# Patient Record
Sex: MALE | Race: ASIAN | Employment: OTHER | URBAN - METROPOLITAN AREA
[De-identification: names, ages, dates, MRNs, and addresses within clinical notes are randomized per-mention and may not be internally consistent; named-entity substitution may affect disease eponyms.]

---

## 2017-10-20 ENCOUNTER — IMMUNIZATION (OUTPATIENT)
Dept: FAMILY MEDICINE CLINIC | Facility: CLINIC | Age: 72
End: 2017-10-20

## 2017-10-20 ENCOUNTER — MED REC SCAN ONLY (OUTPATIENT)
Dept: FAMILY MEDICINE CLINIC | Facility: CLINIC | Age: 72
End: 2017-10-20

## 2017-10-20 PROCEDURE — 90653 IIV ADJUVANT VACCINE IM: CPT | Performed by: NURSE PRACTITIONER

## 2017-10-20 PROCEDURE — G0008 ADMIN INFLUENZA VIRUS VAC: HCPCS | Performed by: NURSE PRACTITIONER

## 2018-09-18 ENCOUNTER — HOSPITAL ENCOUNTER (OUTPATIENT)
Dept: PHYSICAL THERAPY | Facility: HOSPITAL | Age: 73
Setting detail: THERAPIES SERIES
Discharge: HOME | End: 2018-09-18
Attending: Other
Payer: MEDICARE

## 2018-09-18 DIAGNOSIS — R26.9 ABNORMALITY OF GAIT: ICD-10-CM

## 2018-09-18 PROCEDURE — 97110 THERAPEUTIC EXERCISES: CPT

## 2018-09-18 PROCEDURE — 97161 PT EVAL LOW COMPLEX 20 MIN: CPT

## 2018-09-18 NOTE — PROGRESS NOTES
FALL SCREEN EVALUATION   Referring Physician: Dr. Olive Stoll  Diagnosis: abnormality of gait     Date of Service: 9/18/2018     PATIENT Cade Valentine is a 75year old male who presents to therapy today with complaints of balance difficulties and leg into the fall risk category, although he does have gait abnormalities including poor toe clearance which could put him at increased risk of falling.  He does have quick fatigue of the LE's with closed chain activities and exercises contributing to his rocky Index Score: 11/12 Fall Risk: no  [Scores of 10 or less indicate increased risk for falls]  1. Gait level surface: 2  Grading: Arnulfo the lowest category that applies.   (3)  Normal: Walks 20’, no assistive devices, good speed, no evidence of imbalance, cris wall.    4. Gait with vertical head turns: 3  Grading: Arnulfo the lowest category that applies. (3) Normal: Preforms head turns smoothly with no change in gait.   (2) Mild Impairment: Preforms head turns smoothly with slight change in gait velocity, i.e., mi 20-39% impaired, limited, or restricted  Projected G Code: Changing and Maintaining Body Position CI: 1%-19% impaired, limited, or restricted      Patient/Family/Caregiver was advised of these findings, precautions, and treatment options and has agreed to

## 2018-09-20 ENCOUNTER — HOSPITAL ENCOUNTER (OUTPATIENT)
Dept: PHYSICAL THERAPY | Facility: HOSPITAL | Age: 73
Setting detail: THERAPIES SERIES
Discharge: HOME | End: 2018-09-20
Attending: Other
Payer: MEDICARE

## 2018-09-20 PROCEDURE — 97110 THERAPEUTIC EXERCISES: CPT

## 2018-09-20 NOTE — PROGRESS NOTES
Dx: abnormality of gait            Authorized # of Visits:  10 medicare         Next MD visit: none scheduled  Fall Risk: standard         Precautions: n/a             Subjective: Patient has not noticed any new issues with balance this week.  He has been a

## 2018-09-25 ENCOUNTER — HOSPITAL ENCOUNTER (OUTPATIENT)
Dept: PHYSICAL THERAPY | Facility: HOSPITAL | Age: 73
Setting detail: THERAPIES SERIES
Discharge: HOME | End: 2018-09-25
Attending: Other
Payer: MEDICARE

## 2018-09-25 PROCEDURE — 97110 THERAPEUTIC EXERCISES: CPT

## 2018-09-25 NOTE — PROGRESS NOTES
Dx: abnormality of gait            Authorized # of Visits:  10 medicare         Next MD visit: none scheduled  Fall Risk: standard         Precautions: n/a             Subjective: Patient reports that his legs feel stronger when he is going sit to stand.  L 10        Red TB lateral walk, 3 laps Clams, red TB, 15          Charges: Gladys 3( 45 min)   Total Timed Treatment: 45 min     Total Treatment Time: 45 min

## 2018-09-28 ENCOUNTER — HOSPITAL ENCOUNTER (OUTPATIENT)
Dept: PHYSICAL THERAPY | Facility: HOSPITAL | Age: 73
Setting detail: THERAPIES SERIES
Discharge: HOME | End: 2018-09-28
Attending: INTERNAL MEDICINE
Payer: MEDICARE

## 2018-09-28 PROCEDURE — 97110 THERAPEUTIC EXERCISES: CPT

## 2018-09-28 NOTE — PROGRESS NOTES
Dx: abnormality of gait            Authorized # of Visits:  10 medicare         Next MD visit: none scheduled  Fall Risk: standard         Precautions: n/a             Subjective: Patient did not have soreness after previous session.  His legs are feeling s each       LTR, 15 Forward step up, 6\" step, 12 airex stance ball reach, 3'       Active HS stretch, 15 SB roll up, 10  SB 2 leg march, 10 Red TB lateral walk, 3 laps       Red TB lateral walk, 3 laps Clams, red TB, 15          Charges: Gladys 3( 45 min)   T

## 2018-10-02 ENCOUNTER — HOSPITAL ENCOUNTER (OUTPATIENT)
Dept: PHYSICAL THERAPY | Facility: HOSPITAL | Age: 73
Setting detail: THERAPIES SERIES
Discharge: HOME | End: 2018-10-02
Attending: INTERNAL MEDICINE
Payer: MEDICARE

## 2018-10-02 PROCEDURE — 97110 THERAPEUTIC EXERCISES: CPT

## 2018-10-02 NOTE — PROGRESS NOTES
Dx: abnormality of gait            Authorized # of Visits:  10 medicare         Next MD visit: none scheduled  Fall Risk: standard         Precautions: n/a             Subjective: Patient did not have soreness after previous session.  His legs are feeling s 75#, 3x15 Shuttle DLP, 75#, 3x15 Shuttle DLP, 75#, 3x20 Shuttle DLP, 75#, 3x20      Sit<>stand, 2x15 HS stretch on step, 2x30 sec   Shoulder extension, 10#, 15  Extension on SB, 15 SLS, 5 min      SB hip flexion, 20 Calf stretch, 2x30 sec Tiltboard AP, ML,

## 2018-10-09 ENCOUNTER — HOSPITAL ENCOUNTER (OUTPATIENT)
Dept: OCCUPATIONAL MEDICINE | Facility: HOSPITAL | Age: 73
Setting detail: THERAPIES SERIES
Discharge: HOME | End: 2018-10-09
Attending: Other
Payer: MEDICARE

## 2018-10-09 ENCOUNTER — HOSPITAL ENCOUNTER (OUTPATIENT)
Dept: PHYSICAL THERAPY | Facility: HOSPITAL | Age: 73
Setting detail: THERAPIES SERIES
Discharge: HOME | End: 2018-10-09
Attending: INTERNAL MEDICINE
Payer: MEDICARE

## 2018-10-09 DIAGNOSIS — R25.1 TREMOR OF BOTH HANDS: ICD-10-CM

## 2018-10-09 PROCEDURE — 97165 OT EVAL LOW COMPLEX 30 MIN: CPT

## 2018-10-09 PROCEDURE — 97110 THERAPEUTIC EXERCISES: CPT

## 2018-10-09 NOTE — PROGRESS NOTES
OCCUPATIONAL THERAPY UPPER EXTREMITY EVALUATION   Referring Physician: Dr. Precious Razo  Diagnosis: bilateral hand tremors  (R25.1)   Date of Service: 10/9/2018     PATIENT SUMMARY   Erlin Sosa is a 75year old y/o male who presents to therapy today with com Along bilateral Uln distribution FA    EDEMA:  None bilateral hands    ROM: WNL ZEKE UE     AROM/PROM:(Degrees)  BUE FISHER =WNL    MANUAL MUSCLE TESTING: 3-/5  Of 5/5  Bilateral wrists    Strength (lbs) Right Average Left Average   : 65# 66#   2 pt Pinch: ISA Arceo    [de-identified] certification required: Yes  I certify the need for these services furnished under this plan of treatment and while under my care.     X___________________________________________________ Date____________________    Kathyrn Boxer

## 2018-10-09 NOTE — PROGRESS NOTES
Dx: abnormality of gait            Authorized # of Visits:  10 medicare         Next MD visit: none scheduled  Fall Risk: standard         Precautions: n/a             Subjective: Patient reports that he feels sluggish today and doesn't think that he slept stretch, 3x30 sec     Shuttle DLP, 75#, 3x15 Shuttle DLP, 75#, 3x15 Shuttle DLP, 75#, 3x20 Shuttle DLP, 75#, 3x20 SB roll up, 20     Sit<>stand, 2x15 HS stretch on step, 2x30 sec   Shoulder extension, 10#, 15  Extension on SB, 15 SLS, 5 min Active HS stret

## 2018-10-12 ENCOUNTER — APPOINTMENT (OUTPATIENT)
Dept: PHYSICAL THERAPY | Facility: HOSPITAL | Age: 73
End: 2018-10-12
Attending: INTERNAL MEDICINE
Payer: MEDICARE

## 2018-10-15 ENCOUNTER — HOSPITAL ENCOUNTER (OUTPATIENT)
Dept: OCCUPATIONAL MEDICINE | Facility: HOSPITAL | Age: 73
Setting detail: THERAPIES SERIES
Discharge: HOME | End: 2018-10-15
Attending: INTERNAL MEDICINE
Payer: MEDICARE

## 2018-10-15 PROCEDURE — 97112 NEUROMUSCULAR REEDUCATION: CPT

## 2018-10-15 PROCEDURE — 97140 MANUAL THERAPY 1/> REGIONS: CPT

## 2018-10-15 NOTE — PROGRESS NOTES
Dx:  Tremor of both hands (R25.1)        Authorized # of Visits:  2/12         Next MD visit: none scheduled  Fall Risk: standard         Precautions: n/a             Subjective:   Pt reports:  Most difficulties are with directed , specific tip pinch tasks

## 2018-10-22 ENCOUNTER — HOSPITAL ENCOUNTER (OUTPATIENT)
Dept: PHYSICAL THERAPY | Facility: HOSPITAL | Age: 73
Setting detail: THERAPIES SERIES
Discharge: HOME | End: 2018-10-22
Attending: INTERNAL MEDICINE
Payer: MEDICARE

## 2018-10-22 PROCEDURE — 97110 THERAPEUTIC EXERCISES: CPT

## 2018-10-23 ENCOUNTER — HOSPITAL ENCOUNTER (OUTPATIENT)
Dept: OCCUPATIONAL MEDICINE | Facility: HOSPITAL | Age: 73
Setting detail: THERAPIES SERIES
Discharge: HOME | End: 2018-10-23
Attending: Other
Payer: MEDICARE

## 2018-10-23 PROCEDURE — 97110 THERAPEUTIC EXERCISES: CPT

## 2018-10-23 NOTE — PROGRESS NOTES
Dx:  Tremor of both hands (R25.1)        Authorized # of Visits:  2/12         Next MD visit: none scheduled  Fall Risk: standard         Precautions: n/a             Subjective:   Pt reports:  * has a hard time positioning finger for DB blood test strip. Skilled Services:manual therapy    Charges: 2xTE, 1x MT       Total Timed Treatment: 45 min  Total Treatment Time: 50 min

## 2018-10-23 NOTE — PROGRESS NOTES
FALL SCREEN PROGRESS:   Referring Physician: Dr. Rhea Boucher  Diagnosis: abnormality of gait     Date of Service: 10/22/2018     PATIENT Kenneth Curry is a 75year old male who has attended 7 session of PT for chief complaint of balance difficulties a Wan 2006. ..   61-75 y/o mean 8.1s (7.1-9.0s)   66-78 y/o mean 9.2s (8.2-10.2s)   80-81 y/o mean 11.3s (10.0-12.7s)]    4 Item Dynamic Gait Index Score: 11/12 Fall Risk: no  [Scores of 10 or less indicate increased risk for falls]  1.  Gait level surfa Severe Impairment: Preform task with severe disruption of gait, i.e., staggers outside 15” path, loses balance, stops, reaches for wall. 4. Gait with vertical head turns: 3  Grading: Arnulfo the lowest category that applies.   (3) Normal: Preforms head tur Re-education; Gait Training; Therapeutic Exercises; Manual Therapy;  Patient education; Home exercise program instruction    Education or treatment limitation: None  Rehab Potential:good    FOTO: 61/100    Current G Code: Changing and Maintaining Body Posit

## 2018-10-30 ENCOUNTER — HOSPITAL ENCOUNTER (OUTPATIENT)
Dept: OCCUPATIONAL MEDICINE | Facility: HOSPITAL | Age: 73
Setting detail: THERAPIES SERIES
Discharge: HOME | End: 2018-10-30
Attending: INTERNAL MEDICINE
Payer: MEDICARE

## 2018-10-30 PROCEDURE — 97110 THERAPEUTIC EXERCISES: CPT

## 2018-10-30 PROCEDURE — 97112 NEUROMUSCULAR REEDUCATION: CPT

## 2018-10-30 NOTE — PROGRESS NOTES
Dx:  Tremor of both hands (R25.1)        Authorized # of Visits:  4/12         Next MD visit: none scheduled  Fall Risk: standard         Precautions: n/a             Subjective:   Pt reports:  * \"pins/needles\" seem to be increasing weekly in bilateral S and carpals yel putty tip pinch,pull and  bilat       yel putty  and pinch bilat hands Nut/bolt board on/off tip  Pinch bilat yel putty grab,squeeze twist       9-hole peg test in and tweezer out  bilat Thin silver peg in/out tip all bilateral Red

## 2018-11-06 ENCOUNTER — APPOINTMENT (OUTPATIENT)
Dept: OCCUPATIONAL MEDICINE | Facility: HOSPITAL | Age: 73
End: 2018-11-06
Payer: MEDICARE

## 2018-11-07 ENCOUNTER — APPOINTMENT (OUTPATIENT)
Dept: OCCUPATIONAL MEDICINE | Facility: HOSPITAL | Age: 73
End: 2018-11-07
Attending: INTERNAL MEDICINE
Payer: MEDICARE

## 2018-11-09 ENCOUNTER — APPOINTMENT (OUTPATIENT)
Dept: OCCUPATIONAL MEDICINE | Facility: HOSPITAL | Age: 73
End: 2018-11-09
Attending: INTERNAL MEDICINE
Payer: MEDICARE

## 2018-11-12 ENCOUNTER — HOSPITAL ENCOUNTER (OUTPATIENT)
Dept: PHYSICAL THERAPY | Facility: HOSPITAL | Age: 73
Setting detail: THERAPIES SERIES
Discharge: HOME | End: 2018-11-12
Attending: INTERNAL MEDICINE
Payer: MEDICARE

## 2018-11-12 PROCEDURE — 97110 THERAPEUTIC EXERCISES: CPT

## 2018-11-12 NOTE — PROGRESS NOTES
Dx: abnormality of gait            Authorized # of Visits:  10 medicare         Next MD visit: none scheduled  Fall Risk: standard         Precautions: n/a             Subjective: Patient reports that his back has been bothering him but that he has been ab 3x30 sec Supine quad stretch, 3x30 sec    airex step up and over, 10 airex step up and over, 10 Cone taps, 2x5 Scott/airex walk over, 4 laps Supine HS stretch, 3x30 sec Supine HS stretch, 3x30 sec    Standing march, 2x1' Standing march, 2x1' March w/ opp

## 2018-11-13 ENCOUNTER — HOSPITAL ENCOUNTER (OUTPATIENT)
Dept: OCCUPATIONAL MEDICINE | Facility: HOSPITAL | Age: 73
Setting detail: THERAPIES SERIES
Discharge: HOME | End: 2018-11-13
Attending: Other
Payer: MEDICARE

## 2018-11-13 PROCEDURE — 97110 THERAPEUTIC EXERCISES: CPT

## 2018-11-13 NOTE — PROGRESS NOTES
Dx:  Tremor of both hands (R25.1)        Authorized # of Visits:  5/12         Next MD visit:    Fall Risk: standard         Precautions: n/a             Subjective:   Pt reports:  * \"pins/needles\" continues to be increasing weekly in bilateral SF , ulna and  bilat yel puttycize bilat 2 directions      yel putty  and pinch bilat hands Nut/bolt board on/off tip  Pinch bilat yel putty grab,squeeze twist Red twist stik 3 directions        9-hole peg test in and tweezer out  bilat Thin silver peg in/ou

## 2018-11-20 ENCOUNTER — APPOINTMENT (OUTPATIENT)
Dept: OCCUPATIONAL MEDICINE | Facility: HOSPITAL | Age: 73
End: 2018-11-20
Payer: MEDICARE

## 2018-11-27 ENCOUNTER — ORDER TRANSCRIPTION (OUTPATIENT)
Dept: PHYSICAL THERAPY | Facility: HOSPITAL | Age: 73
End: 2018-11-27

## 2018-11-27 ENCOUNTER — HOSPITAL ENCOUNTER (OUTPATIENT)
Dept: OCCUPATIONAL MEDICINE | Facility: HOSPITAL | Age: 73
Setting detail: THERAPIES SERIES
Discharge: HOME | End: 2018-11-27
Attending: INTERNAL MEDICINE
Payer: MEDICARE

## 2018-11-27 DIAGNOSIS — M54.12 CERVICAL RADICULITIS: Primary | ICD-10-CM

## 2018-11-27 PROCEDURE — 97112 NEUROMUSCULAR REEDUCATION: CPT

## 2018-11-27 PROCEDURE — 97110 THERAPEUTIC EXERCISES: CPT

## 2018-11-27 NOTE — PROGRESS NOTES
Dx:  Tremor of both hands (R25.1)        Authorized # of Visits:  6/12         Next MD visit:    Fall Risk: standard         Precautions: n/a           Discharge Summary    Pt has attended 6, cancelled 1, and no shown 0 visits in Occupational Therapy. pt Pinch: 15# 14#  (-1)   Lateral Pinch: 12#  (-2) 12#  (-1)      NECK SCREEN:  WNL Tho resting neck position: head listing to R. Pt is aware of this position.  Can correct to normal midline if asked.     9- Hole Peg Test: R=26.62 sec  (+2 sec) and rad dev. jnt mobs bilat fingers and carpals yel putty tip pinch,pull and  bilat yel puttycize bilat 2 directions yel puttycize bilat 2 directions     yel putty  and pinch bilat hands Nut/bolt board on/off tip  Pinch bilat yel putty grab,squeeze

## 2018-12-04 ENCOUNTER — HOSPITAL ENCOUNTER (OUTPATIENT)
Dept: PHYSICAL THERAPY | Facility: HOSPITAL | Age: 73
Setting detail: THERAPIES SERIES
Discharge: HOME | End: 2018-12-04
Attending: INTERNAL MEDICINE
Payer: MEDICARE

## 2018-12-04 DIAGNOSIS — M54.12 CERVICAL RADICULITIS: ICD-10-CM

## 2018-12-04 PROCEDURE — 97110 THERAPEUTIC EXERCISES: CPT | Performed by: PHYSICAL THERAPIST

## 2018-12-04 PROCEDURE — 97162 PT EVAL MOD COMPLEX 30 MIN: CPT | Performed by: PHYSICAL THERAPIST

## 2018-12-04 NOTE — PROGRESS NOTES
SPINE EVALUATION:   Referring Physician: Dr. Star De La Torre  Diagnosis: Cervical radiculitis (M54.12)     Date of Service: 12/4/2018     PATIENT SUMMARY   Gregg Delgado is a 75year old y/o male who presents to therapy today with complaints of history of back, n mechanical and structural components, He also has significantly tightness in his hamstring.s  Physical Exam findings that reproduce the patient's chief complaint include forward bending, transition form sit-stand.    Physical Exam findings that improve the dural flossing.     Patient was instructed in and issued a HEP for general stretch, flexibility   Charges: PT Eval Moderate Complexity, TE1      Total Timed Treatment: 45 min     Total Treatment Time: 45 min     PLAN OF CARE:    Goals:    · Pt will improve this course of care. Thank you for your referral. Please co-sign or sign and return this letter via fax as soon as possible to [phone].  If you have any questions, please contact me at Dept: [phone]    Sincerely,  Electronically signed by thera

## 2018-12-07 ENCOUNTER — HOSPITAL ENCOUNTER (OUTPATIENT)
Dept: PHYSICAL THERAPY | Facility: HOSPITAL | Age: 73
Setting detail: THERAPIES SERIES
Discharge: HOME | End: 2018-12-07
Attending: INTERNAL MEDICINE
Payer: MEDICARE

## 2018-12-07 NOTE — PROGRESS NOTES
Dx: Cervical radiculitis (M54.12)   Authorized # of Visits:  8         Next MD visit: none scheduled  Fall Risk: standard         Precautions: n/a             Subjective: No change in my pain. Started my exercise. Objective: Seen for first treatment.

## 2018-12-10 ENCOUNTER — HOSPITAL ENCOUNTER (OUTPATIENT)
Dept: PHYSICAL THERAPY | Facility: HOSPITAL | Age: 73
Setting detail: THERAPIES SERIES
Discharge: HOME | End: 2018-12-10
Attending: INTERNAL MEDICINE
Payer: MEDICARE

## 2018-12-10 PROCEDURE — 97110 THERAPEUTIC EXERCISES: CPT | Performed by: PHYSICAL THERAPIST

## 2018-12-10 PROCEDURE — 97140 MANUAL THERAPY 1/> REGIONS: CPT | Performed by: PHYSICAL THERAPIST

## 2018-12-13 ENCOUNTER — HOSPITAL ENCOUNTER (OUTPATIENT)
Dept: PHYSICAL THERAPY | Facility: HOSPITAL | Age: 73
Setting detail: THERAPIES SERIES
Discharge: HOME | End: 2018-12-13
Attending: INTERNAL MEDICINE
Payer: MEDICARE

## 2018-12-13 PROCEDURE — 97110 THERAPEUTIC EXERCISES: CPT | Performed by: PHYSICAL THERAPIST

## 2018-12-13 PROCEDURE — 97140 MANUAL THERAPY 1/> REGIONS: CPT | Performed by: PHYSICAL THERAPIST

## 2018-12-14 NOTE — PROGRESS NOTES
Dx: Cervical radiculitis (M54.12)   Authorized # of Visits:  8         Next MD visit: none scheduled  Fall Risk: standard         Precautions: n/a             Subjective: Pain maybe a little bit better. Still have a hard time with getting dressed .   Legs a

## 2018-12-17 ENCOUNTER — APPOINTMENT (OUTPATIENT)
Dept: PHYSICAL THERAPY | Facility: HOSPITAL | Age: 73
End: 2018-12-17
Attending: INTERNAL MEDICINE
Payer: MEDICARE

## 2018-12-18 ENCOUNTER — HOSPITAL ENCOUNTER (OUTPATIENT)
Dept: PHYSICAL THERAPY | Facility: HOSPITAL | Age: 73
Setting detail: THERAPIES SERIES
Discharge: HOME | End: 2018-12-18
Attending: INTERNAL MEDICINE
Payer: MEDICARE

## 2018-12-18 PROCEDURE — 97110 THERAPEUTIC EXERCISES: CPT | Performed by: PHYSICAL THERAPIST

## 2018-12-18 PROCEDURE — 97140 MANUAL THERAPY 1/> REGIONS: CPT | Performed by: PHYSICAL THERAPIST

## 2018-12-18 NOTE — PROGRESS NOTES
Dx: Cervical radiculitis (M54.12)   Authorized # of Visits:  8         Next MD visit: none scheduled  Fall Risk: standard         Precautions: n/a             Subjective:Still tight, tender when I have to stand for extended periods.   Tried to go shopping t

## 2018-12-20 ENCOUNTER — HOSPITAL ENCOUNTER (OUTPATIENT)
Dept: PHYSICAL THERAPY | Facility: HOSPITAL | Age: 73
Setting detail: THERAPIES SERIES
Discharge: HOME | End: 2018-12-20
Attending: INTERNAL MEDICINE
Payer: MEDICARE

## 2018-12-20 PROCEDURE — 97110 THERAPEUTIC EXERCISES: CPT | Performed by: PHYSICAL THERAPIST

## 2018-12-20 PROCEDURE — 97140 MANUAL THERAPY 1/> REGIONS: CPT | Performed by: PHYSICAL THERAPIST

## 2018-12-20 NOTE — PROGRESS NOTES
Dx: Cervical radiculitis (M54.12)   Authorized # of Visits:  8         Next MD visit: none scheduled  Fall Risk: standard         Precautions: n/a             Subjective: Pain is better when I am standing but it still hurts when I stand longer than 10' (us spine in sidelying .   Green knobby ball stm to lumbar spine in sidelying  Balance board x2' each direction       DKTC x20   LBR x20  Standing LBR with red TB x10 with soft knees                Skilled Services: TNE    Charges: 2 te, 1 manual        Total T

## 2018-12-24 ENCOUNTER — HOSPITAL ENCOUNTER (OUTPATIENT)
Dept: PHYSICAL THERAPY | Facility: HOSPITAL | Age: 73
Setting detail: THERAPIES SERIES
Discharge: HOME | End: 2018-12-24
Attending: INTERNAL MEDICINE
Payer: MEDICARE

## 2018-12-24 PROCEDURE — 97140 MANUAL THERAPY 1/> REGIONS: CPT | Performed by: PHYSICAL THERAPIST

## 2018-12-24 PROCEDURE — 97110 THERAPEUTIC EXERCISES: CPT | Performed by: PHYSICAL THERAPIST

## 2018-12-24 NOTE — PROGRESS NOTES
Dx: Cervical radiculitis (M54.12)   Authorized # of Visits:  8         Next MD visit: none scheduled  Fall Risk: standard         Precautions: n/a             Subjective: Still have pain when I am standing in extended periods .       Objective: Seen for alberto rotation with red tb x10 each seated  Green knobby ball stm to lumbar spine in sidelying .   Green knobby ball stm to lumbar spine in sidelying  Balance board x2' each direction  Standing LBR with red TB x10 with soft knees     DKTC x20   LBR x20  Standing

## 2018-12-31 ENCOUNTER — HOSPITAL ENCOUNTER (OUTPATIENT)
Dept: PHYSICAL THERAPY | Facility: HOSPITAL | Age: 73
Setting detail: THERAPIES SERIES
Discharge: HOME | End: 2018-12-31
Attending: INTERNAL MEDICINE
Payer: MEDICARE

## 2018-12-31 PROCEDURE — 97140 MANUAL THERAPY 1/> REGIONS: CPT | Performed by: PHYSICAL THERAPIST

## 2018-12-31 PROCEDURE — 97110 THERAPEUTIC EXERCISES: CPT | Performed by: PHYSICAL THERAPIST

## 2018-12-31 NOTE — PROGRESS NOTES
Dx: Cervical radiculitis (M54.12)   Authorized # of Visits:  8         Next MD visit: none scheduled  Fall Risk: standard         Precautions: n/a             Subjective: About 60% better overall.  Still notes pain and fatigue when I am standing and shaving SB   DKTC  LBR  Opp arm/leg  piriformis stretch supine x20 SB   DKTC  LBR  Opp arm/leg  piriformis stretch supine x20 SB   DKTC  LBR  Opp arm/leg  piriformis stretch supine x20   Rhythmic stab in hooklying  Seated pelvic tilts Pelvic tilts/rocks in sittin

## 2019-01-04 ENCOUNTER — HOSPITAL ENCOUNTER (OUTPATIENT)
Dept: PHYSICAL THERAPY | Facility: HOSPITAL | Age: 74
Setting detail: THERAPIES SERIES
Discharge: HOME | End: 2019-01-04
Attending: INTERNAL MEDICINE
Payer: MEDICARE

## 2019-01-04 PROCEDURE — 97140 MANUAL THERAPY 1/> REGIONS: CPT | Performed by: PHYSICAL THERAPIST

## 2019-01-04 PROCEDURE — 97110 THERAPEUTIC EXERCISES: CPT | Performed by: PHYSICAL THERAPIST

## 2019-01-04 NOTE — PROGRESS NOTES
Dx: Cervical radiculitis (M54.12)   Authorized # of Visits:  8         Next MD visit: none scheduled  Fall Risk: standard         Precautions: n/a             Subjective: Have a cold, so everything aches. Back is sore and tired today.  Haven't been doing t x20 SB   DKTC  LBR  Opp arm/leg  piriformis stretch supine x20 SB   DKTC  LBR  Opp arm/leg  piriformis stretch supine x20   Rhythmic stab in hooklying  Seated pelvic tilts Pelvic tilts/rocks in sitting x10  Pelvic tilts/rocks in sitting x10 Standing LBR wi

## 2019-01-07 ENCOUNTER — HOSPITAL ENCOUNTER (OUTPATIENT)
Dept: PHYSICAL THERAPY | Facility: HOSPITAL | Age: 74
Setting detail: THERAPIES SERIES
Discharge: HOME | End: 2019-01-07
Attending: INTERNAL MEDICINE
Payer: MEDICARE

## 2019-01-07 PROCEDURE — 97110 THERAPEUTIC EXERCISES: CPT | Performed by: PHYSICAL THERAPIST

## 2019-01-07 PROCEDURE — 97140 MANUAL THERAPY 1/> REGIONS: CPT | Performed by: PHYSICAL THERAPIST

## 2019-01-07 NOTE — PROGRESS NOTES
Dx: Cervical radiculitis (M54.12)   Authorized # of Visits:  8         Next MD visit: none scheduled  Fall Risk: standard         Precautions: n/a             Subjective: Standing still bothers me babak when I have to shave first thing in the morning .   Have with band x20 each  Red TB Bent knee fall outs   Bridge with band x20 each  gnr  TB   Hs stretch /lad for hips/knee    Hs stretch /lad for hips/knee  SB   DKTC  LBR  Opp arm/leg  piriformis stretch supine x20  SB   DKTC  LBR  Opp arm/leg  piriformis stretc

## 2019-01-10 ENCOUNTER — APPOINTMENT (OUTPATIENT)
Dept: PHYSICAL THERAPY | Facility: HOSPITAL | Age: 74
End: 2019-01-10
Payer: MEDICARE

## 2019-01-11 ENCOUNTER — OFFICE VISIT (OUTPATIENT)
Dept: FAMILY MEDICINE CLINIC | Facility: CLINIC | Age: 74
End: 2019-01-11
Payer: COMMERCIAL

## 2019-01-11 VITALS
TEMPERATURE: 99 F | HEIGHT: 66 IN | RESPIRATION RATE: 14 BRPM | DIASTOLIC BLOOD PRESSURE: 60 MMHG | SYSTOLIC BLOOD PRESSURE: 140 MMHG | BODY MASS INDEX: 33.59 KG/M2 | OXYGEN SATURATION: 99 % | HEART RATE: 58 BPM | WEIGHT: 209 LBS

## 2019-01-11 DIAGNOSIS — R05.8 PRODUCTIVE COUGH: Primary | ICD-10-CM

## 2019-01-11 PROCEDURE — 99203 OFFICE O/P NEW LOW 30 MIN: CPT | Performed by: FAMILY MEDICINE

## 2019-01-11 RX ORDER — OLMESARTAN MEDOXOMIL 40 MG/1
40 TABLET ORAL DAILY
COMMUNITY

## 2019-01-11 RX ORDER — PREGABALIN 75 MG/1
75 CAPSULE ORAL
COMMUNITY
End: 2020-01-07

## 2019-01-11 RX ORDER — LEVOTHYROXINE SODIUM 75 UG/1
CAPSULE ORAL
COMMUNITY
End: 2019-10-11

## 2019-01-11 RX ORDER — POTASSIUM CHLORIDE 750 MG/1
10 TABLET, EXTENDED RELEASE ORAL
COMMUNITY
End: 2020-03-30

## 2019-01-11 RX ORDER — TAMSULOSIN HYDROCHLORIDE 0.4 MG/1
0.8 CAPSULE ORAL
COMMUNITY
End: 2019-10-03

## 2019-01-11 RX ORDER — AMOXICILLIN AND CLAVULANATE POTASSIUM 500; 125 MG/1; MG/1
1 TABLET, FILM COATED ORAL 2 TIMES DAILY
Qty: 14 TABLET | Refills: 0 | Status: SHIPPED | OUTPATIENT
Start: 2019-01-11 | End: 2019-07-22 | Stop reason: ALTCHOICE

## 2019-01-12 NOTE — PROGRESS NOTES
HPI:   Jovan Ferrara is a 75year old male that presents for Patient presents with:  Establish Care: new to doctor  Cough: approx. 3-4 days; pt denies fever    Patient denies any fever or chills. Slightly productive cough.   Patient was previously seeing  MetFORMIN HCl (GLUCOPHAGE) 500 MG Oral Tab, , Disp: , Rfl:   •  Niacin ER, Antihyperlipidemic, (NIASPAN) 500 MG Oral Tab CR, , Disp: , Rfl:   •  simvastatin (ZOCOR) 10 MG Oral Tab, , Disp: , Rfl:   •  ACCU-CHEK COMPACT PLUS In Vitro Strip, , Disp: , Rfl:

## 2019-01-14 ENCOUNTER — HOSPITAL ENCOUNTER (OUTPATIENT)
Dept: PHYSICAL THERAPY | Facility: HOSPITAL | Age: 74
Setting detail: THERAPIES SERIES
Discharge: HOME | End: 2019-01-14
Attending: FAMILY MEDICINE
Payer: MEDICARE

## 2019-01-14 PROCEDURE — 97140 MANUAL THERAPY 1/> REGIONS: CPT | Performed by: PHYSICAL THERAPIST

## 2019-01-14 PROCEDURE — 97110 THERAPEUTIC EXERCISES: CPT | Performed by: PHYSICAL THERAPIST

## 2019-01-14 NOTE — PROGRESS NOTES
Dx: Cervical radiculitis (M54.12)   Authorized # of Visits:  8         Next MD visit: none scheduled  Fall Risk: standard         Precautions: n/a             Subjective: better today. Overall I can tell that most days it doesn't hurt like it used to .   I Bridge with band x20 each  gnr  TB Bent knee fall outs   Bridge with band x20 each  gnr  TB   Hs stretch /lad for hips/knee    Hs stretch /lad for hips/knee  SB   DKTC  LBR  Opp arm/leg  piriformis stretch supine x20  SB   DKTC  LBR  Opp arm/leg  pirifor Treatment Time: 45 min

## 2019-01-17 ENCOUNTER — HOSPITAL ENCOUNTER (OUTPATIENT)
Dept: PHYSICAL THERAPY | Facility: HOSPITAL | Age: 74
Setting detail: THERAPIES SERIES
Discharge: HOME | End: 2019-01-17
Attending: INTERNAL MEDICINE
Payer: MEDICARE

## 2019-01-17 PROCEDURE — 97140 MANUAL THERAPY 1/> REGIONS: CPT | Performed by: PHYSICAL THERAPIST

## 2019-01-17 PROCEDURE — 97110 THERAPEUTIC EXERCISES: CPT | Performed by: PHYSICAL THERAPIST

## 2019-01-18 NOTE — PROGRESS NOTES
Dx: Cervical radiculitis (M54.12)   Authorized # of Visits:  8         Next MD visit: none scheduled  Fall Risk: standard         Precautions: n/a             Subjective: Stiff today, so my upper back and middle back feel very tight.   Standing was hard tod Bent knee fall outs   Bridge with band x20 each  Red TB  Bent knee fall outs   Bridge with band x20 each  Red TB  Bent knee fall outs   Bridge with band x20 each  Red TB Bent knee fall outs   Bridge with band x20 each  gnr  TB Bent knee fall outs   Bridge red TB x10 with soft knees Standing LBR with red TB x10 Standing LBR with red TB x10 sidelying with lumbar flexion green knobby roller x5' to lumbar paraspinals   sidelying with lumbar flexion green knobby roller x5' to lumbar paraspinals     DKTC x20   LB

## 2019-03-04 ENCOUNTER — PRIOR ORIGINAL RECORDS (OUTPATIENT)
Dept: OTHER | Age: 74
End: 2019-03-04

## 2019-03-07 VITALS
WEIGHT: 209 LBS | SYSTOLIC BLOOD PRESSURE: 132 MMHG | HEART RATE: 64 BPM | DIASTOLIC BLOOD PRESSURE: 68 MMHG | HEIGHT: 66 IN | BODY MASS INDEX: 33.59 KG/M2

## 2019-03-08 DIAGNOSIS — Z95.1 HX OF CABG: Primary | ICD-10-CM

## 2019-03-08 DIAGNOSIS — Z95.1 HX OF CABG: ICD-10-CM

## 2019-03-08 DIAGNOSIS — I25.10 CARDIOVASCULAR ARTERIOSCLEROSIS: Primary | ICD-10-CM

## 2019-03-08 DIAGNOSIS — I25.10 CORONARY ARTERIOSCLEROSIS: ICD-10-CM

## 2019-03-19 ENCOUNTER — HOSPITAL ENCOUNTER (OUTPATIENT)
Dept: CV DIAGNOSTICS | Facility: HOSPITAL | Age: 74
Discharge: HOME | End: 2019-03-19
Attending: INTERNAL MEDICINE

## 2019-03-19 DIAGNOSIS — Z95.1 HX OF CABG: ICD-10-CM

## 2019-03-19 DIAGNOSIS — I25.10 CORONARY ARTERIOSCLEROSIS: ICD-10-CM

## 2019-03-19 PROCEDURE — 93306 TTE W/DOPPLER COMPLETE: CPT | Performed by: INTERNAL MEDICINE

## 2019-03-22 ENCOUNTER — TELEPHONE (OUTPATIENT)
Dept: CARDIOLOGY | Age: 74
End: 2019-03-22

## 2019-04-01 ENCOUNTER — MED REC SCAN ONLY (OUTPATIENT)
Dept: FAMILY MEDICINE CLINIC | Facility: CLINIC | Age: 74
End: 2019-04-01

## 2019-04-05 ENCOUNTER — OFFICE VISIT (OUTPATIENT)
Dept: FAMILY MEDICINE CLINIC | Facility: CLINIC | Age: 74
End: 2019-04-05
Payer: COMMERCIAL

## 2019-04-05 VITALS
DIASTOLIC BLOOD PRESSURE: 60 MMHG | WEIGHT: 208 LBS | SYSTOLIC BLOOD PRESSURE: 124 MMHG | RESPIRATION RATE: 12 BRPM | HEART RATE: 53 BPM | TEMPERATURE: 98 F | BODY MASS INDEX: 33.43 KG/M2 | OXYGEN SATURATION: 100 % | HEIGHT: 66 IN

## 2019-04-05 DIAGNOSIS — B02.9 HERPES ZOSTER WITHOUT COMPLICATION: Primary | ICD-10-CM

## 2019-04-05 DIAGNOSIS — M25.511 ACUTE PAIN OF RIGHT SHOULDER: ICD-10-CM

## 2019-04-05 DIAGNOSIS — R05.9 COUGH: ICD-10-CM

## 2019-04-05 PROCEDURE — 99214 OFFICE O/P EST MOD 30 MIN: CPT | Performed by: FAMILY MEDICINE

## 2019-04-05 RX ORDER — CEPHALEXIN 500 MG/1
500 CAPSULE ORAL 3 TIMES DAILY
COMMUNITY
End: 2019-07-22 | Stop reason: ALTCHOICE

## 2019-04-05 RX ORDER — VALACYCLOVIR HYDROCHLORIDE 1 G/1
TABLET, FILM COATED ORAL 3 TIMES DAILY
COMMUNITY
End: 2019-07-22 | Stop reason: ALTCHOICE

## 2019-04-05 NOTE — PROGRESS NOTES
HPI:   Kristen Burleson is a 75year old male that presents for shingles    Patient is here for a follow up on shingles. He was in [facility] and got prescribed valacyclovir and keflex.  He is currently taking meds    Shoulder pain- right side; limited ROM for the last ACCU-CHEK COMPACT PLUS In Vitro Strip, , Disp: , Rfl:   •  LANTUS SOLOSTAR 100 UNIT/ML Subcutaneous Solution Pen-injector, , Disp: , Rfl:   •  APIDRA SOLOSTAR 100 UNIT/ML Subcutaneous Solution Pen-injector, , Disp: , Rfl:   •  Isosorbide Mononitrate ER (IM shoulder    Cough likely due to upper respiratory infection. Symptomatic treatment with decongestant and OTC cold medicine if needed. If worsens seek medical attention.     Regarding shingles rash on the posterior right arm continue and finish course of V

## 2019-04-07 LAB
ALT SERPL-CCNC: 22 U/L (ref 9–46)
AST SERPL-CCNC: 19 U/L (ref 10–35)
CHOLEST SERPL-MCNC: 144 MG/DL
CHOLEST/HDLC SERPL: 4.1 (CALC)
HDLC SERPL-MCNC: 35 MG/DL
LDLC SERPL CALC-MCNC: 82 MG/DL (CALC)
NONHDLC SERPL-MCNC: 109 MG/DL (CALC)
TRIGL SERPL-MCNC: 173 MG/DL

## 2019-04-08 ENCOUNTER — TELEPHONE (OUTPATIENT)
Dept: FAMILY MEDICINE CLINIC | Facility: CLINIC | Age: 74
End: 2019-04-08

## 2019-04-08 NOTE — TELEPHONE ENCOUNTER
Please advise on alternatives    LOV: 4/5/19  Est Care 01/11/19   Medication Quantity Refills Start End   Olmesartan Medoxomil 40 MG Oral Tab       Sig:   Take 40 mg by mouth.

## 2019-04-08 NOTE — TELEPHONE ENCOUNTER
Pt calling to let Dr. Jose Alberto Fletcher know that he is unable to get a refill on the Olmesartan. Pharmacies are out of the medication-it is on backorder. Patient will need alternate medication sent to local [address] [place] [address].   Dr. Jose Alberto Fletcher has not prescribed this [facility]

## 2019-04-09 ENCOUNTER — TELEPHONE (OUTPATIENT)
Dept: CARDIOLOGY | Age: 74
End: 2019-04-09

## 2019-04-09 RX ORDER — IRBESARTAN 150 MG/1
150 TABLET ORAL DAILY
Qty: 90 TABLET | Refills: 0 | Status: SHIPPED | OUTPATIENT
Start: 2019-04-09 | End: 2019-07-21

## 2019-04-09 NOTE — TELEPHONE ENCOUNTER
Can take irbesartan 150mg daily, sent to pharmacy. Patient should have blood pressure check in 1 week. If still high we can always increase to 300 mg if we need to. Usual dosages either 150 or 300 mg.     Please note cannot equate milligrams of one medic

## 2019-04-09 NOTE — TELEPHONE ENCOUNTER
Patient informed of MD recommendations, patient verbalized understanding with intent to comply. Offered opportunity to ask questions, all questions were answered.     Future Appointments   Date Time Provider Agatha Hogan   4/16/2019  2:40 PM Nancy Briggs

## 2019-04-16 ENCOUNTER — OFFICE VISIT (OUTPATIENT)
Dept: FAMILY MEDICINE CLINIC | Facility: CLINIC | Age: 74
End: 2019-04-16
Payer: COMMERCIAL

## 2019-04-16 VITALS
OXYGEN SATURATION: 99 % | BODY MASS INDEX: 33.91 KG/M2 | WEIGHT: 211 LBS | TEMPERATURE: 98 F | SYSTOLIC BLOOD PRESSURE: 118 MMHG | HEIGHT: 66 IN | RESPIRATION RATE: 16 BRPM | HEART RATE: 50 BPM | DIASTOLIC BLOOD PRESSURE: 60 MMHG

## 2019-04-16 DIAGNOSIS — B02.9 HERPES ZOSTER WITHOUT COMPLICATION: ICD-10-CM

## 2019-04-16 DIAGNOSIS — I10 ESSENTIAL HYPERTENSION: ICD-10-CM

## 2019-04-16 DIAGNOSIS — M54.5 BILATERAL LOW BACK PAIN, UNSPECIFIED CHRONICITY, WITH SCIATICA PRESENCE UNSPECIFIED: Primary | ICD-10-CM

## 2019-04-16 PROCEDURE — 99214 OFFICE O/P EST MOD 30 MIN: CPT | Performed by: FAMILY MEDICINE

## 2019-04-16 NOTE — PROGRESS NOTES
HPI:   Mirza Young is a 75year old male that presents for Patient presents with:  Medication Follow-Up: Irbesartan- no complaints or concerns, he states medication seems to be working fine- he hasn't checked at home.     Patient states that having some ch Tab, , Disp: , Rfl:   •  Vitamin D, Ergocalciferol, (DRISDOL) 30165 UNITS Oral Cap, , Disp: , Rfl:   •  Ferrous Sulfate 325 (65 FE) MG Oral Tab, , Disp: , Rfl:   •  ACCU-CHEK COMPACT PLUS In Vitro Strip, , Disp: , Rfl:   •  LANTUS SOLOSTAR 100 UNIT/ML Subc cyanosis, 2+ radial pulses b/l. NEURO:  Grossly normal     ASSESSMENT AND PLAN:      1. Bilateral low back pain, unspecified chronicity, with sciatica presence unspecified  - PHYSICAL THERAPY EXTERNAL    2. Essential hypertension    3.  Herpes zoster with

## 2019-04-17 PROBLEM — I10 ESSENTIAL HYPERTENSION: Status: ACTIVE | Noted: 2019-04-17

## 2019-04-24 ENCOUNTER — TELEPHONE (OUTPATIENT)
Dept: FAMILY MEDICINE CLINIC | Facility: CLINIC | Age: 74
End: 2019-04-24

## 2019-04-24 RX ORDER — TAMSULOSIN HYDROCHLORIDE 0.4 MG/1
0.8 CAPSULE ORAL DAILY
Qty: 180 CAPSULE | Refills: 1 | Status: SHIPPED | OUTPATIENT
Start: 2019-04-24 | End: 2019-09-14

## 2019-04-24 NOTE — TELEPHONE ENCOUNTER
Patient will need new prescription written for Tamsulosin 0.4 mg , take 2 capsules daily. Please send to [facility]. This was originally written by previous provider.

## 2019-04-25 RX ORDER — AMLODIPINE BESYLATE 5 MG/1
TABLET ORAL
COMMUNITY
End: 2019-12-11 | Stop reason: ALTCHOICE

## 2019-04-25 RX ORDER — POTASSIUM CHLORIDE 750 MG/1
TABLET, EXTENDED RELEASE ORAL
COMMUNITY
End: 2019-12-04 | Stop reason: ALTCHOICE

## 2019-04-25 RX ORDER — OLMESARTAN MEDOXOMIL 20 MG/1
TABLET ORAL
COMMUNITY
End: 2019-12-04 | Stop reason: ALTCHOICE

## 2019-04-25 RX ORDER — TAMSULOSIN HYDROCHLORIDE 0.4 MG/1
CAPSULE ORAL
COMMUNITY
End: 2019-12-04 | Stop reason: ALTCHOICE

## 2019-04-25 RX ORDER — ATENOLOL 25 MG/1
TABLET ORAL
COMMUNITY

## 2019-04-25 RX ORDER — ATORVASTATIN CALCIUM 10 MG/1
TABLET, FILM COATED ORAL
COMMUNITY
End: 2019-09-30 | Stop reason: SDUPTHER

## 2019-04-25 RX ORDER — ISOSORBIDE DINITRATE 30 MG/1
TABLET ORAL
COMMUNITY
End: 2019-12-04 | Stop reason: ALTCHOICE

## 2019-04-25 RX ORDER — METFORMIN HYDROCHLORIDE 500 MG/1
TABLET, EXTENDED RELEASE ORAL
COMMUNITY
End: 2019-12-04 | Stop reason: ALTCHOICE

## 2019-04-29 ENCOUNTER — TELEPHONE (OUTPATIENT)
Dept: FAMILY MEDICINE CLINIC | Facility: CLINIC | Age: 74
End: 2019-04-29

## 2019-04-29 NOTE — TELEPHONE ENCOUNTER
Patient informed of MD recommendations, patient verbalized understanding with intent to comply. Offered opportunity to ask questions, all questions were answered.     Future Appointments   Date Time Provider Agatha Hogan   7/22/2019  9:40 AM Nancy Briggs

## 2019-04-29 NOTE — TELEPHONE ENCOUNTER
Patient had shingles about one month ago and is now cured. He would like to know if he can have the shingles vaccine now-he is aware that we do not have that in the office.

## 2019-05-15 ENCOUNTER — TELEPHONE (OUTPATIENT)
Dept: FAMILY MEDICINE CLINIC | Facility: CLINIC | Age: 74
End: 2019-05-15

## 2019-05-15 NOTE — TELEPHONE ENCOUNTER
See Scanned in under date 5-4-19 consult letter from [address] dated 3-4-19 with Dr. Contreras Rubio that states pt is currently on Simvastatin and Dr. Liz Gonzales gave instructions for pt to finish up the Simvastatin and start Atorvastatin at 10mg.    Informed pharmacy

## 2019-05-15 NOTE — TELEPHONE ENCOUNTER
Pharmacy is calling to state that pt is diabetic and would benefit from a statin.  They are wondering if provider would like to send in a statin for pt

## 2019-07-15 ENCOUNTER — MA CHART PREP (OUTPATIENT)
Dept: FAMILY MEDICINE CLINIC | Facility: CLINIC | Age: 74
End: 2019-07-15

## 2019-07-15 PROBLEM — E66.9 OBESITY WITH SERIOUS COMORBIDITY: Status: ACTIVE | Noted: 2019-07-15

## 2019-07-22 ENCOUNTER — OFFICE VISIT (OUTPATIENT)
Dept: FAMILY MEDICINE CLINIC | Facility: CLINIC | Age: 74
End: 2019-07-22
Payer: COMMERCIAL

## 2019-07-22 VITALS
HEIGHT: 66 IN | BODY MASS INDEX: 34.07 KG/M2 | TEMPERATURE: 98 F | HEART RATE: 60 BPM | OXYGEN SATURATION: 98 % | WEIGHT: 212 LBS | RESPIRATION RATE: 16 BRPM | DIASTOLIC BLOOD PRESSURE: 70 MMHG | SYSTOLIC BLOOD PRESSURE: 118 MMHG

## 2019-07-22 DIAGNOSIS — I10 ESSENTIAL HYPERTENSION, BENIGN: ICD-10-CM

## 2019-07-22 DIAGNOSIS — E78.2 MIXED HYPERLIPIDEMIA: ICD-10-CM

## 2019-07-22 DIAGNOSIS — Z00.00 ROUTINE ADULT HEALTH MAINTENANCE: Primary | ICD-10-CM

## 2019-07-22 PROCEDURE — G0009 ADMIN PNEUMOCOCCAL VACCINE: HCPCS | Performed by: FAMILY MEDICINE

## 2019-07-22 PROCEDURE — 96160 PT-FOCUSED HLTH RISK ASSMT: CPT | Performed by: FAMILY MEDICINE

## 2019-07-22 PROCEDURE — G0439 PPPS, SUBSEQ VISIT: HCPCS | Performed by: FAMILY MEDICINE

## 2019-07-22 PROCEDURE — 99397 PER PM REEVAL EST PAT 65+ YR: CPT | Performed by: FAMILY MEDICINE

## 2019-07-22 PROCEDURE — 90670 PCV13 VACCINE IM: CPT | Performed by: FAMILY MEDICINE

## 2019-07-22 RX ORDER — IRBESARTAN 150 MG/1
150 TABLET ORAL DAILY
Qty: 90 TABLET | Refills: 1 | Status: SHIPPED | OUTPATIENT
Start: 2019-07-22 | End: 2020-04-13

## 2019-07-22 NOTE — PROGRESS NOTES
HPI:   Regine Madden is a 75year old male who presents for a MA (Medicare Advantage) Supervisit (Once per calendar year). Sees jaimie wahl  Eye doctor, [facility], every October.    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     Fasting Blood Sugar (FSB)Annually No results found for: GLUCOSE, GLU    Cardiovascular Disease Screening     LDL Annually No results found for: LDL, LDLC     EKG - w/ Initial Preventative Physical Exam only, or if medically necessa MONITORING Internal Lab or Procedure External Lab or Procedure      Annual Monitoring of Persistent     Medications (ACE/ARB, digoxin diuretics, anticonvulsants.)    Potassium  Annually No results found for: K No flowsheet data found.     Creatinine  Annual

## 2019-07-22 NOTE — TELEPHONE ENCOUNTER
Hypertension Medications Protocol Failed7/21 7:43 PM   CMP or BMP in past 12 months    Last serum creatinine< 2.0    Appointment in past 6 or next 3 months     Requesting IRBESARTAN 150 MG   LOV: 4/16/19  RTC: 3 months  Last Relevant Labs:   Filled: 4/9/19

## 2019-07-24 ENCOUNTER — TELEPHONE (OUTPATIENT)
Dept: FAMILY MEDICINE CLINIC | Facility: CLINIC | Age: 74
End: 2019-07-24

## 2019-07-29 ENCOUNTER — TELEPHONE (OUTPATIENT)
Dept: FAMILY MEDICINE CLINIC | Facility: CLINIC | Age: 74
End: 2019-07-29

## 2019-07-29 NOTE — TELEPHONE ENCOUNTER
Received diabetic eye exam report from [facility]. Flow sheet updated , given to  to sing off.

## 2019-08-30 LAB
ALBUMIN SERPL-MCNC: 4.2 G/DL
ALBUMIN/GLOB SERPL: NORMAL {RATIO}
ALP SERPL-CCNC: 74 U/L
ALT SERPL-CCNC: 19 U/L
ANION GAP SERPL CALC-SCNC: NORMAL MMOL/L
AST SERPL-CCNC: 17 U/L
BILIRUB SERPL-MCNC: 0.5 MG/DL
BUN SERPL-MCNC: 18 MG/DL
BUN/CREAT SERPL: NORMAL
CALCIUM SERPL-MCNC: 8.8 MG/DL
CHLORIDE SERPL-SCNC: 103 MMOL/L
CO2 SERPL-SCNC: 29 MMOL/L
CREAT SERPL-MCNC: 0.94 MG/DL
GLOBULIN SER-MCNC: 2.6 G/DL
GLUCOSE SERPL-MCNC: 98 MG/DL
LENGTH OF FAST TIME PATIENT: NORMAL H
POTASSIUM SERPL-SCNC: 4.3 MMOL/L
PROT SERPL-MCNC: 6.8 G/DL
SODIUM SERPL-SCNC: 138 MMOL/L

## 2019-08-31 LAB
ALBUMIN/GLOBULIN RATIO: 1.6 (CALC) (ref 1–2.5)
ALBUMIN: 4.2 G/DL (ref 3.6–5.1)
ALKALINE PHOSPHATASE: 74 U/L (ref 40–115)
ALT: 19 U/L (ref 9–46)
AST: 17 U/L (ref 10–35)
BILIRUBIN, TOTAL: 0.5 MG/DL (ref 0.2–1.2)
BUN: 18 MG/DL (ref 7–25)
CALCIUM: 8.8 MG/DL (ref 8.6–10.3)
CARBON DIOXIDE: 29 MMOL/L (ref 20–32)
CHLORIDE: 103 MMOL/L (ref 98–110)
CREATININE: 0.94 MG/DL (ref 0.7–1.18)
EGFR IF AFRICN AM: 92 ML/MIN/1.73M2
EGFR IF NONAFRICN AM: 79 ML/MIN/1.73M2
GLOBULIN: 2.6 G/DL (CALC) (ref 1.9–3.7)
GLUCOSE: 98 MG/DL (ref 65–99)
POTASSIUM: 4.3 MMOL/L (ref 3.5–5.3)
PROTEIN, TOTAL: 6.8 G/DL (ref 6.1–8.1)
SODIUM: 138 MMOL/L (ref 135–146)

## 2019-09-16 ENCOUNTER — TELEPHONE (OUTPATIENT)
Dept: FAMILY MEDICINE CLINIC | Facility: CLINIC | Age: 74
End: 2019-09-16

## 2019-09-16 RX ORDER — PREGABALIN 75 MG/1
75 CAPSULE ORAL DAILY
Qty: 90 CAPSULE | Refills: 3 | Status: SHIPPED | OUTPATIENT
Start: 2019-09-16 | End: 2019-09-19

## 2019-09-16 RX ORDER — ATORVASTATIN CALCIUM 10 MG/1
TABLET, FILM COATED ORAL
Qty: 90 TABLET | OUTPATIENT
Start: 2019-09-16

## 2019-09-16 NOTE — TELEPHONE ENCOUNTER
Received prescription request on Lyrica cap from [facility].   LOV: 7/22/19   Last labs ( CMP): 8/30/19  Future Appointments   Date Time Provider Agatha Hogan   1/7/2020 10:00 AM Shelton Heimlich, MD [facility] [address] [facility] [address]

## 2019-09-17 RX ORDER — TAMSULOSIN HYDROCHLORIDE 0.4 MG/1
CAPSULE ORAL
Qty: 180 CAPSULE | Refills: 1 | Status: SHIPPED | OUTPATIENT
Start: 2019-09-17 | End: 2020-04-27

## 2019-09-17 NOTE — TELEPHONE ENCOUNTER
Requested Prescriptions     Pending Prescriptions Disp Refills   • TAMSULOSIN HCL 0.4 MG Oral Cap [Pharmacy Med Name: TAMSULOSIN  0.4MG  CAP] 180 capsule 1     Sig: TAKE 2 CAPSULES BY MOUTH  DAILY     NON PROTOCOL MEDICATION    LOV: 7/22/2019 for physical

## 2019-09-19 NOTE — TELEPHONE ENCOUNTER
Patient states [facility] claim they did not receive the refill for Lyrica. He has been out of his medication for a couple days. He would like a short supply to go to his local pharmacy, [address] [place] [address]. Please advise.

## 2019-09-19 NOTE — TELEPHONE ENCOUNTER
See attached phone note. Re-faxed RX dated 9/16/19 for Lyrica #90 (3) to [facility] with confirmation received.   Last OV 7/22/19 Dr. Heidi Mendoza Physical/HTN/Hyperlipidemia    [facility] [place]

## 2019-09-20 RX ORDER — PREGABALIN 75 MG/1
75 CAPSULE ORAL DAILY
Qty: 30 CAPSULE | Refills: 0 | Status: SHIPPED | OUTPATIENT
Start: 2019-09-20 | End: 2019-10-07

## 2019-09-24 ENCOUNTER — TELEPHONE (OUTPATIENT)
Dept: FAMILY MEDICINE CLINIC | Facility: CLINIC | Age: 74
End: 2019-09-24

## 2019-09-24 RX ORDER — PREGABALIN 75 MG/1
75 CAPSULE ORAL 2 TIMES DAILY
Qty: 60 CAPSULE | Refills: 0 | Status: CANCELLED | OUTPATIENT
Start: 2019-09-24

## 2019-09-24 NOTE — TELEPHONE ENCOUNTER
Pt would like to speak to a nurse in regards to needing clarification on his med   Pregabalin (Cap) TAD     Please call pt

## 2019-09-24 NOTE — TELEPHONE ENCOUNTER
Patient returned call, patient states he was taking 2 tablets a day previously but his script was written for 1 a day. He will try it 1 a day, and see if this works for him.  If he needs it to be increased to 2 a day he will call our office to request a new

## 2019-09-24 NOTE — TELEPHONE ENCOUNTER
LM to contact the office     Medication Quantity Refills Start End   pregabalin (LYRICA) 75 MG Oral Cap 30 capsule 0 9/20/2019    Sig:   Take 1 capsule (75 mg total) by mouth daily.      Route:   Oral     Class:   Print Script

## 2019-09-30 RX ORDER — ATORVASTATIN CALCIUM 10 MG/1
10 TABLET, FILM COATED ORAL DAILY
Qty: 90 TABLET | Refills: 1 | Status: SHIPPED | OUTPATIENT
Start: 2019-09-30 | End: 2020-02-09 | Stop reason: SDUPTHER

## 2019-10-03 ENCOUNTER — OFFICE VISIT (OUTPATIENT)
Dept: FAMILY MEDICINE CLINIC | Facility: CLINIC | Age: 74
End: 2019-10-03
Payer: COMMERCIAL

## 2019-10-03 ENCOUNTER — CLINICAL ABSTRACT (OUTPATIENT)
Dept: CARDIOLOGY | Age: 74
End: 2019-10-03

## 2019-10-03 VITALS
WEIGHT: 211 LBS | BODY MASS INDEX: 33.91 KG/M2 | HEART RATE: 61 BPM | DIASTOLIC BLOOD PRESSURE: 78 MMHG | SYSTOLIC BLOOD PRESSURE: 120 MMHG | HEIGHT: 66 IN | OXYGEN SATURATION: 98 % | TEMPERATURE: 97 F | RESPIRATION RATE: 16 BRPM

## 2019-10-03 DIAGNOSIS — L30.9 DERMATITIS: Primary | ICD-10-CM

## 2019-10-03 PROCEDURE — 99213 OFFICE O/P EST LOW 20 MIN: CPT | Performed by: PHYSICIAN ASSISTANT

## 2019-10-03 PROCEDURE — 90662 IIV NO PRSV INCREASED AG IM: CPT | Performed by: PHYSICIAN ASSISTANT

## 2019-10-03 PROCEDURE — G0008 ADMIN INFLUENZA VIRUS VAC: HCPCS | Performed by: PHYSICIAN ASSISTANT

## 2019-10-03 RX ORDER — ATORVASTATIN CALCIUM 10 MG/1
10 TABLET, FILM COATED ORAL DAILY
COMMUNITY
Start: 2019-09-30 | End: 2021-10-25

## 2019-10-03 RX ORDER — BLOOD-GLUCOSE SENSOR
3 EACH MISCELLANEOUS
COMMUNITY
Start: 2019-09-23 | End: 2021-07-09

## 2019-10-03 RX ORDER — CLOTRIMAZOLE AND BETAMETHASONE DIPROPIONATE 10; .64 MG/G; MG/G
1 CREAM TOPICAL 2 TIMES DAILY
Qty: 45 G | Refills: 0 | Status: SHIPPED | OUTPATIENT
Start: 2019-10-03 | End: 2021-05-06

## 2019-10-03 NOTE — PATIENT INSTRUCTIONS
Thank you for choosing Chuy Charles PA-C at [place] [address]  To Do: Ida Burgess  1. Begin medications as prescribed, use for 1 week  2.   Follow up with PCP in 1 to 2 weeks, sooner if problems    • Please signup for MY CHART, which is electronic company approved your testing, please call Central Scheduling at [phone]  Please allow our office 5 business days to contact you regarding any testing results.     Refill policies:   Allow 3 business days for refills; controlled substances may take amparo

## 2019-10-07 RX ORDER — PREGABALIN 75 MG/1
75 CAPSULE ORAL 2 TIMES DAILY
Qty: 180 CAPSULE | Refills: 0 | Status: SHIPPED | OUTPATIENT
Start: 2019-10-07 | End: 2020-05-18

## 2019-10-07 NOTE — TELEPHONE ENCOUNTER
Patient states there is a misunderstanding regarding his RX for Lyrica. The dose was changed to twice a day, going on vacation on the 20th, please refill so he has enough for his vacation. Please advise.

## 2019-10-07 NOTE — TELEPHONE ENCOUNTER
Spoke with pt, he reports some symptoms came back and he started BACK ON BID dosing for the Lyrica. SET UP RX FOR BID DOSING to [facility].     10/3/19 Last OV Debra Dermatitis  7/22/19 Last Physical OV Dr. Londono Cos with request for 6 month follow up  9/20/

## 2019-10-11 ENCOUNTER — OFFICE VISIT (OUTPATIENT)
Dept: FAMILY MEDICINE CLINIC | Facility: CLINIC | Age: 74
End: 2019-10-11
Payer: COMMERCIAL

## 2019-10-11 VITALS
SYSTOLIC BLOOD PRESSURE: 122 MMHG | DIASTOLIC BLOOD PRESSURE: 80 MMHG | TEMPERATURE: 98 F | BODY MASS INDEX: 33.85 KG/M2 | HEART RATE: 62 BPM | HEIGHT: 66 IN | WEIGHT: 210.63 LBS

## 2019-10-11 DIAGNOSIS — R21 RASH: ICD-10-CM

## 2019-10-11 DIAGNOSIS — M25.511 ACUTE PAIN OF RIGHT SHOULDER: Primary | ICD-10-CM

## 2019-10-11 PROCEDURE — 99213 OFFICE O/P EST LOW 20 MIN: CPT | Performed by: FAMILY MEDICINE

## 2019-10-11 RX ORDER — LEVOTHYROXINE SODIUM 0.07 MG/1
75 TABLET ORAL
COMMUNITY
Start: 2019-10-07

## 2019-10-15 NOTE — PROGRESS NOTES
HPI:   Kristina Hargrove is a 75year old male that presents for Patient presents with: Follow - Up: 1 week rash follow up. Has been using clotrimazole-betamethasone as directed. he is due for A1c, colonoscopy, PSA and LDL Control    Jaw Pain      Rash on right AmLODIPine Besylate (NORVASC) 5 MG Oral Tab, , Disp: , Rfl:   •  atenolol (TENORMIN) 25 MG Oral Tab, , Disp: , Rfl:   •  JARDIANCE 25 MG Oral Tab, , Disp: , Rfl:   •  Vitamin D, Ergocalciferol, (DRISDOL) 87319 UNITS Oral Cap, , Disp: , Rfl:   •  Ferrous Key good turgor. HEART:  Regular rate and rhythm, no murmurs, rubs or gallops. LUNGS: Clear to auscultation bilterally, no rales/rhonchi/wheezing.   ABDOMEN:  Soft, nondistended, nontender, bowel sounds normal in all 4 quadrants, no masses, no hepatosplenomeg

## 2019-10-16 ENCOUNTER — APPOINTMENT (OUTPATIENT)
Dept: CARDIOLOGY | Age: 74
End: 2019-10-16

## 2019-11-08 ENCOUNTER — APPOINTMENT (OUTPATIENT)
Dept: CARDIOLOGY | Age: 74
End: 2019-11-08

## 2019-12-04 ENCOUNTER — OFFICE VISIT (OUTPATIENT)
Dept: CARDIOLOGY | Age: 74
End: 2019-12-04

## 2019-12-04 VITALS
BODY MASS INDEX: 32.78 KG/M2 | HEART RATE: 58 BPM | DIASTOLIC BLOOD PRESSURE: 58 MMHG | WEIGHT: 204 LBS | SYSTOLIC BLOOD PRESSURE: 108 MMHG | HEIGHT: 66 IN

## 2019-12-04 DIAGNOSIS — I10 ESSENTIAL HYPERTENSION: Primary | ICD-10-CM

## 2019-12-04 DIAGNOSIS — E78.00 HYPERCHOLESTEROLEMIA: ICD-10-CM

## 2019-12-04 DIAGNOSIS — I25.10 CORONARY ARTERY DISEASE INVOLVING NATIVE HEART WITHOUT ANGINA PECTORIS, UNSPECIFIED VESSEL OR LESION TYPE: ICD-10-CM

## 2019-12-04 PROCEDURE — 99214 OFFICE O/P EST MOD 30 MIN: CPT | Performed by: INTERNAL MEDICINE

## 2019-12-04 PROCEDURE — 3074F SYST BP LT 130 MM HG: CPT | Performed by: INTERNAL MEDICINE

## 2019-12-04 PROCEDURE — 3078F DIAST BP <80 MM HG: CPT | Performed by: INTERNAL MEDICINE

## 2019-12-04 RX ORDER — MAGNESIUM 200 MG
1000 TABLET ORAL
COMMUNITY
Start: 2015-06-23

## 2019-12-04 RX ORDER — POTASSIUM CHLORIDE 750 MG/1
TABLET, FILM COATED, EXTENDED RELEASE ORAL 2 TIMES DAILY
COMMUNITY
Start: 2019-10-05

## 2019-12-04 RX ORDER — GABAPENTIN 100 MG/1
100 CAPSULE ORAL
COMMUNITY
End: 2019-12-10 | Stop reason: CLARIF

## 2019-12-04 RX ORDER — OLMESARTAN MEDOXOMIL 40 MG/1
40 TABLET ORAL
COMMUNITY
End: 2019-12-11 | Stop reason: ALTCHOICE

## 2019-12-04 RX ORDER — ISOSORBIDE MONONITRATE 30 MG/1
TABLET, EXTENDED RELEASE ORAL DAILY
COMMUNITY
Start: 2015-08-03

## 2019-12-04 RX ORDER — NIACIN 500 MG
500 TABLET ORAL
COMMUNITY
End: 2019-12-11 | Stop reason: CLARIF

## 2019-12-04 RX ORDER — FUROSEMIDE 20 MG/1
20 TABLET ORAL
COMMUNITY
End: 2019-12-10 | Stop reason: DRUGHIGH

## 2019-12-04 RX ORDER — PREGABALIN 75 MG/1
75 CAPSULE ORAL
COMMUNITY
Start: 2019-10-07 | End: 2019-12-10 | Stop reason: CLARIF

## 2019-12-04 RX ORDER — ERGOCALCIFEROL 1.25 MG/1
CAPSULE ORAL
COMMUNITY
Start: 2015-06-23 | End: 2019-12-10 | Stop reason: CLARIF

## 2019-12-04 RX ORDER — TAMSULOSIN HYDROCHLORIDE 0.4 MG/1
CAPSULE ORAL
COMMUNITY
Start: 2019-09-17

## 2019-12-04 RX ORDER — SIMVASTATIN 10 MG
TABLET ORAL AT BEDTIME
COMMUNITY
Start: 2015-07-27 | End: 2019-12-10 | Stop reason: ALTCHOICE

## 2019-12-04 RX ORDER — LEVOTHYROXINE SODIUM 0.07 MG/1
TABLET ORAL DAILY
COMMUNITY
Start: 2019-10-07

## 2019-12-04 RX ORDER — IRBESARTAN 150 MG/1
150 TABLET ORAL
COMMUNITY
Start: 2019-07-22

## 2019-12-04 RX ORDER — LOSARTAN POTASSIUM 25 MG/1
25 TABLET ORAL
COMMUNITY
End: 2019-12-10 | Stop reason: ALTCHOICE

## 2019-12-04 SDOH — SOCIAL STABILITY: SOCIAL NETWORK: ARE YOU MARRIED, WIDOWED, DIVORCED, SEPARATED, NEVER MARRIED, OR LIVING WITH A PARTNER?: MARRIED

## 2019-12-04 SDOH — HEALTH STABILITY: PHYSICAL HEALTH: ON AVERAGE, HOW MANY DAYS PER WEEK DO YOU ENGAGE IN MODERATE TO STRENUOUS EXERCISE (LIKE A BRISK WALK)?: 0 DAYS

## 2019-12-04 SDOH — HEALTH STABILITY: MENTAL HEALTH: HOW OFTEN DO YOU HAVE A DRINK CONTAINING ALCOHOL?: NEVER

## 2019-12-04 SDOH — HEALTH STABILITY: PHYSICAL HEALTH: ON AVERAGE, HOW MANY MINUTES DO YOU ENGAGE IN EXERCISE AT THIS LEVEL?: 0 MIN

## 2019-12-04 ASSESSMENT — ENCOUNTER SYMPTOMS
ALLERGIC/IMMUNOLOGIC COMMENTS: NO NEW FOOD ALLERGIES
HEMATOCHEZIA: 0
CHILLS: 0
WEIGHT LOSS: 0
COUGH: 0
WEIGHT GAIN: 0
HEMOPTYSIS: 0
BRUISES/BLEEDS EASILY: 0
SUSPICIOUS LESIONS: 0
FEVER: 0

## 2019-12-09 ENCOUNTER — E-ADVICE (OUTPATIENT)
Dept: CARDIOLOGY | Age: 74
End: 2019-12-09

## 2019-12-10 RX ORDER — FUROSEMIDE 20 MG/1
20 TABLET ORAL
Refills: 0 | Status: CANCELLED | OUTPATIENT
Start: 2019-12-10

## 2019-12-10 RX ORDER — FUROSEMIDE 20 MG/1
20 TABLET ORAL
COMMUNITY
End: 2019-12-13

## 2019-12-10 NOTE — TELEPHONE ENCOUNTER
Received a new rx requesting from [address] [place] [address]. Patient is requesting a refill on Furosemide TAB.

## 2019-12-12 ENCOUNTER — TELEPHONE (OUTPATIENT)
Dept: CARDIOLOGY | Age: 74
End: 2019-12-12

## 2019-12-12 RX ORDER — ERGOCALCIFEROL 1.25 MG/1
50000 CAPSULE ORAL
COMMUNITY

## 2019-12-12 RX ORDER — NIACIN 500 MG
500 TABLET ORAL
COMMUNITY

## 2019-12-12 RX ORDER — PNV NO.95/FERROUS FUM/FOLIC AC 28MG-0.8MG
325 TABLET ORAL DAILY
COMMUNITY

## 2019-12-12 RX ORDER — FUROSEMIDE 40 MG/1
40 TABLET ORAL DAILY
COMMUNITY

## 2019-12-12 RX ORDER — PREGABALIN 75 MG/1
75 CAPSULE ORAL DAILY
COMMUNITY

## 2019-12-13 RX ORDER — FUROSEMIDE 20 MG/1
20 TABLET ORAL DAILY
Qty: 90 TABLET | Refills: 1 | Status: SHIPPED | OUTPATIENT
Start: 2019-12-13 | End: 2020-04-13

## 2019-12-31 LAB
ALBUMIN SERPL-MCNC: 4.2 G/DL (ref 3.6–5.1)
ALBUMIN/GLOB SERPL: 1.7 (CALC) (ref 1–2.5)
ALP SERPL-CCNC: 69 U/L (ref 40–115)
ALT SERPL-CCNC: 19 U/L (ref 9–46)
AST SERPL-CCNC: 18 U/L (ref 10–35)
BILIRUB SERPL-MCNC: 0.6 MG/DL (ref 0.2–1.2)
BUN SERPL-MCNC: 16 MG/DL (ref 7–25)
BUN/CREAT SERPL: NORMAL (CALC) (ref 6–22)
CALCIUM SERPL-MCNC: 8.7 MG/DL (ref 8.6–10.3)
CHLORIDE SERPL-SCNC: 103 MMOL/L (ref 98–110)
CHOLEST SERPL-MCNC: 142 MG/DL
CHOLEST/HDLC SERPL: 3 (CALC)
CO2 SERPL-SCNC: 32 MMOL/L (ref 20–32)
CREAT SERPL-MCNC: 0.84 MG/DL (ref 0.7–1.18)
GFRSERPLBLD MDRD-ARVRAT: 86 ML/MIN/1.73M2
GLOBULIN SER CALC-MCNC: 2.5 G/DL (CALC) (ref 1.9–3.7)
GLUCOSE SERPL-MCNC: 95 MG/DL (ref 65–99)
HDLC SERPL-MCNC: 47 MG/DL
LDLC SERPL CALC-MCNC: 75 MG/DL (CALC)
NONHDLC SERPL-MCNC: 95 MG/DL (CALC)
POTASSIUM SERPL-SCNC: 4.4 MMOL/L (ref 3.5–5.3)
PROT SERPL-MCNC: 6.7 G/DL (ref 6.1–8.1)
SODIUM SERPL-SCNC: 139 MMOL/L (ref 135–146)
TRIGL SERPL-MCNC: 122 MG/DL

## 2020-01-03 ENCOUNTER — TELEPHONE (OUTPATIENT)
Dept: CARDIOLOGY | Age: 75
End: 2020-01-03

## 2020-01-07 ENCOUNTER — OFFICE VISIT (OUTPATIENT)
Dept: FAMILY MEDICINE CLINIC | Facility: CLINIC | Age: 75
End: 2020-01-07
Payer: COMMERCIAL

## 2020-01-07 VITALS
RESPIRATION RATE: 16 BRPM | HEART RATE: 64 BPM | SYSTOLIC BLOOD PRESSURE: 120 MMHG | HEIGHT: 66 IN | WEIGHT: 207.63 LBS | TEMPERATURE: 98 F | DIASTOLIC BLOOD PRESSURE: 70 MMHG | BODY MASS INDEX: 33.37 KG/M2

## 2020-01-07 DIAGNOSIS — M54.50 BILATERAL LOW BACK PAIN, UNSPECIFIED CHRONICITY, UNSPECIFIED WHETHER SCIATICA PRESENT: Primary | ICD-10-CM

## 2020-01-07 DIAGNOSIS — Z00.00 ROUTINE GENERAL MEDICAL EXAMINATION AT A HEALTH CARE FACILITY: ICD-10-CM

## 2020-01-07 DIAGNOSIS — E78.2 MIXED HYPERLIPIDEMIA: ICD-10-CM

## 2020-01-07 DIAGNOSIS — Z12.11 COLON CANCER SCREENING: ICD-10-CM

## 2020-01-07 DIAGNOSIS — I10 ESSENTIAL HYPERTENSION, BENIGN: ICD-10-CM

## 2020-01-07 PROCEDURE — 99214 OFFICE O/P EST MOD 30 MIN: CPT | Performed by: FAMILY MEDICINE

## 2020-01-07 NOTE — PROGRESS NOTES
HPI:   Bebeto Chang is a 75year old male that presents for Patient presents with:  Physical: Supervisit- Is due for a colonoscopy. Patient had labs completed on 12/31/2019 will make copies of reports. Patient stated last eye exam was completed in 11/2019 i Tab, Take 40 mg by mouth., Disp: , Rfl:   •  AmLODIPine Besylate (NORVASC) 5 MG Oral Tab, , Disp: , Rfl:   •  atenolol (TENORMIN) 25 MG Oral Tab, , Disp: , Rfl:   •  JARDIANCE 25 MG Oral Tab, , Disp: , Rfl:   •  Vitamin D, Ergocalciferol, (DRISDOL) 67894 U thyromegaly. SKIN: No rashes, no skin lesion, no bruising, good turgor. HEART:  Regular rate and rhythm, no murmurs, rubs or gallops. LUNGS: Clear to auscultation bilterally, no rales/rhonchi/wheezing.   ABDOMEN:  Soft, nondistended, nontender, bowel mallorie

## 2020-01-17 LAB — PSA, TOTAL: 0.6 NG/ML

## 2020-01-19 ENCOUNTER — HOSPITAL ENCOUNTER (OUTPATIENT)
Age: 75
Discharge: HOME | End: 2020-01-19
Attending: EMERGENCY MEDICINE
Payer: MEDICARE

## 2020-01-19 ENCOUNTER — APPOINTMENT (OUTPATIENT)
Dept: GENERAL RADIOLOGY | Age: 75
End: 2020-01-19
Attending: EMERGENCY MEDICINE
Payer: MEDICARE

## 2020-01-19 VITALS
TEMPERATURE: 98 F | OXYGEN SATURATION: 98 % | DIASTOLIC BLOOD PRESSURE: 79 MMHG | SYSTOLIC BLOOD PRESSURE: 139 MMHG | HEART RATE: 68 BPM | HEIGHT: 66 IN | BODY MASS INDEX: 33.75 KG/M2 | RESPIRATION RATE: 20 BRPM | WEIGHT: 210 LBS

## 2020-01-19 DIAGNOSIS — Z92.29 UP TO DATE WITH DIPHTHERIA-TETANUS VACCINATION: ICD-10-CM

## 2020-01-19 DIAGNOSIS — S90.454A FOREIGN BODY OF TOE OF RIGHT FOOT, INITIAL ENCOUNTER: Primary | ICD-10-CM

## 2020-01-19 PROCEDURE — 28190 REMOVAL OF FOOT FOREIGN BODY: CPT

## 2020-01-19 PROCEDURE — 73660 X-RAY EXAM OF TOE(S): CPT | Performed by: EMERGENCY MEDICINE

## 2020-01-19 PROCEDURE — 99204 OFFICE O/P NEW MOD 45 MIN: CPT

## 2020-01-19 PROCEDURE — 90471 IMMUNIZATION ADMIN: CPT

## 2020-01-19 PROCEDURE — 99213 OFFICE O/P EST LOW 20 MIN: CPT

## 2020-01-19 RX ORDER — CEPHALEXIN 250 MG/1
250 CAPSULE ORAL 4 TIMES DAILY
Qty: 40 CAPSULE | Refills: 0 | Status: SHIPPED | OUTPATIENT
Start: 2020-01-19 | End: 2020-01-29

## 2020-01-19 RX ORDER — ACETAMINOPHEN 500 MG
500 TABLET ORAL ONCE
Status: COMPLETED | OUTPATIENT
Start: 2020-01-19 | End: 2020-01-19

## 2020-01-19 NOTE — ED PROVIDER NOTES
Patient Seen in: [facility]      History   Patient presents with:  FB in Skin    Stated Complaint: Wound on toe    HPI    43-year-old male past medical history of hypertension hyperlipidemia diabetes now presents ED with complai Pupils: Pupils are equal, round, and reactive to light. Neck:      Musculoskeletal: Normal range of motion and neck supple. Cardiovascular:      Rate and Rhythm: Normal rate and regular rhythm.    Pulmonary:      Effort: Pulmonary effort is normal. physician in 2 days for wound recheck. Discussed changing the bandage in 24 hours. Discussed follow-up with primary care physician discussed follow-up with podiatry. Patient shows understanding of plan and is agreeable with plan.   Discharged home improv

## 2020-01-19 NOTE — ED INITIAL ASSESSMENT (HPI)
Patient reports puncture wound to right big toe. Last night stepped to a piece of broken glass. Also with pain to bottom of the left foot, base of the 5th toe.

## 2020-01-21 ENCOUNTER — OFFICE VISIT (OUTPATIENT)
Dept: FAMILY MEDICINE CLINIC | Facility: CLINIC | Age: 75
End: 2020-01-21
Payer: COMMERCIAL

## 2020-01-21 VITALS
HEIGHT: 66 IN | SYSTOLIC BLOOD PRESSURE: 129 MMHG | DIASTOLIC BLOOD PRESSURE: 70 MMHG | BODY MASS INDEX: 33.62 KG/M2 | WEIGHT: 209.19 LBS | RESPIRATION RATE: 16 BRPM | HEART RATE: 75 BPM | TEMPERATURE: 98 F

## 2020-01-21 DIAGNOSIS — S91.111A LACERATION OF RIGHT GREAT TOE WITHOUT FOREIGN BODY PRESENT OR DAMAGE TO NAIL, INITIAL ENCOUNTER: Primary | ICD-10-CM

## 2020-01-21 DIAGNOSIS — H92.09 OTALGIA, UNSPECIFIED LATERALITY: ICD-10-CM

## 2020-01-21 DIAGNOSIS — M54.50 ACUTE LOW BACK PAIN WITHOUT SCIATICA, UNSPECIFIED BACK PAIN LATERALITY: ICD-10-CM

## 2020-01-21 PROCEDURE — 99214 OFFICE O/P EST MOD 30 MIN: CPT | Performed by: FAMILY MEDICINE

## 2020-01-21 NOTE — PROGRESS NOTES
HPI:   Leonela Zavaleta is a 75year old male that presents for Patient presents with:  Wound Recheck: [facility] follow up from 1/19/20. States that he stepped on glass rt great toe.  Currently taking  right great toe pain, has 7 days left of anti clotrimazole-betamethasone 1-0.05 % External Cream, Apply 1 Application topically 2 (two) times daily.  X 1 week, Disp: 45 g, Rfl: 0  •  TAMSULOSIN HCL 0.4 MG Oral Cap, TAKE 2 CAPSULES BY MOUTH  DAILY, Disp: 180 capsule, Rfl: 1  •  IRBESARTAN 150 MG Oral Ta awake and oriented, well developed, well nourished, no apparent distress.   HEENT:     Head:  Normocephalic, atraumatic    Eyes: EOMI, PERRLA, no scleral icterus, conjunctivae clear, no eye discharge    Ears: External normal. TMs normal without erythema or

## 2020-01-25 ENCOUNTER — TELEPHONE (OUTPATIENT)
Dept: FAMILY MEDICINE CLINIC | Facility: CLINIC | Age: 75
End: 2020-01-25

## 2020-01-25 DIAGNOSIS — M54.50 ACUTE LOW BACK PAIN WITHOUT SCIATICA, UNSPECIFIED BACK PAIN LATERALITY: Primary | ICD-10-CM

## 2020-01-25 RX ORDER — TRAMADOL HYDROCHLORIDE 50 MG/1
50 TABLET ORAL EVERY 6 HOURS PRN
Qty: 30 TABLET | Refills: 0 | Status: SHIPPED | OUTPATIENT
Start: 2020-01-25 | End: 2020-10-16

## 2020-01-25 NOTE — TELEPHONE ENCOUNTER
On-call: patient calling regarding worsening back pain for 5 days. Reports excruciating back pain that is non radiating 7/10., gait normal. Hurts  when he stands up. Reviewed chart, no past history of imaging noted. He did not go to the ER.  No fall or inj

## 2020-01-27 ENCOUNTER — OFFICE VISIT (OUTPATIENT)
Dept: FAMILY MEDICINE CLINIC | Facility: CLINIC | Age: 75
End: 2020-01-27
Payer: COMMERCIAL

## 2020-01-27 VITALS
RESPIRATION RATE: 18 BRPM | SYSTOLIC BLOOD PRESSURE: 130 MMHG | BODY MASS INDEX: 33.33 KG/M2 | TEMPERATURE: 98 F | HEIGHT: 66 IN | HEART RATE: 70 BPM | DIASTOLIC BLOOD PRESSURE: 72 MMHG | WEIGHT: 207.38 LBS

## 2020-01-27 DIAGNOSIS — S91.119D: ICD-10-CM

## 2020-01-27 DIAGNOSIS — E11.65 TYPE 2 DIABETES MELLITUS, UNCONTROLLED, WITH NEUROPATHY (HCC): ICD-10-CM

## 2020-01-27 DIAGNOSIS — E11.40 TYPE 2 DIABETES MELLITUS, UNCONTROLLED, WITH NEUROPATHY (HCC): ICD-10-CM

## 2020-01-27 DIAGNOSIS — M54.50 ACUTE LEFT-SIDED LOW BACK PAIN, UNSPECIFIED WHETHER SCIATICA PRESENT: Primary | ICD-10-CM

## 2020-01-27 PROCEDURE — 99214 OFFICE O/P EST MOD 30 MIN: CPT | Performed by: FAMILY MEDICINE

## 2020-01-27 RX ORDER — PREDNISONE 20 MG/1
20 TABLET ORAL 2 TIMES DAILY
Qty: 10 TABLET | Refills: 0 | Status: SHIPPED | OUTPATIENT
Start: 2020-01-27 | End: 2020-02-01

## 2020-01-27 NOTE — PROGRESS NOTES
HPI:   Regine Madden is a 75year old male that presents for a f/u for back pain. 3 days ago, pt states that his back pain was severe and could not walk due to the pain and spasms in the lower back.  Turning in the bed worsened the pain and he had difficulty atorvastatin 10 MG Oral Tab, Take 10 mg by mouth., Disp: , Rfl:   •  clotrimazole-betamethasone 1-0.05 % External Cream, Apply 1 Application topically 2 (two) times daily.  X 1 week, Disp: 45 g, Rfl: 0  •  TAMSULOSIN HCL 0.4 MG Oral Cap, TAKE 2 CAPSULES BY stated age, well groomed. Physical Exam:  GEN:  Patient is alert, awake and oriented, well developed, well nourished, no apparent distress.   HEENT:     Head:  Normocephalic, atraumatic    Eyes: EOMI, PERRLA, no scleral icterus, conjunctivae clear, no eye No follow-ups on file. Reshma Neff M.D. Family Medicine   1/27/2020  10:50 AM    Charting performed by evelyn Gomez for Dr. Jennifer Joseph.   The evelyn’s documentation has been prepared under my direction and personally reviewed by me in its enti

## 2020-01-29 ENCOUNTER — TELEPHONE (OUTPATIENT)
Dept: FAMILY MEDICINE CLINIC | Facility: CLINIC | Age: 75
End: 2020-01-29

## 2020-01-29 NOTE — TELEPHONE ENCOUNTER
Per Dr. Baltazar Band: continue course, inflammation improving. Informed pt of Dr. Cheryl Flores review and recommendations and pt expressed full understanding and agreement. Task completed.

## 2020-01-29 NOTE — TELEPHONE ENCOUNTER
Spoke to pt, pt reports \"No more pain, should I stop or taper off Prednisone? \". Pt states he saw improvement starting Tuesday, no pain \"at all today\". Pt took 2 tablets on Monday, 2 tabs on Tuesday and 1 tab this morning.      1/27/2020 OV Dr. Marisol back

## 2020-02-11 RX ORDER — ATORVASTATIN CALCIUM 10 MG/1
TABLET, FILM COATED ORAL
Qty: 90 TABLET | Refills: 3 | Status: SHIPPED | OUTPATIENT
Start: 2020-02-11

## 2020-03-01 ENCOUNTER — MED REC SCAN ONLY (OUTPATIENT)
Dept: FAMILY MEDICINE CLINIC | Facility: CLINIC | Age: 75
End: 2020-03-01

## 2020-03-30 ENCOUNTER — TELEPHONE (OUTPATIENT)
Dept: FAMILY MEDICINE CLINIC | Facility: CLINIC | Age: 75
End: 2020-03-30

## 2020-03-30 RX ORDER — POTASSIUM CHLORIDE 750 MG/1
10 TABLET, EXTENDED RELEASE ORAL DAILY
Qty: 90 TABLET | Refills: 0 | Status: SHIPPED | OUTPATIENT
Start: 2020-03-30 | End: 2020-03-31 | Stop reason: CLARIF

## 2020-03-30 NOTE — TELEPHONE ENCOUNTER
Due to the recent COVID-19 pandemic. One time 90 day refill for non protocol medication.   LOV 1/27/20

## 2020-03-30 NOTE — TELEPHONE ENCOUNTER
Pt needs refills on   Potassium Chloride ER 10 MEQ Oral Tab CR       Sig:   Take 10 mEq by mouth.        [facility], please advise

## 2020-03-31 ENCOUNTER — TELEPHONE (OUTPATIENT)
Dept: FAMILY MEDICINE CLINIC | Facility: CLINIC | Age: 75
End: 2020-03-31

## 2020-03-31 DIAGNOSIS — I10 ESSENTIAL HYPERTENSION, BENIGN: Primary | ICD-10-CM

## 2020-03-31 RX ORDER — POTASSIUM CHLORIDE 750 MG/1
10 TABLET, FILM COATED, EXTENDED RELEASE ORAL DAILY
Qty: 90 TABLET | Refills: 0 | Status: SHIPPED | OUTPATIENT
Start: 2020-03-31 | End: 2020-04-20

## 2020-03-31 NOTE — TELEPHONE ENCOUNTER
- Pt was told by pharmacy that they sent a fax to clarify the formula for   Potassium Chloride ER 10 MEQ Oral Tab CR 90 tablet 0 3/30/2020     Sig - Route:  Take 1 tablet (10 mEq total) by mouth daily. - Oral    Sent to pharmacy as: Potassium Chlor

## 2020-03-31 NOTE — TELEPHONE ENCOUNTER
Dr Marcelina Young I was switching his Potassium to ER not crystals, but his last K+ level was 4.4, should he still be taking potassium?

## 2020-03-31 NOTE — TELEPHONE ENCOUNTER
Yes, continue potassium. I have ordered a BMP to be done in 1 mth at [facility].     Please inform patient.

## 2020-04-01 NOTE — TELEPHONE ENCOUNTER
Pt informed of RX to pharmacy and BMP order to [facility] due in 1 month. Pt expressed full understanding and agreement. Task completed.

## 2020-04-13 RX ORDER — IRBESARTAN 150 MG/1
150 TABLET ORAL DAILY
Qty: 90 TABLET | Refills: 0 | Status: SHIPPED | OUTPATIENT
Start: 2020-04-13 | End: 2020-06-15

## 2020-04-13 RX ORDER — FUROSEMIDE 20 MG/1
20 TABLET ORAL DAILY
Qty: 90 TABLET | Refills: 0 | Status: SHIPPED | OUTPATIENT
Start: 2020-04-13 | End: 2020-06-15

## 2020-04-13 NOTE — TELEPHONE ENCOUNTER
furosemide 20 MG Oral Tab               Sig: Take 1 tablet (20 mg total) by mouth daily.     Disp:  90 tablet    Refills:  0    Start: 4/13/2020    Class: Normal    Non-formulary    Last ordered: 4 months ago by Mauricio Villareal MD     Hypertension Medicatio

## 2020-04-20 ENCOUNTER — TELEPHONE (OUTPATIENT)
Dept: FAMILY MEDICINE CLINIC | Facility: CLINIC | Age: 75
End: 2020-04-20

## 2020-04-20 DIAGNOSIS — Z79.899 HISTORY OF ONGOING TREATMENT WITH HIGH-RISK MEDICATION: Primary | ICD-10-CM

## 2020-04-20 RX ORDER — POTASSIUM CHLORIDE 750 MG/1
10 TABLET, FILM COATED, EXTENDED RELEASE ORAL 2 TIMES DAILY
Qty: 180 TABLET | Refills: 0 | Status: SHIPPED | OUTPATIENT
Start: 2020-04-20

## 2020-04-20 NOTE — TELEPHONE ENCOUNTER
Informed pt of Dr. Jefferson Rios recommendations and pt expressed understanding and agreement. Task completed.

## 2020-04-20 NOTE — TELEPHONE ENCOUNTER
- Pt is calling to clarify instructions to take 1 time daily. Pt was taking 2 per day. Potassium Chloride ER 10 MEQ Oral Tab CR 90 tablet 0 3/31/2020     Sig - Route: Take 1 tablet (10 mEq total) by mouth daily. - Oral      Please advise.  61

## 2020-04-20 NOTE — TELEPHONE ENCOUNTER
See attached phone message. See 4/1/20 Med Red Scan of [facility] clarifying dose with Dr. Vicenta Price signing daily Potassium 10mg ER.      See last lab 1/22/20 scan date of Potassium at 4.4 normal dated 12/31/19.     1/27/20 OV Dr. Creola Cheadle Acute left side low

## 2020-04-27 RX ORDER — TAMSULOSIN HYDROCHLORIDE 0.4 MG/1
CAPSULE ORAL
Qty: 90 CAPSULE | Refills: 0 | Status: SHIPPED | OUTPATIENT
Start: 2020-04-27 | End: 2020-05-26

## 2020-04-27 NOTE — TELEPHONE ENCOUNTER
Requested Prescriptions     Pending Prescriptions Disp Refills   • tamsulosin (FLOMAX) cap [Pharmacy Med Name: TAMSULOSIN  0.4MG  CAP] 90 capsule 0     Sig: TAKE 2 CAPSULES BY MOUTH  DAILY     LOV: 1/27/20 for acute back pain  RTC: 1 week if don't improve

## 2020-05-18 RX ORDER — PREGABALIN 75 MG/1
75 CAPSULE ORAL 2 TIMES DAILY
Qty: 180 CAPSULE | Refills: 1 | Status: SHIPPED | OUTPATIENT
Start: 2020-05-18 | End: 2020-05-27

## 2020-05-18 RX ORDER — ATENOLOL 25 MG/1
25 TABLET ORAL DAILY
Qty: 90 TABLET | Refills: 1 | Status: SHIPPED | OUTPATIENT
Start: 2020-05-18 | End: 2020-10-06

## 2020-05-18 NOTE — TELEPHONE ENCOUNTER
Hypertension Medications Protocol Passed5/18 4:22 PM   CMP or BMP in past 12 months    Last serum creatinine< 2.0    Appointment in past 6 or next 3 months     Requesting atenolol 25 MG, pregabalin (LYRICA) 75 MG    LOV: 1/27/20  RTC: 6 months  Last Releva

## 2020-05-26 ENCOUNTER — TELEPHONE (OUTPATIENT)
Dept: FAMILY MEDICINE CLINIC | Facility: CLINIC | Age: 75
End: 2020-05-26

## 2020-05-26 RX ORDER — TAMSULOSIN HYDROCHLORIDE 0.4 MG/1
0.8 CAPSULE ORAL DAILY
Qty: 180 CAPSULE | Refills: 0 | Status: SHIPPED | OUTPATIENT
Start: 2020-05-26 | End: 2020-08-09

## 2020-05-26 NOTE — TELEPHONE ENCOUNTER
Spoke with pt, states he only got #90 for his Flomax. States he takes 2 tablets daily. Confirmed in medication list pt takes 2 tablets. Rx sent to [facility] for #180.

## 2020-05-27 NOTE — TELEPHONE ENCOUNTER
Yakov Jacobsen Pt is calling to request refill for   [place], [place] - [address] [place] [address] [phone], [phone]   Outpatient Medication Detail      Disp Refills Start End    pregabalin (LYRICA) 75 MG Oral Cap 180 capsule 1 5/18/202

## 2020-05-27 NOTE — TELEPHONE ENCOUNTER
Refilled on 518/20 # 180 with 1 refill. Changed script, please send new one if appropriate.  Thank you

## 2020-05-28 RX ORDER — PREGABALIN 75 MG/1
75 CAPSULE ORAL DAILY
Qty: 90 CAPSULE | Refills: 1 | Status: SHIPPED | OUTPATIENT
Start: 2020-05-28 | End: 2020-09-03

## 2020-06-15 RX ORDER — IRBESARTAN 150 MG/1
TABLET ORAL
Qty: 90 TABLET | Refills: 1 | Status: SHIPPED | OUTPATIENT
Start: 2020-06-15 | End: 2021-03-08

## 2020-06-15 RX ORDER — FUROSEMIDE 20 MG/1
TABLET ORAL
Qty: 90 TABLET | Refills: 1 | Status: SHIPPED | OUTPATIENT
Start: 2020-06-15 | End: 2021-09-22

## 2020-06-15 NOTE — TELEPHONE ENCOUNTER
LOV: 1/27/20  Labs: 12/31/19  Both Filled: 4/13/20 # 90 with 0 refills each    IRBESARTAN  150MG  TAB          Will file in chart as: IRBESARTAN 150 MG Oral Tab         Sig: TAKE 1 TABLET BY MOUTH  DAILY    Disp:  90 tablet    Refills:  0 (Pharmacy request

## 2020-06-16 ENCOUNTER — MA CHART PREP (OUTPATIENT)
Dept: FAMILY MEDICINE CLINIC | Facility: CLINIC | Age: 75
End: 2020-06-16

## 2020-06-22 RX ORDER — POTASSIUM CHLORIDE 750 MG/1
TABLET, EXTENDED RELEASE ORAL
Qty: 180 TABLET | Refills: 0 | Status: SHIPPED | OUTPATIENT
Start: 2020-06-22 | End: 2020-10-16

## 2020-06-22 NOTE — TELEPHONE ENCOUNTER
Requesting: Potassium  LOV: 1/27/2020  RTC: -  Last Relevant Labs: CMP done 12/31/19 -> see scanned labs  Filled: 4/20/2020 #180 with 0 refills    Future Appointments   Date Time Provider Agatha Samira   7/27/2020 10:00 AM Tiarra Abdi MD [facility]

## 2020-07-01 LAB
BUN: 17 MG/DL (ref 7–25)
CALCIUM: 9 MG/DL (ref 8.6–10.3)
CARBON DIOXIDE: 26 MMOL/L (ref 20–32)
CHLORIDE: 105 MMOL/L (ref 98–110)
CREATININE: 0.85 MG/DL (ref 0.7–1.18)
EGFR IF AFRICN AM: 98 ML/MIN/1.73M2
EGFR IF NONAFRICN AM: 85 ML/MIN/1.73M2
GLUCOSE: 144 MG/DL (ref 65–99)
POTASSIUM: 4.5 MMOL/L (ref 3.5–5.3)
SODIUM: 140 MMOL/L (ref 135–146)

## 2020-07-27 ENCOUNTER — OFFICE VISIT (OUTPATIENT)
Dept: FAMILY MEDICINE CLINIC | Facility: CLINIC | Age: 75
End: 2020-07-27
Payer: COMMERCIAL

## 2020-07-27 VITALS
OXYGEN SATURATION: 95 % | SYSTOLIC BLOOD PRESSURE: 127 MMHG | TEMPERATURE: 98 F | HEART RATE: 61 BPM | BODY MASS INDEX: 32.14 KG/M2 | HEIGHT: 66 IN | WEIGHT: 200 LBS | RESPIRATION RATE: 16 BRPM | DIASTOLIC BLOOD PRESSURE: 70 MMHG

## 2020-07-27 DIAGNOSIS — E11.65 TYPE 2 DIABETES MELLITUS, UNCONTROLLED, WITH NEUROPATHY (HCC): ICD-10-CM

## 2020-07-27 DIAGNOSIS — E11.40 TYPE 2 DIABETES MELLITUS, UNCONTROLLED, WITH NEUROPATHY (HCC): ICD-10-CM

## 2020-07-27 DIAGNOSIS — M54.50 LOW BACK PAIN, UNSPECIFIED BACK PAIN LATERALITY, UNSPECIFIED CHRONICITY, UNSPECIFIED WHETHER SCIATICA PRESENT: ICD-10-CM

## 2020-07-27 DIAGNOSIS — Z00.00 ROUTINE GENERAL MEDICAL EXAMINATION AT A HEALTH CARE FACILITY: Primary | ICD-10-CM

## 2020-07-27 PROCEDURE — 3008F BODY MASS INDEX DOCD: CPT | Performed by: FAMILY MEDICINE

## 2020-07-27 PROCEDURE — G0439 PPPS, SUBSEQ VISIT: HCPCS | Performed by: FAMILY MEDICINE

## 2020-07-27 PROCEDURE — 3074F SYST BP LT 130 MM HG: CPT | Performed by: FAMILY MEDICINE

## 2020-07-27 PROCEDURE — 99397 PER PM REEVAL EST PAT 65+ YR: CPT | Performed by: FAMILY MEDICINE

## 2020-07-27 PROCEDURE — 96160 PT-FOCUSED HLTH RISK ASSMT: CPT | Performed by: FAMILY MEDICINE

## 2020-07-27 PROCEDURE — 3078F DIAST BP <80 MM HG: CPT | Performed by: FAMILY MEDICINE

## 2020-07-27 NOTE — PROGRESS NOTES
HPI:   Annalisa Meredith is a 67year old male who presents for a MA (Medicare Advantage) Supervisit (Once per calendar year). Patient feels well, no complaints except having some low back pain off and on for the past several weeks.   He gets this intermi medications?: Yes    Hearing Problems?: Yes(has hearing aids on )     Functional Status     Hearing Problems?: Yes(has hearing aids on )    Vision Problems? 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 Reinforced healthy diet, lifestyle, and exercise.      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     Glaucoma Screening      Ophthalmology Visit Annually: Diabetics, FHx Glaucoma, AA>50, > 65 Data entered on: 11/14/2018   Last Dilated Eye Exam 11/14/2018       Prostate Cancer Screening Annually No results found for: A1C    No flowsheet data found. Creat/alb ratio  Annually No results found for: MICROALBCREA, MALBCRECALC     LDL  Annually No results found for: LDL, LDLC No flowsheet data found.      Dilated Eye exam  Annually Data enter

## 2020-08-05 ENCOUNTER — APPOINTMENT (OUTPATIENT)
Dept: CARDIOLOGY | Age: 75
End: 2020-08-05

## 2020-08-07 NOTE — TELEPHONE ENCOUNTER
Name from pharmacy: TAMSULOSIN  0.4MG  CAP          Will file in chart as: tamsulosin (FLOMAX) cap         Sig: TAKE 2 CAPSULES BY MOUTH  DAILY    Disp:  180 capsule    Refills:  3    Start: 8/7/2020    Class: Normal    Non-formulary    To pharmacy: [place]

## 2020-08-09 RX ORDER — TAMSULOSIN HYDROCHLORIDE 0.4 MG/1
CAPSULE ORAL
Qty: 180 CAPSULE | Refills: 3 | Status: SHIPPED | OUTPATIENT
Start: 2020-08-09 | End: 2021-04-28

## 2020-08-13 ENCOUNTER — TELEPHONE (OUTPATIENT)
Dept: FAMILY MEDICINE CLINIC | Facility: CLINIC | Age: 75
End: 2020-08-13

## 2020-08-13 NOTE — TELEPHONE ENCOUNTER
Pt needs refill on his test strips sent to [place] pharmacy on file, please call pt when script has been sent        Pt called back and ask me to cancel his request because his sister in law gave him some test strips and he ordered some with his mail order

## 2020-08-21 ENCOUNTER — APPOINTMENT (OUTPATIENT)
Dept: CARDIOLOGY | Age: 75
End: 2020-08-21

## 2020-09-02 NOTE — TELEPHONE ENCOUNTER
Received a refill request fax from [address] [place] [address] for Pregabalin CAP Rx. Last OV:7/27/20  NO PROTOCOL   Last time medication was refilled:5/28/20#90 WITH 1 REFILL   Medication routed to PCP for refill if appropriate. No future appointments.

## 2020-09-03 RX ORDER — PREGABALIN 75 MG/1
75 CAPSULE ORAL DAILY
Qty: 90 CAPSULE | Refills: 1 | Status: SHIPPED | OUTPATIENT
Start: 2020-09-03 | End: 2021-04-05

## 2020-09-14 ENCOUNTER — VIRTUAL PHONE E/M (OUTPATIENT)
Dept: FAMILY MEDICINE CLINIC | Facility: CLINIC | Age: 75
End: 2020-09-14
Payer: COMMERCIAL

## 2020-09-14 DIAGNOSIS — R12 HEARTBURN: Primary | ICD-10-CM

## 2020-09-14 PROCEDURE — 99213 OFFICE O/P EST LOW 20 MIN: CPT | Performed by: FAMILY MEDICINE

## 2020-09-14 NOTE — PROGRESS NOTES
TELEMEDICINE VISIT by phone  This visit is conducted using Telemedicine with live, interactive audio.      Verification of patient identity was established by the  patient (s)  Misa Akers verbally consents to a telemedicine Take 1 tablet (25 mg total) by mouth daily. , Disp: 90 tablet, Rfl: 1  •  Potassium Chloride ER 10 MEQ Oral Tab CR, Take 1 tablet (10 mEq total) by mouth 2 (two) times daily. , Disp: 180 tablet, Rfl: 0  •  traMADol HCl 50 MG Oral Tab, Take 1 tablet (50 mg to calculated from the following:    Height as of 7/27/20: 66\". Weight as of 7/27/20: 200 lb (90.7 kg). No Vital Signs due to telemedicine visit  Physical Exam:  GEN:  Patient is alert, awake and oriented, no apparent distress.       ASSESSMENT AND PLAN:

## 2020-10-06 RX ORDER — ATENOLOL 25 MG/1
TABLET ORAL
Qty: 90 TABLET | Refills: 0 | Status: SHIPPED | OUTPATIENT
Start: 2020-10-06 | End: 2021-09-20

## 2020-10-06 NOTE — TELEPHONE ENCOUNTER
Requested Prescriptions     Name from pharmacy: ATENOLOL  25MG  TAB         Will file in chart as: ATENOLOL 25 MG Oral Tab    Sig: TAKE 1 TABLET BY MOUTH  DAILY    Disp:  90 tablet    Refills:  3    Start: 10/5/2020    Class: Normal    Non-formulary    To

## 2020-10-14 ENCOUNTER — OFFICE VISIT (OUTPATIENT)
Dept: CARDIOLOGY | Age: 75
End: 2020-10-14

## 2020-10-14 VITALS
SYSTOLIC BLOOD PRESSURE: 136 MMHG | WEIGHT: 206 LBS | DIASTOLIC BLOOD PRESSURE: 86 MMHG | HEIGHT: 66 IN | HEART RATE: 72 BPM | BODY MASS INDEX: 33.11 KG/M2

## 2020-10-14 DIAGNOSIS — I27.20 PULMONARY HTN (CMD): ICD-10-CM

## 2020-10-14 DIAGNOSIS — E78.00 HYPERCHOLESTEROLEMIA: Primary | ICD-10-CM

## 2020-10-14 DIAGNOSIS — I10 ESSENTIAL HYPERTENSION: ICD-10-CM

## 2020-10-14 DIAGNOSIS — I25.10 CORONARY ARTERY DISEASE INVOLVING NATIVE HEART WITHOUT ANGINA PECTORIS, UNSPECIFIED VESSEL OR LESION TYPE: ICD-10-CM

## 2020-10-14 PROCEDURE — 99214 OFFICE O/P EST MOD 30 MIN: CPT | Performed by: INTERNAL MEDICINE

## 2020-10-14 SDOH — HEALTH STABILITY: PHYSICAL HEALTH: ON AVERAGE, HOW MANY MINUTES DO YOU ENGAGE IN EXERCISE AT THIS LEVEL?: 0 MIN

## 2020-10-14 SDOH — SOCIAL STABILITY: SOCIAL NETWORK: ARE YOU MARRIED, WIDOWED, DIVORCED, SEPARATED, NEVER MARRIED, OR LIVING WITH A PARTNER?: MARRIED

## 2020-10-14 SDOH — HEALTH STABILITY: MENTAL HEALTH: HOW OFTEN DO YOU HAVE A DRINK CONTAINING ALCOHOL?: NEVER

## 2020-10-14 SDOH — HEALTH STABILITY: PHYSICAL HEALTH: ON AVERAGE, HOW MANY DAYS PER WEEK DO YOU ENGAGE IN MODERATE TO STRENUOUS EXERCISE (LIKE A BRISK WALK)?: 0 DAYS

## 2020-10-14 ASSESSMENT — ENCOUNTER SYMPTOMS
WEIGHT LOSS: 0
ALLERGIC/IMMUNOLOGIC COMMENTS: NO NEW FOOD ALLERGIES
HEMOPTYSIS: 0
CHILLS: 0
BRUISES/BLEEDS EASILY: 0
COUGH: 0
WEIGHT GAIN: 0
FEVER: 0
HEMATOCHEZIA: 0
SUSPICIOUS LESIONS: 0

## 2020-10-14 ASSESSMENT — PATIENT HEALTH QUESTIONNAIRE - PHQ9
1. LITTLE INTEREST OR PLEASURE IN DOING THINGS: NOT AT ALL
2. FEELING DOWN, DEPRESSED OR HOPELESS: NOT AT ALL
CLINICAL INTERPRETATION OF PHQ9 SCORE: NO FURTHER SCREENING NEEDED
SUM OF ALL RESPONSES TO PHQ9 QUESTIONS 1 AND 2: 0
SUM OF ALL RESPONSES TO PHQ9 QUESTIONS 1 AND 2: 0
CLINICAL INTERPRETATION OF PHQ2 SCORE: NO FURTHER SCREENING NEEDED

## 2020-10-16 ENCOUNTER — OFFICE VISIT (OUTPATIENT)
Dept: FAMILY MEDICINE CLINIC | Facility: CLINIC | Age: 75
End: 2020-10-16
Payer: COMMERCIAL

## 2020-10-16 VITALS
HEART RATE: 57 BPM | RESPIRATION RATE: 12 BRPM | BODY MASS INDEX: 33.27 KG/M2 | DIASTOLIC BLOOD PRESSURE: 64 MMHG | HEIGHT: 66 IN | WEIGHT: 207 LBS | OXYGEN SATURATION: 98 % | SYSTOLIC BLOOD PRESSURE: 144 MMHG | TEMPERATURE: 99 F

## 2020-10-16 DIAGNOSIS — Z20.822 CLOSE EXPOSURE TO COVID-19 VIRUS: Primary | ICD-10-CM

## 2020-10-16 PROCEDURE — 3077F SYST BP >= 140 MM HG: CPT | Performed by: NURSE PRACTITIONER

## 2020-10-16 PROCEDURE — 99213 OFFICE O/P EST LOW 20 MIN: CPT | Performed by: NURSE PRACTITIONER

## 2020-10-16 PROCEDURE — 3008F BODY MASS INDEX DOCD: CPT | Performed by: NURSE PRACTITIONER

## 2020-10-16 PROCEDURE — 3078F DIAST BP <80 MM HG: CPT | Performed by: NURSE PRACTITIONER

## 2020-10-16 RX ORDER — POTASSIUM CHLORIDE 750 MG/1
TABLET, EXTENDED RELEASE ORAL
Qty: 180 TABLET | Refills: 3 | Status: SHIPPED | OUTPATIENT
Start: 2020-10-16 | End: 2020-10-16

## 2020-10-16 NOTE — PATIENT INSTRUCTIONS
[employer] Video Sheets  [facility]  The COVID-19 virus emerged in [place] in 2019. Since then, it spread around the world. But how is this possible? Let's learn about how this virus spreads.   To watch the video:  Scan the QR code  Using your mob

## 2020-10-16 NOTE — PROGRESS NOTES
CHIEF COMPLAINT:   No chief complaint on file. HPI:   Kristina Hargrove is a 67year old male who presents for a covid test d/t cousin testing positive today for covid. Pt is asymptomatic.      Current Outpatient Medications   Medication Sig Dispense Refill • Other and unspecified hyperlipidemia    • Type II or unspecified type diabetes mellitus without mention of complication, not stated as uncontrolled    • Unspecified essential hypertension       History reviewed. No pertinent surgical history.       Social Risks, benefits, and side effects of medication explained and discussed. The patient indicates understanding of these issues and agrees to the plan. The patient is asked to f/u with PCP if sx's persist or worsen.   Patient Instructions       Carmelita Vásquez

## 2020-10-16 NOTE — TELEPHONE ENCOUNTER
Name from pharmacy: [facility] [address]          Will file in chart as: POTASSIUM CHLORIDE ER 10 MEQ Oral Tab CR    Sig: TAKE 1 TABLET BY MOUTH  TWICE DAILY    Disp:  180 tablet    Refills:  3    Start: 10/16/2020

## 2020-10-20 ENCOUNTER — TELEMEDICINE (OUTPATIENT)
Dept: FAMILY MEDICINE CLINIC | Facility: CLINIC | Age: 75
End: 2020-10-20
Payer: COMMERCIAL

## 2020-10-20 DIAGNOSIS — Z20.822 EXPOSURE TO COVID-19 VIRUS: Primary | ICD-10-CM

## 2020-10-20 PROCEDURE — 99213 OFFICE O/P EST LOW 20 MIN: CPT | Performed by: FAMILY MEDICINE

## 2020-10-20 NOTE — PATIENT INSTRUCTIONS
Coronavirus Disease 2019 (COVID-19): Overview  Coronavirus disease 2019 (COVID-19) is a respiratory illness. It's caused by a new (novel) coronavirus. There are many types of coronavirus. Coronaviruses are a very common cause of colds and bronchitis.  The You can check your symptoms with the CDC’s Coronavirus Self-. What are possible complications from OPNHP-27? In many cases, this virus can cause infection (pneumonia) in both lungs. In some cases, this can cause death.  Certain people are at highe Your healthcare provider will ask about your symptoms. He or she will ask where you live, and about your recent travel, and any contact with sick people.  If your healthcare provider thinks you may have COVID-19, he or she will consider whether to test you The most proven treatments right now are those to help your body while it fights the virus. This is known as supportive care. Supportive care may include:   · Getting rest.  This helps your body fight the illness. · Staying hydrated.   Drinking liquids is People who have had COVID-19 and are fully recovered may be asked by their healthcare team to consider donating plasma. This is called COVID-19 convalescent plasma donation.  Plasma from people fully recovered from COVID-19 may contain antibodies to help fi

## 2020-10-20 NOTE — PROGRESS NOTES
Subjective    This visit is conducted using Telemedicine with live, interactive video and audio. The patient confirmed knowledge of the limitations of the use of telemedicine were verbally confirmed by the provider.   Verification of patient identity w surgical history.    Family History   Problem Relation Age of Onset   • Heart Disease Neg    • Diabetes Neg       Social History    Tobacco Use      Smoking status: Never Smoker      Smokeless tobacco: Never Used    Alcohol use: No      Alcohol/week: 0.0 st outlined on CDC Patient Guidelines. Patient understands phone evaluation is not a substitute for face-to-face examination or emergency care. Patient advised to go to ER or call 911 for worsening symptoms or acute distress.            Virtual Check-In    Pat

## 2020-11-02 ENCOUNTER — TELEPHONE (OUTPATIENT)
Dept: CARDIOLOGY | Age: 75
End: 2020-11-02

## 2020-11-02 DIAGNOSIS — E78.00 HYPERCHOLESTEROLEMIA: Primary | ICD-10-CM

## 2020-11-03 ENCOUNTER — HOSPITAL ENCOUNTER (OUTPATIENT)
Dept: CV DIAGNOSTICS | Age: 75
Discharge: HOME | End: 2020-11-03
Attending: INTERNAL MEDICINE
Payer: MEDICARE

## 2020-11-03 DIAGNOSIS — I25.10 CORONARY ARTERY DISEASE INVOLVING NATIVE HEART WITHOUT ANGINA PECTORIS, UNSPECIFIED VESSEL OR LESION TYPE: ICD-10-CM

## 2020-11-03 DIAGNOSIS — I10 HYPERTENSION, ESSENTIAL: ICD-10-CM

## 2020-11-03 DIAGNOSIS — I27.20 PULMONARY HTN (HCC): ICD-10-CM

## 2020-11-03 PROCEDURE — 93306 TTE W/DOPPLER COMPLETE: CPT | Performed by: INTERNAL MEDICINE

## 2020-11-04 ENCOUNTER — E-ADVICE (OUTPATIENT)
Dept: CARDIOLOGY | Age: 75
End: 2020-11-04

## 2020-11-04 DIAGNOSIS — I25.10 CORONARY ARTERY DISEASE INVOLVING NATIVE HEART WITHOUT ANGINA PECTORIS, UNSPECIFIED VESSEL OR LESION TYPE: ICD-10-CM

## 2020-11-04 DIAGNOSIS — I10 ESSENTIAL HYPERTENSION: Primary | ICD-10-CM

## 2020-11-04 DIAGNOSIS — I27.20 PULMONARY HTN (CMD): ICD-10-CM

## 2021-02-01 DIAGNOSIS — Z23 NEED FOR VACCINATION: ICD-10-CM

## 2021-02-03 ENCOUNTER — IMMUNIZATION (OUTPATIENT)
Dept: LAB | Age: 76
End: 2021-02-03
Attending: HOSPITALIST
Payer: MEDICARE

## 2021-02-03 DIAGNOSIS — Z23 NEED FOR VACCINATION: Primary | ICD-10-CM

## 2021-02-03 PROCEDURE — 0001A SARSCOV2 VAC 30MCG/0.3ML IM: CPT

## 2021-02-19 ENCOUNTER — TELEPHONE (OUTPATIENT)
Dept: FAMILY MEDICINE CLINIC | Facility: CLINIC | Age: 76
End: 2021-02-19

## 2021-02-19 DIAGNOSIS — M54.50 LOW BACK PAIN, UNSPECIFIED BACK PAIN LATERALITY, UNSPECIFIED CHRONICITY, UNSPECIFIED WHETHER SCIATICA PRESENT: Primary | ICD-10-CM

## 2021-02-19 NOTE — TELEPHONE ENCOUNTER
Patient states he needs a referral for [facility] in [place] on [facility],   Phone [phone]  Fax [phone]  doesn't have an appt yet, doctor is aware, please advise.

## 2021-02-24 ENCOUNTER — IMMUNIZATION (OUTPATIENT)
Dept: LAB | Age: 76
End: 2021-02-24
Attending: HOSPITALIST
Payer: MEDICARE

## 2021-02-24 DIAGNOSIS — Z23 NEED FOR VACCINATION: Primary | ICD-10-CM

## 2021-02-24 PROCEDURE — 0002A SARSCOV2 VAC 30MCG/0.3ML IM: CPT

## 2021-03-08 RX ORDER — IRBESARTAN 150 MG/1
TABLET ORAL
Qty: 90 TABLET | Refills: 1 | Status: SHIPPED | OUTPATIENT
Start: 2021-03-08 | End: 2021-08-16

## 2021-03-08 NOTE — TELEPHONE ENCOUNTER
Hypertension Medications Protocol [id number]/07/2021 04:43 AM   Appointment in past 6 or next 3 months Protocol Details    CMP or BMP in past 12 months     Last serum creatinine< 2.0      Refill protocol failed because the patient did not meet the protocol c

## 2021-03-10 DIAGNOSIS — Z23 NEED FOR VACCINATION: ICD-10-CM

## 2021-03-18 ENCOUNTER — TELEPHONE (OUTPATIENT)
Dept: FAMILY MEDICINE CLINIC | Facility: CLINIC | Age: 76
End: 2021-03-18

## 2021-03-18 NOTE — TELEPHONE ENCOUNTER
Patient calling, complains of dry cough and will also like to discuss urinary issues. Patient will like in-person visit, received covid vaccine.  Please advise

## 2021-03-19 NOTE — TELEPHONE ENCOUNTER
Future Appointments   Date Time Provider Agatha Samira   3/22/2021  5:15 PM Pollo Henry MD [facility] [address]

## 2021-03-22 ENCOUNTER — TELEMEDICINE (OUTPATIENT)
Dept: FAMILY MEDICINE CLINIC | Facility: CLINIC | Age: 76
End: 2021-03-22

## 2021-03-22 ENCOUNTER — TELEPHONE (OUTPATIENT)
Dept: FAMILY MEDICINE CLINIC | Facility: CLINIC | Age: 76
End: 2021-03-22

## 2021-03-22 DIAGNOSIS — R05.9 COUGH: ICD-10-CM

## 2021-03-22 DIAGNOSIS — N30.00 ACUTE CYSTITIS WITHOUT HEMATURIA: Primary | ICD-10-CM

## 2021-03-22 PROCEDURE — 99213 OFFICE O/P EST LOW 20 MIN: CPT | Performed by: FAMILY MEDICINE

## 2021-03-22 RX ORDER — NITROFURANTOIN 25; 75 MG/1; MG/1
100 CAPSULE ORAL 2 TIMES DAILY
Qty: 14 CAPSULE | Refills: 0 | Status: SHIPPED | OUTPATIENT
Start: 2021-03-22 | End: 2021-03-29

## 2021-03-22 NOTE — TELEPHONE ENCOUNTER
Future Appointments   Date Time Provider Agatha Samira   3/22/2021  5:15 PM Laura Boyd MD [facility] [address]

## 2021-03-22 NOTE — PROGRESS NOTES
TELEMEDICINE VISIT by phone  This visit is conducted using Telemedicine with live, interactive video    Verification of patient identity was established by the  patient (s)  Leonela Zavaleta verbally consents to a telemedicine v Oral Cap, Take 1 capsule (75 mg total) by mouth daily. , Disp: 90 capsule, Rfl: 1  •  tamsulosin (FLOMAX) cap, TAKE 2 CAPSULES BY MOUTH  DAILY, Disp: 180 capsule, Rfl: 3  •  Aspirin Buf,CaCarb-MgCarb-MgO, 81 MG Oral Tab, daily, Disp: , Rfl:   •  FUROSEMIDE 10/16/20: 5' 6\" (1.676 m). Weight as of 10/16/20: 207 lb (93.9 kg). No Vital Signs due to telemedicine visit  Physical Exam:  GEN:  Patient is alert, awake and oriented, no apparent distress. ASSESSMENT AND PLAN:      1.  Acute cystitis without h

## 2021-03-22 NOTE — TELEPHONE ENCOUNTER
Future Appointments   Date Time Provider Agatha Barahonai   3/22/2021  5:15 PM Manda Montiel MD [facility] [address]     UTI is bothering him and would like to talk to Dr before appt today

## 2021-04-05 RX ORDER — PREGABALIN 75 MG/1
75 CAPSULE ORAL DAILY
Qty: 90 CAPSULE | Refills: 1 | Status: SHIPPED | OUTPATIENT
Start: 2021-04-05 | End: 2021-09-23

## 2021-04-05 NOTE — TELEPHONE ENCOUNTER
Disp Refills Start End    pregabalin (LYRICA) 75 MG Oral Cap 90 capsule 1 9/3/2020       Video visit 3/22/21 for cystitis  No future appointments. Please advise.  Thank you

## 2021-04-21 ENCOUNTER — TELEPHONE (OUTPATIENT)
Dept: FAMILY MEDICINE CLINIC | Facility: CLINIC | Age: 76
End: 2021-04-21

## 2021-04-21 NOTE — TELEPHONE ENCOUNTER
Future Appointments   Date Time Provider Agatha Hogan   4/28/2021  1:30 PM Kenny Hobson MD [facility] [address]     · Pt is asking for lab orders to be put in prior to appt to complete. · Pt would like a call when orders are in the system.

## 2021-04-21 NOTE — TELEPHONE ENCOUNTER
See phone message below:    Pt requesting lab orders prior to upcoming appt on 4/28/21. Pt had annual labs done on 10/14/2020. Due for repeat a1c. Do you want him to repeat any other labs prior to appt?

## 2021-04-28 ENCOUNTER — OFFICE VISIT (OUTPATIENT)
Dept: FAMILY MEDICINE CLINIC | Facility: CLINIC | Age: 76
End: 2021-04-28
Payer: COMMERCIAL

## 2021-04-28 VITALS
HEIGHT: 66 IN | RESPIRATION RATE: 16 BRPM | BODY MASS INDEX: 32.3 KG/M2 | DIASTOLIC BLOOD PRESSURE: 78 MMHG | WEIGHT: 201 LBS | HEART RATE: 42 BPM | TEMPERATURE: 98 F | SYSTOLIC BLOOD PRESSURE: 122 MMHG

## 2021-04-28 DIAGNOSIS — E11.65 TYPE 2 DIABETES MELLITUS, UNCONTROLLED, WITH NEUROPATHY (HCC): ICD-10-CM

## 2021-04-28 DIAGNOSIS — R19.02 LEFT UPPER QUADRANT ABDOMINAL MASS: ICD-10-CM

## 2021-04-28 DIAGNOSIS — E11.40 TYPE 2 DIABETES MELLITUS, UNCONTROLLED, WITH NEUROPATHY (HCC): ICD-10-CM

## 2021-04-28 DIAGNOSIS — I27.20 PULMONARY HTN (HCC): ICD-10-CM

## 2021-04-28 DIAGNOSIS — Z00.00 ROUTINE ADULT HEALTH MAINTENANCE: Primary | ICD-10-CM

## 2021-04-28 PROCEDURE — 99397 PER PM REEVAL EST PAT 65+ YR: CPT | Performed by: FAMILY MEDICINE

## 2021-04-28 PROCEDURE — 3008F BODY MASS INDEX DOCD: CPT | Performed by: FAMILY MEDICINE

## 2021-04-28 PROCEDURE — 3074F SYST BP LT 130 MM HG: CPT | Performed by: FAMILY MEDICINE

## 2021-04-28 PROCEDURE — 82947 ASSAY GLUCOSE BLOOD QUANT: CPT | Performed by: FAMILY MEDICINE

## 2021-04-28 PROCEDURE — 96160 PT-FOCUSED HLTH RISK ASSMT: CPT | Performed by: FAMILY MEDICINE

## 2021-04-28 PROCEDURE — G0439 PPPS, SUBSEQ VISIT: HCPCS | Performed by: FAMILY MEDICINE

## 2021-04-28 PROCEDURE — 3078F DIAST BP <80 MM HG: CPT | Performed by: FAMILY MEDICINE

## 2021-04-28 RX ORDER — BLOOD-GLUCOSE METER
1 EACH MISCELLANEOUS
COMMUNITY
Start: 2021-03-15 | End: 2021-10-27

## 2021-04-28 RX ORDER — CHOLECALCIFEROL (VITAMIN D3) 1250 MCG
CAPSULE ORAL
COMMUNITY
Start: 2020-11-25 | End: 2021-07-09

## 2021-04-28 RX ORDER — LANCETS
EACH MISCELLANEOUS
COMMUNITY
Start: 2021-04-06

## 2021-04-28 RX ORDER — LANCETS
EACH MISCELLANEOUS
COMMUNITY
Start: 2021-04-07 | End: 2021-07-09

## 2021-04-28 RX ORDER — BLOOD-GLUCOSE METER
1 EACH MISCELLANEOUS
COMMUNITY
Start: 2021-04-06

## 2021-04-28 RX ORDER — LANCING DEVICE/LANCETS
KIT MISCELLANEOUS
COMMUNITY
Start: 2021-04-07

## 2021-04-28 RX ORDER — NIACIN 500 MG
500 TABLET ORAL DAILY
COMMUNITY

## 2021-04-28 RX ORDER — BLOOD-GLUCOSE METER
EACH MISCELLANEOUS
COMMUNITY
Start: 2021-03-25 | End: 2021-07-09

## 2021-04-28 RX ORDER — ACETAMINOPHEN AND CODEINE PHOSPHATE 300; 30 MG/1; MG/1
TABLET ORAL
COMMUNITY
Start: 2020-09-04 | End: 2021-04-28 | Stop reason: ALTCHOICE

## 2021-04-29 PROBLEM — D69.6 THROMBOCYTOPENIA: Chronic | Status: ACTIVE | Noted: 2021-04-29

## 2021-04-29 PROBLEM — D69.6 THROMBOCYTOPENIA (HCC): Chronic | Status: ACTIVE | Noted: 2021-04-29

## 2021-04-29 PROBLEM — I27.20 PULMONARY HTN (HCC): Status: ACTIVE | Noted: 2021-04-29

## 2021-04-29 NOTE — PROGRESS NOTES
HPI:   Tanisha Hammond is a 67year old male who presents for a MA (Medicare Advantage) Supervisit (Once per calendar year). Patient presents with:  Wellness Visit: Gaviota Cole, 03/15/2021 Dr. Laureano Head @ [facility].  A1c 7%, not sure for 3 months  Diabetes: neuropathy ([facility]     Obesity with serious comorbidity     Pulmonary HTN (HCC)     Thrombocytopenia ([facility]    Wt Readings from Last 3 Encounters:  04/28/21 : 201 lb (91.2 kg)  10/16/20 : 207 lb (93.9 kg)  07/27/20 : 200 lb (90.7 kg)     Last Cholesterol Labs by mouth. Olmesartan Medoxomil 40 MG Oral Tab, Take 40 mg by mouth.   JARDIANCE 25 MG Oral Tab,   Vitamin D, Ergocalciferol, (DRISDOL) 24837 UNITS Oral Cap,   Ferrous Sulfate 325 (65 FE) MG Oral Tab,   LANTUS SOLOSTAR 100 UNIT/ML Subcutaneous Solution Pen- calculated from the following:    Height as of this encounter: 5' 6\" (1.676 m). Weight as of this encounter: 201 lb (91.2 kg).     Medicare Hearing Assessment  (Required for AWV/SWV)    Whisper test negative bilaterally              Visual Acuity Dose 65 YRS & Older PRSV Free (16967) 10/03/2019   • FLUAD High Dose 65 yr and older (23755) 10/20/2017, 10/16/2018   • FLUZONE 6 months and older PFS 0.5 ml (05018) 10/19/2015   • Fluvirin, 3 Years & >, Im 09/21/2009, 10/30/2013   • Fluzone Vaccine [facility]     In the past six months, have you lost more than 10 pounds without trying?: 2 - No  Has your appetite been poor?: No  How does the patient maintain a good energy level?: Appropriate Exercise  How would you describe your daily physical Pneumococcal 23 (Pneumovax)  Covered Once after 65 No vaccine history found Please get once after your 65th birthday    Hepatitis B for Moderate/High Risk No vaccine history found Medium/high risk factors:   End-stage renal disease   Hemophiliacs who recei

## 2021-05-06 ENCOUNTER — OFFICE VISIT (OUTPATIENT)
Dept: SURGERY | Facility: CLINIC | Age: 76
End: 2021-05-06
Payer: COMMERCIAL

## 2021-05-06 VITALS — TEMPERATURE: 97 F | HEIGHT: 66 IN | WEIGHT: 201 LBS | BODY MASS INDEX: 32.3 KG/M2

## 2021-05-06 DIAGNOSIS — R22.9 SKIN MASS: Primary | ICD-10-CM

## 2021-05-06 DIAGNOSIS — D22.9 NEVUS: ICD-10-CM

## 2021-05-06 PROCEDURE — 99203 OFFICE O/P NEW LOW 30 MIN: CPT | Performed by: SURGERY

## 2021-05-06 PROCEDURE — 3008F BODY MASS INDEX DOCD: CPT | Performed by: SURGERY

## 2021-05-06 NOTE — H&P
New Patient Visit Note       Active Problems      1. Skin mass    2. Nevus        Chief Complaint   Patient presents with:  Mass: New patient referred by Dr. Dmitry Rodriguez for mass on abdomen. c/o mass the size of a golf ball. Denies any pain or drainage.        H as uncontrolled    • Unspecified essential hypertension      History reviewed. No pertinent surgical history. The family history and social history have been reviewed by me today.     Family History   Problem Relation Age of Onset   • Heart Disease Neg tablet, Rfl: 1  •  Potassium Chloride ER 10 MEQ Oral Tab CR, Take 1 tablet (10 mEq total) by mouth 2 (two) times daily. , Disp: 180 tablet, Rfl: 0  •  Levothyroxine Sodium 75 MCG Oral Tab, , Disp: , Rfl:   •  Continuous Blood Gluc Sensor (DEXCOM G6 SENSOR) Negative for color change and rash. Neurological: Negative for tremors, syncope and weakness. Hematological: Negative for adenopathy. Does not bruise/bleed easily. Psychiatric/Behavioral: Negative for behavioral problems and sleep disturbance. and alternatives to the procedure were explained to the patient. The risks explained include, but are not limited to, bleeding, infection, pain wound complications, recurrence, incorrect diagnosis, injury to adjacent organs and structures.  We also discuss

## 2021-05-07 ENCOUNTER — LAB ENCOUNTER (OUTPATIENT)
Dept: LAB | Age: 76
End: 2021-05-07
Attending: SURGERY
Payer: MEDICARE

## 2021-05-07 ENCOUNTER — TELEPHONE (OUTPATIENT)
Dept: FAMILY MEDICINE CLINIC | Facility: CLINIC | Age: 76
End: 2021-05-07

## 2021-05-07 PROCEDURE — 36415 COLL VENOUS BLD VENIPUNCTURE: CPT | Performed by: SURGERY

## 2021-05-07 PROCEDURE — 93005 ELECTROCARDIOGRAM TRACING: CPT

## 2021-05-07 PROCEDURE — 80053 COMPREHEN METABOLIC PANEL: CPT | Performed by: SURGERY

## 2021-05-07 NOTE — TELEPHONE ENCOUNTER
Pt states that he read the office visit notes from his last visit and states that there was some information that was not accurate.

## 2021-05-07 NOTE — TELEPHONE ENCOUNTER
Pt calling after reviewing his office note on MyChart and noting several inaccuracies that very unhappy about.    -Pt does have a Power of  for [employer] and a Living Will on file. Pt has provided a copy of both documents to this office.  Note al the eye exam gets updated, pt will have eye doctor send report.

## 2021-05-07 NOTE — TELEPHONE ENCOUNTER
See TE and read thoroughly , pt called to discuss several inaccuracies documented in OV note and is quite upset about them and would like them to be corrected.

## 2021-05-10 ENCOUNTER — HOSPITAL ENCOUNTER (OUTPATIENT)
Dept: CV DIAGNOSTICS | Age: 76
Discharge: HOME | End: 2021-05-10
Attending: INTERNAL MEDICINE
Payer: MEDICARE

## 2021-05-10 DIAGNOSIS — I10 HYPERTENSION, ESSENTIAL: ICD-10-CM

## 2021-05-10 DIAGNOSIS — I27.20 PULMONARY HTN (HCC): ICD-10-CM

## 2021-05-10 DIAGNOSIS — I25.10 CORONARY ARTERY DISEASE INVOLVING NATIVE HEART WITHOUT ANGINA PECTORIS, UNSPECIFIED VESSEL OR LESION TYPE: ICD-10-CM

## 2021-05-10 PROCEDURE — 93306 TTE W/DOPPLER COMPLETE: CPT | Performed by: INTERNAL MEDICINE

## 2021-05-19 ENCOUNTER — OFFICE VISIT (OUTPATIENT)
Dept: SURGERY | Facility: CLINIC | Age: 76
End: 2021-05-19
Payer: COMMERCIAL

## 2021-05-19 VITALS
TEMPERATURE: 98 F | DIASTOLIC BLOOD PRESSURE: 83 MMHG | WEIGHT: 201 LBS | BODY MASS INDEX: 32.3 KG/M2 | HEIGHT: 66 IN | HEART RATE: 93 BPM | SYSTOLIC BLOOD PRESSURE: 156 MMHG

## 2021-05-19 DIAGNOSIS — D22.9 NEVUS: ICD-10-CM

## 2021-05-19 DIAGNOSIS — R22.9 SKIN MASS: Primary | ICD-10-CM

## 2021-05-19 PROCEDURE — 99212 OFFICE O/P EST SF 10 MIN: CPT | Performed by: SURGERY

## 2021-05-19 PROCEDURE — 3008F BODY MASS INDEX DOCD: CPT | Performed by: SURGERY

## 2021-05-19 PROCEDURE — 3077F SYST BP >= 140 MM HG: CPT | Performed by: SURGERY

## 2021-05-19 PROCEDURE — 3079F DIAST BP 80-89 MM HG: CPT | Performed by: SURGERY

## 2021-05-19 NOTE — PROGRESS NOTES
Follow Up Visit Note       Active Problems      1. Skin mass    2.  Nevus          Chief Complaint   Patient presents with:  Mass: PRE OP - 7/1 exc. abdominal wall -- Pt scheduled for surgery 7/1/2021 and wanted Dr. Adrienne Arroyo to speak with his nephew who is Heart Disease Neg    • Diabetes Neg    • High Blood Pressure Neg      Social History    Socioeconomic History      Marital status:       Spouse name: Not on file      Number of children: Not on file      Years of education: Not on file      Highest Continuous Blood Gluc Sensor (DEXCOM G6 SENSOR) Does not apply Misc, 3 each by Does not apply route., Disp: , Rfl:   •  atorvastatin 10 MG Oral Tab, Take 10 mg by mouth., Disp: , Rfl:   •  Olmesartan Medoxomil 40 MG Oral Tab, Take 40 mg by mouth., Disp: , I will honor this request and proceed as the patient deems most appropriate for him. · I will await further direction from the patient regarding his decision to proceed with surgery.   ·   · The care plan was discussed with the patient and his son in per

## 2021-05-19 NOTE — PROGRESS NOTES
Follow Up Visit Note       Active Problems      No diagnosis found.       Chief Complaint   Patient presents with:  Mass: PRE OP - 7/1 exc. abdominal wall --         History of Present Illness        Allergies  Irfan is allergic to bromocriptine, clindamyci Does not apply route., Disp: , Rfl:   •  Accu-Chek FastClix Lancets Does not apply Misc, , Disp: , Rfl:   •  Lancets Misc. (ACCU-CHEK FASTCLIX LANCET) Does not apply Kit, , Disp: , Rfl:   •  niacin 500 MG Oral Tab, Take 500 mg by mouth daily. , Disp: , Rfl: Negative for chills, diaphoresis, fatigue, fever and unexpected weight change. HENT: Negative for hearing loss, nosebleeds, sore throat and trouble swallowing. Respiratory: Negative for apnea, cough, shortness of breath and wheezing.     Cardiovascular

## 2021-05-27 ENCOUNTER — TELEPHONE (OUTPATIENT)
Dept: SURGERY | Facility: CLINIC | Age: 76
End: 2021-05-27

## 2021-06-03 ENCOUNTER — TELEPHONE (OUTPATIENT)
Dept: FAMILY MEDICINE CLINIC | Facility: CLINIC | Age: 76
End: 2021-06-03

## 2021-06-03 NOTE — TELEPHONE ENCOUNTER
Received via fax the diabetic eye exam report from Dr. Donnamarie Koyanagi. Date of exam was 5/19/21, showed no diabetic retinopathy. DM flow sheet updated & the report was placed in Dr. Bev Amezcua in box for review.

## 2021-06-04 ENCOUNTER — TELEPHONE (OUTPATIENT)
Dept: FAMILY MEDICINE CLINIC | Facility: CLINIC | Age: 76
End: 2021-06-04

## 2021-06-04 NOTE — TELEPHONE ENCOUNTER
Paperwork to appoint power of  for health care given to scan in.     Health care agent:  Andra Durand  #[phone]    If not available:  Bill Davis  #[phone]

## 2021-07-09 ENCOUNTER — OFFICE VISIT (OUTPATIENT)
Dept: FAMILY MEDICINE CLINIC | Facility: CLINIC | Age: 76
End: 2021-07-09
Payer: COMMERCIAL

## 2021-07-09 VITALS
DIASTOLIC BLOOD PRESSURE: 72 MMHG | HEIGHT: 66 IN | OXYGEN SATURATION: 96 % | BODY MASS INDEX: 33.11 KG/M2 | WEIGHT: 206 LBS | TEMPERATURE: 97 F | RESPIRATION RATE: 16 BRPM | HEART RATE: 60 BPM | SYSTOLIC BLOOD PRESSURE: 130 MMHG

## 2021-07-09 DIAGNOSIS — I10 ESSENTIAL HYPERTENSION, BENIGN: Primary | ICD-10-CM

## 2021-07-09 DIAGNOSIS — H92.02 LEFT EAR PAIN: ICD-10-CM

## 2021-07-09 PROCEDURE — 3008F BODY MASS INDEX DOCD: CPT | Performed by: FAMILY MEDICINE

## 2021-07-09 PROCEDURE — 99214 OFFICE O/P EST MOD 30 MIN: CPT | Performed by: FAMILY MEDICINE

## 2021-07-09 PROCEDURE — 3075F SYST BP GE 130 - 139MM HG: CPT | Performed by: FAMILY MEDICINE

## 2021-07-09 PROCEDURE — 3078F DIAST BP <80 MM HG: CPT | Performed by: FAMILY MEDICINE

## 2021-07-09 RX ORDER — CEPHALEXIN 500 MG/1
500 CAPSULE ORAL 2 TIMES DAILY
Qty: 14 CAPSULE | Refills: 0 | Status: SHIPPED | OUTPATIENT
Start: 2021-07-09 | End: 2021-07-16

## 2021-07-09 RX ORDER — CHOLECALCIFEROL (VITAMIN D3) 1250 MCG
1 CAPSULE ORAL WEEKLY
COMMUNITY

## 2021-07-09 RX ORDER — ASPIRIN 81 MG/1
81 TABLET ORAL DAILY
COMMUNITY

## 2021-07-11 PROBLEM — H92.02 LEFT EAR PAIN: Status: ACTIVE | Noted: 2021-07-11

## 2021-07-12 NOTE — PROGRESS NOTES
HPI:   Jose Adamson is a 67year old male that presents for patient is here for follow-up of hypertension as well as complaint of having left ear pain over the past several days or more.   He did use a Q-tip to try to clean any wax out of the ear and then h mg by mouth daily. , Disp: , Rfl:   •  pregabalin (LYRICA) 75 MG Oral Cap, Take 1 capsule (75 mg total) by mouth daily. , Disp: 90 capsule, Rfl: 1  •  IRBESARTAN 150 MG Oral Tab, TAKE 1 TABLET BY MOUTH  DAILY, Disp: 90 tablet, Rfl: 1  •  ATENOLOL 25 MG Oral encounter: 206 lb (93.4 kg). Vital signs reviewed. Appears stated age, well groomed. Physical Exam:  GEN:  patient is alert, awake and oriented, well developed, well nourished, no apparent distress.   HEENT:  Head:  normocephalic, atraumatic        Eyes: Previously he had been taking 25 mg and had some side effects from it. I think he should stay on half a tablet per day at this time. Continue with irbesartan 150 mg daily.     Risks, benefits, and alternatives of current treatment plan discussed in detail

## 2021-07-19 ENCOUNTER — TELEPHONE (OUTPATIENT)
Dept: FAMILY MEDICINE CLINIC | Facility: CLINIC | Age: 76
End: 2021-07-19

## 2021-07-19 DIAGNOSIS — M54.50 LOW BACK PAIN, UNSPECIFIED BACK PAIN LATERALITY, UNSPECIFIED CHRONICITY, UNSPECIFIED WHETHER SCIATICA PRESENT: Primary | ICD-10-CM

## 2021-07-19 NOTE — TELEPHONE ENCOUNTER
Patient calling, requesting new referral for PT and additional visits. Previous referral done in February for [facility].  Please advise

## 2021-07-23 NOTE — TELEPHONE ENCOUNTER
Patient states he thought he replied to this message, calling to f/up on referral, it is for lower back pain, please advise.

## 2021-07-26 ENCOUNTER — OFFICE VISIT (OUTPATIENT)
Dept: FAMILY MEDICINE CLINIC | Facility: CLINIC | Age: 76
End: 2021-07-26
Payer: COMMERCIAL

## 2021-07-26 VITALS
SYSTOLIC BLOOD PRESSURE: 161 MMHG | HEART RATE: 59 BPM | TEMPERATURE: 98 F | OXYGEN SATURATION: 97 % | DIASTOLIC BLOOD PRESSURE: 53 MMHG

## 2021-07-26 DIAGNOSIS — B35.6 TINEA CRURIS: Primary | ICD-10-CM

## 2021-07-26 PROCEDURE — 3078F DIAST BP <80 MM HG: CPT | Performed by: FAMILY MEDICINE

## 2021-07-26 PROCEDURE — 99213 OFFICE O/P EST LOW 20 MIN: CPT | Performed by: FAMILY MEDICINE

## 2021-07-26 PROCEDURE — 3077F SYST BP >= 140 MM HG: CPT | Performed by: FAMILY MEDICINE

## 2021-07-26 NOTE — PROGRESS NOTES
CHIEF COMPLAINT:   Patient presents with:  Rash         HPI:    Tanisha Hammond is a 67year old male who presents for evaluation of a rash. Per patient rash started in the past 7 days. Rash has been worsening since onset.   Patient has not had similar rash i tablet (10 mEq total) by mouth 2 (two) times daily. , Disp: 180 tablet, Rfl: 0  Levothyroxine Sodium 75 MCG Oral Tab, Take 75 mcg by mouth before breakfast.  , Disp: , Rfl:   atorvastatin 10 MG Oral Tab, Take 10 mg by mouth daily.   , Disp: , Rfl:   Tamiko Ritter throat. CARDIOVASCULAR: Denies chest pains or palpitations. LUNGS: Denies shortness of breath with exertion or rest. No cough or wheezing. LYMPH: Denies enlargement of the lymph nodes.   NEURO: Denies abnormal sensation, tingling of the skin, or numbness further evaluation. The patient indicates understanding of these issues and agrees to the plan.   The patient is asked to return in 3 days if sx persist or worsen

## 2021-07-26 NOTE — PATIENT INSTRUCTIONS
Apply cream (clobetasol/betamethasone) cream to affected area twice daily to affected areas. Use until symptoms resolve (usually 1-2 weeks)  Try to keep the area clean and dry to help reduce itching and the spread of the rash.    If no better in 1 week or i

## 2021-07-27 ENCOUNTER — TELEPHONE (OUTPATIENT)
Dept: FAMILY MEDICINE CLINIC | Facility: CLINIC | Age: 76
End: 2021-07-27

## 2021-07-27 NOTE — TELEPHONE ENCOUNTER
Jacqueline Aiken- Pt has a new fax number to send PT order. Fax [phone]  [facility].     Sent today/ts

## 2021-07-30 NOTE — TELEPHONE ENCOUNTER
Contacted [facility] to confirm fax number, the fax number has changed. New fax number is [phone].

## 2021-07-30 NOTE — TELEPHONE ENCOUNTER
Referral faxed to [facility] using fax number provided by pt, [phone], confirmation received.

## 2021-08-09 ENCOUNTER — TELEPHONE (OUTPATIENT)
Dept: FAMILY MEDICINE CLINIC | Facility: CLINIC | Age: 76
End: 2021-08-09

## 2021-08-09 NOTE — TELEPHONE ENCOUNTER
Pt states he was feeling lightheaded today so he checked his B/P which was 180/76 this morning, pt states last night his B/P was 178/80? Marta Master  Pt states he is concerned as his B/P is \"never\" this high and he is a diabetic and has CAD, pt requesting to see

## 2021-08-11 ENCOUNTER — OFFICE VISIT (OUTPATIENT)
Dept: FAMILY MEDICINE CLINIC | Facility: CLINIC | Age: 76
End: 2021-08-11
Payer: COMMERCIAL

## 2021-08-11 VITALS
TEMPERATURE: 97 F | DIASTOLIC BLOOD PRESSURE: 62 MMHG | RESPIRATION RATE: 16 BRPM | SYSTOLIC BLOOD PRESSURE: 120 MMHG | HEIGHT: 66 IN | HEART RATE: 63 BPM | WEIGHT: 204.5 LBS | OXYGEN SATURATION: 99 % | BODY MASS INDEX: 32.87 KG/M2

## 2021-08-11 DIAGNOSIS — E78.2 MIXED HYPERLIPIDEMIA: ICD-10-CM

## 2021-08-11 DIAGNOSIS — I10 ESSENTIAL HYPERTENSION, BENIGN: Primary | ICD-10-CM

## 2021-08-11 PROCEDURE — 3008F BODY MASS INDEX DOCD: CPT | Performed by: FAMILY MEDICINE

## 2021-08-11 PROCEDURE — 3078F DIAST BP <80 MM HG: CPT | Performed by: FAMILY MEDICINE

## 2021-08-11 PROCEDURE — 3074F SYST BP LT 130 MM HG: CPT | Performed by: FAMILY MEDICINE

## 2021-08-11 PROCEDURE — 99214 OFFICE O/P EST MOD 30 MIN: CPT | Performed by: FAMILY MEDICINE

## 2021-08-12 NOTE — PROGRESS NOTES
HPI:   Too Jimenez is a 67year old male that presents for Patient presents with:  Blood Pressure: elevated at 187 - has started diuretics regularly and states his BP have decreased  Growth: on inner left thigh - states its the same in size     Patient st Tab, Take 500 mg by mouth daily. , Disp: , Rfl:   •  pregabalin (LYRICA) 75 MG Oral Cap, Take 1 capsule (75 mg total) by mouth daily. , Disp: 90 capsule, Rfl: 1  •  IRBESARTAN 150 MG Oral Tab, TAKE 1 TABLET BY MOUTH  DAILY, Disp: 90 tablet, Rfl: 1  •  ATENOL reviewed. Appears stated age, well groomed. Physical Exam:  GEN:  patient is alert, awake and oriented, well developed, well nourished, no apparent distress.   Procedure: Under Betadine prep, I excised the skin tag with sharp scissors and forceps and bleedi

## 2021-08-16 RX ORDER — IRBESARTAN 150 MG/1
TABLET ORAL
Qty: 90 TABLET | Refills: 1 | Status: SHIPPED | OUTPATIENT
Start: 2021-08-16

## 2021-08-16 NOTE — TELEPHONE ENCOUNTER
Hypertension Medications Protocol [id number]/16/2021 04:09 AM   CMP or BMP in past 12 months Protocol Details    Last serum creatinine< 2.0     Appointment in past 6 or next 3 months      Refill protocol passed because the patient met the following protocol

## 2021-08-21 LAB
ALBUMIN/GLOBULIN RATIO: 1.6 (CALC) (ref 1–2.5)
ALBUMIN: 4.2 G/DL (ref 3.6–5.1)
ALKALINE PHOSPHATASE: 81 U/L (ref 35–144)
ALT: 19 U/L (ref 9–46)
AST: 16 U/L (ref 10–35)
BILIRUBIN, TOTAL: 0.5 MG/DL (ref 0.2–1.2)
BUN: 22 MG/DL (ref 7–25)
CALCIUM: 9.2 MG/DL (ref 8.6–10.3)
CARBON DIOXIDE: 28 MMOL/L (ref 20–32)
CHLORIDE: 102 MMOL/L (ref 98–110)
CHOL/HDLC RATIO: 3.7 (CALC)
CHOLESTEROL, TOTAL: 182 MG/DL
CREATININE: 0.89 MG/DL (ref 0.7–1.18)
EGFR IF AFRICN AM: 96 ML/MIN/1.73M2
EGFR IF NONAFRICN AM: 82 ML/MIN/1.73M2
GLOBULIN: 2.7 G/DL (CALC) (ref 1.9–3.7)
GLUCOSE: 136 MG/DL (ref 65–99)
HDL CHOLESTEROL: 49 MG/DL
LDL-CHOLESTEROL: 112 MG/DL (CALC)
NON-HDL CHOLESTEROL: 133 MG/DL (CALC)
POTASSIUM: 4.8 MMOL/L (ref 3.5–5.3)
PROTEIN, TOTAL: 6.9 G/DL (ref 6.1–8.1)
SODIUM: 139 MMOL/L (ref 135–146)
TRIGLYCERIDES: 103 MG/DL

## 2021-09-01 NOTE — TELEPHONE ENCOUNTER
I spoke to Dr Pollack Neighbours and told him patients last K+ was 4.4. He said to refill since patient has chronic low potassium. moderna booster if needed 11- or later  Flu vaccine due in October please.    Labs today    macrobid (nitrofurantoin) 100mg twice daily for 3-5 days as needed for urinary infection symptoms.    Medicare Wellness Visit  Plan for Preventive Care    A good way for you to stay healthy is to use preventive care.  Medicare covers many services that can help you stay healthy.* The goal of these services is to find any health problems as quickly as possible. Finding problems early can help make them easier to treat.  Your personal plan below lists the services you may need and when they are due.     Health Maintenance Summary     Influenza Vaccine (1)  Due since 9/1/2021    DTaP/Tdap/Td Vaccine (2 - Td or Tdap)  Next due on 5/10/2022    Depression Screening (Yearly)  Next due on 7/21/2022    Medicare Wellness Visit (Yearly)  Next due on 9/1/2022    Pneumococcal Vaccine 65+   Completed    Osteoporosis Screening   Completed    Shingles Vaccine   Completed    COVID-19 Vaccine   Completed    Hepatitis B Vaccine   Aged Out    Meningococcal Vaccine   Aged Out    HPV Vaccine   Aged Out           Preventive Care for Women and Men    Heart Screenings (Cardiovascular):  · Blood tests are used to check your cholesterol, lipid and triglyceride levels. High levels can increase your risk for heart disease and stroke. High levels can be treated with medications, diet and exercise. Lowering your levels can help keep your heart and blood vessels healthy.  Your provider will order these tests if they are needed.    · An ultrasound is done to see if you have an abdominal aortic aneurysm (AAA).  This is an enlargement of one of the main blood vessels that delivers blood to the body.   In the [place], 9,000 deaths are caused by AAA.  You may not even know you have this problem and as many as 1 in 3 people will have a serious problem if it is not treated.  Early diagnosis allows for more effective treatment and cure.  If you have a  family history of AAA or are a male age 65-75 who has smoked, you are at higher risk of an AAA.  Your provider can order this test, if needed.    Colorectal Screening:  · There are many tests that are used to check for cancer of your colon and rectum. You and your provider should discuss what test is best for you and when to have it done.  Options include:  · Screening Colonoscopy: exam of the entire colon, seen through a flexible lighted tube.  · Flexible Sigmoidoscopy: exam of the last third (sigmoid portion) of the colon and rectum, seen through a flexible lighted tube.  · Cologuard DNA stool test: a sample of your stool is used to screen for cancer and unseen blood in your stool.  · Fecal Occult Blood Test: a sample of your stool is studied to find any unseen blood    Flu Shot:  · An immunization that helps to prevent influenza (the flu). You should get this every year. The best time to get the shot is in the fall.    Pneumococcal Shot:  • Vaccines are available that can help prevent pneumococcal disease, which is any type of infection caused by Streptococcus pneumoniae bacteria.   Their use can prevent some cases of pneumonia, meningitis, and sepsis. There are two types of pneumococcal vaccines:   o Conjugate vaccines (PCV-13 or Prevnar 13®) - helps protect against the 13 types of pneumococcal bacteria that are the most common causes of serious infections in children and adults.    o Polysaccharide vaccine (PPSV23 or Lewjdwxdd27®) - helps protect against 23 types of pneumococcal bacteria for patients who are recommended to get it.  These vaccines should be given at least 12 months apart.  A booster is usually not needed.     Hepatitis B Shot:  · An immunization that helps to protect people from getting Hepatitis B. Hepatitis B is a virus that spreads through contact with infected blood or body fluids. Many people with the virus do not have symptoms.  The virus can lead to serious problems, such as liver  disease. Some people are at higher risk than others. Your doctor will tell you if you need this shot.     Diabetes Screening:  · A test to measure sugar (glucose) in your blood is called a fasting blood sugar. Fasting means you cannot have food or drink for at least 8 hours before the test. This test can detect diabetes long before you may notice symptoms.    Glaucoma Screening:  · Glaucoma screening is performed by your eye doctor. The test measures the fluid pressure inside your eyes to determine if you have glaucoma.     Hepatitis C Screening:  · A blood test to see if you have the hepatitis C virus.  Hepatitis C attacks the liver and is a major cause of chronic liver disease.  Medicare will cover a single screening for all adults born between 1945 & 1965, or high risk patients (people who have injected illegal drugs or people who have had blood transfusions).  High risk patients who continue to inject illegal drugs can be screened for Hepatitis C every year.    Smoking and Tobacco-Use Cessation Counseling:  · Tobacco is the single greatest cause of disease and early death in our country today. Medication and counseling together can increase a person’s chance of quitting for good.   · Medicare covers two quitting attempts per year, with four counseling sessions per attempt (eight sessions in a 12 month period)    Preventive Screening tests for Women    Screening Mammograms and Breast Exams:  · An x-ray of your breasts to check for breast cancer before you or your doctor may be able to feel it.  If breast cancer is found early it can usually be treated with success.    Pelvic Exams and Pap Tests:  · An exam to check for cervical and vaginal cancer. A Pap test is a lab test in which cells are taken from your cervix and sent to the lab to look for signs of cervical cancer. If cancer of the cervix is found early, chances for a cure are good. Testing can generally end at age 65, or if a woman has a hysterectomy for a  benign condition. Your provider may recommend more frequent testing if certain abnormal results are found.    Bone Mass Measurements:  · A painless x-ray of your bone density to see if you are at risk for a broken bone. Bone density refers to the thickness of bones or how tightly the bone tissue is packed.    Preventive Screening tests for Men    Prostate Screening:  · Should you have a prostate cancer test (PSA)?  It is up to you to decide if you want a prostate cancer test. Talk to your clinician to find out if the test is right for you.  Things for you to consider and talk about should include:  · Benefits and harms of the test  · Your family history  · How your race/ethnicity may influence the test  · If the test may impact other medical conditions you have  · Your values on screenings and treatments    *Medicare pays for many preventive services to keep you healthy. For some of these services, you might have to pay a deductible, coinsurance, and / or copayment.  The amounts vary depending on the type of services you need and the kind of Medicare health plan you have.    For further details on screenings offered by Medicare please visit: https://www.medicare.gov/coverage/preventive-screening-services

## 2021-09-20 RX ORDER — TAMSULOSIN HYDROCHLORIDE 0.4 MG/1
CAPSULE ORAL
Qty: 180 CAPSULE | Refills: 3 | OUTPATIENT
Start: 2021-09-20

## 2021-09-20 RX ORDER — ATENOLOL 25 MG/1
TABLET ORAL
Qty: 90 TABLET | Refills: 3 | Status: SHIPPED | OUTPATIENT
Start: 2021-09-20

## 2021-09-20 NOTE — TELEPHONE ENCOUNTER
Requested Prescriptions     Name from pharmacy: Tamsulosin HCl 0.4 MG Oral Capsule         Will file in chart as: tamsulosin (FLOMAX) cap    Sig: TAKE 2 CAPSULES BY MOUTH  DAILY    Disp:  180 capsule    Refills:  3    Start: 9/19/2021    Class: Normal    N

## 2021-09-22 RX ORDER — FUROSEMIDE 20 MG/1
TABLET ORAL
Qty: 90 TABLET | Refills: 0 | Status: SHIPPED | OUTPATIENT
Start: 2021-09-22 | End: 2021-11-23

## 2021-09-22 NOTE — TELEPHONE ENCOUNTER
Requested Prescriptions     Name from pharmacy: Furosemide 20 MG Oral Tablet         Will file in chart as: FUROSEMIDE 20 MG Oral Tab    Sig: TAKE 1 TABLET BY MOUTH  DAILY    Disp:  90 tablet    Refills:  3    Start: 9/22/2021    Class: Normal    Non-formu

## 2021-09-23 RX ORDER — PREGABALIN 75 MG/1
75 CAPSULE ORAL DAILY
Qty: 90 CAPSULE | Refills: 1 | Status: SHIPPED | OUTPATIENT
Start: 2021-09-23

## 2021-09-23 NOTE — TELEPHONE ENCOUNTER
Requesting Pregabalin 75mg  LOV: 8/11/21  RTC: prn  Last Relevant Labs: 8/20/21  Filled: 4/5/21 #90 with 1 refills    Future Appointments   Date Time Provider Agatha Hogan   9/29/2021  2:10 PM Olinda Dowell MD PL E[facility]   10/25/2021  4:00 PM

## 2021-09-29 ENCOUNTER — TELEPHONE (OUTPATIENT)
Dept: FAMILY MEDICINE CLINIC | Facility: CLINIC | Age: 76
End: 2021-09-29

## 2021-09-29 NOTE — TELEPHONE ENCOUNTER
Pt had last COVID shot in February. He would like to know when he can get the booster. He would like to know where he can get it too.

## 2021-09-29 NOTE — TELEPHONE ENCOUNTER
Spoke with pt, informed him as of today he can [facility] 3rd [facility] through [address] Pt verbalized understanding and agreement. All questions answered.

## 2021-09-30 ENCOUNTER — IMMUNIZATION (OUTPATIENT)
Dept: LAB | Facility: HOSPITAL | Age: 76
End: 2021-09-30
Attending: EMERGENCY MEDICINE
Payer: MEDICARE

## 2021-09-30 DIAGNOSIS — Z23 NEED FOR VACCINATION: Primary | ICD-10-CM

## 2021-09-30 PROCEDURE — 0003A SARSCOV2 VAC 30MCG/0.3ML IM: CPT

## 2021-10-11 ENCOUNTER — HOSPITAL ENCOUNTER (OUTPATIENT)
Dept: CT IMAGING | Age: 76
Discharge: HOME | End: 2021-10-11
Attending: OTOLARYNGOLOGY
Payer: MEDICARE

## 2021-10-11 DIAGNOSIS — H90.A32 MIXED CONDUCTIVE AND SENSORINEURAL HEARING LOSS OF LEFT EAR WITH RESTRICTED HEARING OF RIGHT EAR: ICD-10-CM

## 2021-10-11 PROCEDURE — 70480 CT ORBIT/EAR/FOSSA W/O DYE: CPT | Performed by: OTOLARYNGOLOGY

## 2021-10-15 ENCOUNTER — APPOINTMENT (OUTPATIENT)
Dept: CARDIOLOGY | Age: 76
End: 2021-10-15

## 2021-10-18 LAB — AMB EXT HGBA1C: 6.7 %

## 2021-10-25 ENCOUNTER — OFFICE VISIT (OUTPATIENT)
Dept: FAMILY MEDICINE CLINIC | Facility: CLINIC | Age: 76
End: 2021-10-25
Payer: COMMERCIAL

## 2021-10-25 VITALS
HEIGHT: 66 IN | BODY MASS INDEX: 32.95 KG/M2 | OXYGEN SATURATION: 98 % | TEMPERATURE: 97 F | WEIGHT: 205 LBS | HEART RATE: 65 BPM | RESPIRATION RATE: 18 BRPM | DIASTOLIC BLOOD PRESSURE: 74 MMHG | SYSTOLIC BLOOD PRESSURE: 122 MMHG

## 2021-10-25 DIAGNOSIS — E11.40 TYPE 2 DIABETES MELLITUS, UNCONTROLLED, WITH NEUROPATHY (HCC): Primary | ICD-10-CM

## 2021-10-25 DIAGNOSIS — Z12.5 PROSTATE CANCER SCREENING: ICD-10-CM

## 2021-10-25 DIAGNOSIS — E11.65 TYPE 2 DIABETES MELLITUS, UNCONTROLLED, WITH NEUROPATHY (HCC): Primary | ICD-10-CM

## 2021-10-25 DIAGNOSIS — Z23 NEED FOR VACCINATION: ICD-10-CM

## 2021-10-25 DIAGNOSIS — E78.2 MIXED HYPERLIPIDEMIA: ICD-10-CM

## 2021-10-25 PROCEDURE — 3074F SYST BP LT 130 MM HG: CPT | Performed by: FAMILY MEDICINE

## 2021-10-25 PROCEDURE — G0009 ADMIN PNEUMOCOCCAL VACCINE: HCPCS | Performed by: FAMILY MEDICINE

## 2021-10-25 PROCEDURE — 90662 IIV NO PRSV INCREASED AG IM: CPT | Performed by: FAMILY MEDICINE

## 2021-10-25 PROCEDURE — 3078F DIAST BP <80 MM HG: CPT | Performed by: FAMILY MEDICINE

## 2021-10-25 PROCEDURE — G0008 ADMIN INFLUENZA VIRUS VAC: HCPCS | Performed by: FAMILY MEDICINE

## 2021-10-25 PROCEDURE — 90732 PPSV23 VACC 2 YRS+ SUBQ/IM: CPT | Performed by: FAMILY MEDICINE

## 2021-10-25 PROCEDURE — 99214 OFFICE O/P EST MOD 30 MIN: CPT | Performed by: FAMILY MEDICINE

## 2021-10-25 PROCEDURE — 3008F BODY MASS INDEX DOCD: CPT | Performed by: FAMILY MEDICINE

## 2021-10-25 RX ORDER — SEMAGLUTIDE 1.34 MG/ML
0.25 INJECTION, SOLUTION SUBCUTANEOUS
COMMUNITY
Start: 2021-10-18

## 2021-10-25 RX ORDER — TAMSULOSIN HYDROCHLORIDE 0.4 MG/1
1 CAPSULE ORAL AS DIRECTED
COMMUNITY
End: 2021-11-07

## 2021-10-25 RX ORDER — ATORVASTATIN CALCIUM 20 MG/1
20 TABLET, FILM COATED ORAL DAILY
COMMUNITY
Start: 2021-10-15

## 2021-10-25 RX ORDER — ERGOCALCIFEROL 1.25 MG/1
CAPSULE ORAL
COMMUNITY
Start: 2021-10-04 | End: 2022-01-10

## 2021-10-28 NOTE — PROGRESS NOTES
HPI:   Mirza Young is a 67year old male that presents for Patient presents with: Follow - Up: 6mos medication follow up.  A1c and micro done at [address] @ [facility]  Immunization/Injection: Flu shot consent    Patient [facility] by mouth once a week., Disp: , Rfl:   •  aspirin 81 MG Oral Tab EC, Take 81 mg by mouth daily. , Disp: , Rfl:   •  Blood Glucose Monitoring Suppl (ACCU-CHEK GUIDE) w/Device Does not apply Kit, 1 kit by Does not apply route., Disp: , Rfl:   •  Insuli as of this encounter: 205 lb (93 kg). Vital signs reviewed. Appears stated age, well groomed. Physical Exam:  GEN:  patient is alert, awake and oriented, well developed, well nourished, no apparent distress.   HEENT:  Head:  normocephalic, atraumatic diabetes. Also monitor cholesterol level. Continue current medication regimen which was reviewed with him. Risks, benefits, and alternatives of current treatment plan discussed in detail. Questions and concerns addressed.  Red flags to RTC or ED reviewe

## 2021-11-06 NOTE — TELEPHONE ENCOUNTER
Requested Prescriptions     Name from pharmacy: Tamsulosin HCl 0.4 MG Oral Capsule         Will file in chart as: tamsulosin (FLOMAX) cap    Sig: TAKE 2 CAPSULES BY MOUTH  DAILY    Disp:  180 capsule    Refills:  3    Start: 11/5/2021    Class: Normal    N

## 2021-11-07 RX ORDER — TAMSULOSIN HYDROCHLORIDE 0.4 MG/1
CAPSULE ORAL
Qty: 180 CAPSULE | Refills: 3 | Status: SHIPPED | OUTPATIENT
Start: 2021-11-07

## 2021-11-23 RX ORDER — FUROSEMIDE 20 MG/1
TABLET ORAL
Qty: 90 TABLET | Refills: 1 | Status: SHIPPED | OUTPATIENT
Start: 2021-11-23

## 2021-11-23 NOTE — TELEPHONE ENCOUNTER
Hypertension Medications Protocol Passed 11/23/2021 04:08 AM   Protocol Details  CMP or BMP in past 12 months    Last serum creatinine< 2.0    Appointment in past 6 or next 3 months     Refill protocol passed because the patient met the following protocol

## 2021-12-27 ENCOUNTER — TELEMEDICINE (OUTPATIENT)
Dept: FAMILY MEDICINE CLINIC | Facility: CLINIC | Age: 76
End: 2021-12-27
Payer: COMMERCIAL

## 2021-12-27 DIAGNOSIS — J22 LRTI (LOWER RESPIRATORY TRACT INFECTION): ICD-10-CM

## 2021-12-27 DIAGNOSIS — R05.9 COUGH: Primary | ICD-10-CM

## 2021-12-27 PROCEDURE — 99442 PHONE E/M BY PHYS 11-20 MIN: CPT | Performed by: FAMILY MEDICINE

## 2021-12-27 RX ORDER — AZITHROMYCIN 250 MG/1
TABLET, FILM COATED ORAL
Qty: 6 TABLET | Refills: 0 | Status: SHIPPED | OUTPATIENT
Start: 2021-12-27 | End: 2022-01-01

## 2021-12-27 RX ORDER — BENZONATATE 100 MG/1
100 CAPSULE ORAL 3 TIMES DAILY PRN
Qty: 30 CAPSULE | Refills: 0 | Status: SHIPPED | OUTPATIENT
Start: 2021-12-27 | End: 2022-01-10

## 2021-12-27 NOTE — PROGRESS NOTES
VIRTUAL/TELEPHONE ENCOUNTER    Subjective    This visit is conducted using live telephone encounter with patient due to [facility] emergency.      Chief Complaint:  Patient presents with:  Cough        The patient confirmed knowledge of the limitations of the us Past Surgical History:   Procedure Laterality Date   • SINUS SURGERY          Family History   Problem Relation Age of Onset   • Heart Disease Neg    • Diabetes Neg    • High Blood Pressure Neg       Social History    Tobacco Use      Smoking status: [employer] now.   - 10 day quarantine recommended if positive. - patient agrees with plan.    -rtc as needed, sooner if s/s worsen      Diagnostic rationale, follow up instructions, and strict precautions/indications for emergent direct evaluation were discussed wi

## 2021-12-27 NOTE — PATIENT INSTRUCTIONS
Coronavirus Disease 2019 (COVID-19)     [facility] [address] is committed to the safety and well-being of our patients, members, employees, and communities.  As concerns arise about the new strain of coronavirus that causes COVID-19, [facility] [place] [address] exposure  • After day 7 from date of last exposure with a negative test result (test must occur on day 5 or later)  After stopping quarantine, you should  • Watch for symptoms until 14 days after exposure.   • If you have symptoms, immediately self-isolate Care     If you are awaiting test results or are confirmed positive for COVID -19, and your symptoms worsen at home with symptoms such as: extreme weakness, difficult breathing, or unrelenting fevers greater than 100.4 degrees Fahrenheit, you should contac Follow-up  If you are diagnosed with COVID, refrain from exercise until approved by your primary care provider. Please call your primary care provider within 2 days of your discharge to arrange for a telehealth follow-up.  CDC does not recommend repeat test Control & Prevention (CDC)  10 things you can do to manage your health at home, [employer]. pdf  [employer] Retrieved March 17, 2021, from https://health.[facility]/coronavirus/covid-19-information/covid-19-long-haulers. html  Long-term effects of covid-19. (n.d.).  Retrieved May 11, 2021, from [employer]

## 2021-12-28 ENCOUNTER — LAB ENCOUNTER (OUTPATIENT)
Dept: LAB | Age: 76
End: 2021-12-28
Attending: FAMILY MEDICINE
Payer: MEDICARE

## 2021-12-28 DIAGNOSIS — R05.9 COUGH: ICD-10-CM

## 2021-12-30 LAB — SARS-COV-2 RNA RESP QL NAA+PROBE: NOT DETECTED

## 2022-01-03 ENCOUNTER — TELEPHONE (OUTPATIENT)
Dept: FAMILY MEDICINE CLINIC | Facility: CLINIC | Age: 77
End: 2022-01-03

## 2022-01-03 NOTE — TELEPHONE ENCOUNTER
- Pt has scheduled a follow up. Please advise if patient should come in sooner then scheduled.     Future Appointments   Date Time Provider Agatha Samira   1/17/2022 10:40 AM Rachana Spear MD [facility] [address]   1/26/2022  1:10 PM Jaylen

## 2022-01-03 NOTE — TELEPHONE ENCOUNTER
See phone message below:    Pt had VV with Dr. Christie Wisdom on 12/17/21 and schedule a follow up with you on 1/17/22. Covid test negative. Still with symptoms. Do you want him to schedule sooner? Can we use one of your SDA next week or virtual visit ok?

## 2022-01-03 NOTE — TELEPHONE ENCOUNTER
ANTICOAGULATION FOLLOW-UP CLINIC VISIT    Patient Name:  Maia Miranda  Date:  4/15/2021  Contact Type:  Telephone    SUBJECTIVE:  Patient Findings     Positives:  Change in health (repeated UTI's), Change in medications (Keflex, 250 mg, daily (long-term) )    Comments:  Called patient to discuss today's INR results: Patient is back on prophylactic Keflex daily. Discussed to watch for any increased bruising/bleeding. Otherwise, The patient was assessed for diet, medication, and activity level changes, missed or extra doses, bruising or bleeding, with no problem findings. Reviewed maintenance warfarin dosing with patient. Patient will remain on the same dose until next INR check. No other questions or concerns. Scheduled next lab-only INR in 4 weeks.  Dian PEREZ RN  Anticoagulation Team            Clinical Outcomes     Negatives:  Major bleeding event, Thromboembolic event, Anticoagulation-related hospital admission, Anticoagulation-related ED visit, Anticoagulation-related fatality    Comments:  Called patient to discuss today's INR results: Patient is back on prophylactic Keflex daily. Discussed to watch for any increased bruising/bleeding. Otherwise, The patient was assessed for diet, medication, and activity level changes, missed or extra doses, bruising or bleeding, with no problem findings. Reviewed maintenance warfarin dosing with patient. Patient will remain on the same dose until next INR check. No other questions or concerns. Scheduled next lab-only INR in 4 weeks.  Dian PEREZ RN  Anticoagulation Team               OBJECTIVE    Recent labs: (last 7 days)     04/15/21  1044   INR 2.20*       ASSESSMENT / PLAN  INR assessment THER    Recheck INR In: 4 WEEKS    INR Location Clinic      Anticoagulation Summary  As of 4/15/2021    INR goal:  2.0-3.0   TTR:  65.9 % (1 y)   INR used for dosin.20 (4/15/2021)   Warfarin maintenance plan:  1.25 mg (2.5 mg x 0.5) every Mon, Fri; 2.5 mg (2.5 mg x 1) all other  Yes, sooner and in person would be helpful. Can use sda. days   Full warfarin instructions:  1.25 mg every Mon, Fri; 2.5 mg all other days   Weekly warfarin total:  15 mg   No change documented:  Dian Astudillo RN   Plan last modified:  Sahara Matos RN (12/31/2020)   Next INR check:  5/13/2021   Priority:  Maintenance   Target end date:  Indefinite    Indications    Atrial fibrillation (H) [I48.91]  Long term current use of anticoagulant therapy [Z79.01]  Longstanding persistent atrial fibrillation (H) [I48.11]  Long term current use of anticoagulants with INR goal of 2.0-3.0 [Z79.01]  Atrial fibrillation  unspecified type (H) [I48.91]             Anticoagulation Episode Summary     INR check location:  Anticoagulation Clinic    Preferred lab:      Send INR reminders to:  MICHAEL ABURTO    Comments:  2.5mg tablets // 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

## 2022-01-03 NOTE — TELEPHONE ENCOUNTER
Future Appointments   Date Time Provider Agatha Samira   1/10/2022  2:40 PM Ozzy Andrew MD [facility] [address] [facility] [address]   1/26/2022  1:10 PM Heather York MD PL [facility]   4/25/2022 11:20 AM Ozzy Andrew MD [facility] [address] [facility] [address]

## 2022-01-10 ENCOUNTER — OFFICE VISIT (OUTPATIENT)
Dept: FAMILY MEDICINE CLINIC | Facility: CLINIC | Age: 77
End: 2022-01-10
Payer: COMMERCIAL

## 2022-01-10 VITALS
HEART RATE: 52 BPM | SYSTOLIC BLOOD PRESSURE: 110 MMHG | WEIGHT: 198 LBS | RESPIRATION RATE: 16 BRPM | DIASTOLIC BLOOD PRESSURE: 60 MMHG | OXYGEN SATURATION: 99 % | HEIGHT: 66 IN | BODY MASS INDEX: 31.82 KG/M2 | TEMPERATURE: 97 F

## 2022-01-10 DIAGNOSIS — J06.9 VIRAL UPPER RESPIRATORY TRACT INFECTION: Primary | ICD-10-CM

## 2022-01-10 PROCEDURE — 3008F BODY MASS INDEX DOCD: CPT | Performed by: FAMILY MEDICINE

## 2022-01-10 PROCEDURE — 3078F DIAST BP <80 MM HG: CPT | Performed by: FAMILY MEDICINE

## 2022-01-10 PROCEDURE — 3074F SYST BP LT 130 MM HG: CPT | Performed by: FAMILY MEDICINE

## 2022-01-10 PROCEDURE — 99213 OFFICE O/P EST LOW 20 MIN: CPT | Performed by: FAMILY MEDICINE

## 2022-01-13 PROBLEM — G98.8 DISORDER OF NERVOUS SYSTEM DUE TO TYPE 2 DIABETES MELLITUS  (HCC): Status: ACTIVE | Noted: 2022-01-13

## 2022-01-13 PROBLEM — E11.69 DISORDER OF NERVOUS SYSTEM DUE TO TYPE 2 DIABETES MELLITUS: Status: ACTIVE | Noted: 2022-01-13

## 2022-01-13 PROBLEM — E11.69 DISORDER OF NERVOUS SYSTEM DUE TO TYPE 2 DIABETES MELLITUS  (HCC): Status: ACTIVE | Noted: 2022-01-13

## 2022-01-13 PROBLEM — G98.8 DISORDER OF NERVOUS SYSTEM DUE TO TYPE 2 DIABETES MELLITUS: Status: ACTIVE | Noted: 2022-01-13

## 2022-01-13 PROBLEM — E11.69 DISORDER OF NERVOUS SYSTEM DUE TO TYPE 2 DIABETES MELLITUS (HCC): Status: ACTIVE | Noted: 2022-01-13

## 2022-01-13 PROBLEM — E11.42 POLYNEUROPATHY DUE TO TYPE 2 DIABETES MELLITUS (HCC): Status: ACTIVE | Noted: 2022-01-13

## 2022-01-13 PROBLEM — G98.8 DISORDER OF NERVOUS SYSTEM DUE TO TYPE 2 DIABETES MELLITUS (HCC): Status: ACTIVE | Noted: 2022-01-13

## 2022-01-13 PROBLEM — M79.609 PAIN IN LIMB: Status: ACTIVE | Noted: 2022-01-13

## 2022-01-13 NOTE — PROGRESS NOTES
HPI:   Mango Cordoba is a 67year old male that presents for Patient presents with:  Cough: follow up from tele visit with Dr Tashi Laird on 12/27/21  Weakness: he is feeling better but still having weakness    Patient is here for follow-up from phone visit pr Tab EC, Take 81 mg by mouth daily. , Disp: , Rfl:   •  Blood Glucose Monitoring Suppl (ACCU-CHEK GUIDE) w/Device Does not apply Kit, 1 kit by Does not apply route., Disp: , Rfl:   •  Insulin Pen Needle 31G X 8 MM Does not apply Misc, 4/day, Disp: , Rfl:   • Ht 5' 6\" (1.676 m)   Wt 198 lb (89.8 kg)   SpO2 99%   BMI 31.96 kg/m²  Estimated body mass index is 31.96 kg/m² as calculated from the following:    Height as of this encounter: 5' 6\" (1.676 m). Weight as of this encounter: 198 lb (89.8 kg).    Vital s alternatives of current treatment plan discussed in detail. Questions and concerns addressed. Red flags to RTC or ED reviewed. Patient (or parent) agrees to plan. No follow-ups on file. Fred Manjarrez M.D.   Family Medicine   1/13/2022  8:05 AM

## 2022-02-10 RX ORDER — IRBESARTAN 150 MG/1
TABLET ORAL
Qty: 90 TABLET | Refills: 0 | Status: SHIPPED | OUTPATIENT
Start: 2022-02-10

## 2022-03-07 ENCOUNTER — TELEPHONE (OUTPATIENT)
Dept: FAMILY MEDICINE CLINIC | Facility: CLINIC | Age: 77
End: 2022-03-07

## 2022-03-07 NOTE — TELEPHONE ENCOUNTER
Pt is calling to request a Referral for Physical therapy for [facility]. He called the referral dept and they asked him for a Pin number which he did not have so he hung up. He would like to be notified if that is done.

## 2022-03-07 NOTE — TELEPHONE ENCOUNTER
Future Appointments   Date Time Provider Agatha Samira   3/9/2022  2:00 PM Kita Degroot MD [facility] [address] [facility] [address]

## 2022-03-09 ENCOUNTER — OFFICE VISIT (OUTPATIENT)
Dept: FAMILY MEDICINE CLINIC | Facility: CLINIC | Age: 77
End: 2022-03-09
Payer: COMMERCIAL

## 2022-03-09 VITALS
DIASTOLIC BLOOD PRESSURE: 60 MMHG | WEIGHT: 201 LBS | HEIGHT: 66 IN | OXYGEN SATURATION: 99 % | RESPIRATION RATE: 18 BRPM | BODY MASS INDEX: 32.3 KG/M2 | HEART RATE: 58 BPM | SYSTOLIC BLOOD PRESSURE: 120 MMHG | TEMPERATURE: 97 F

## 2022-03-09 DIAGNOSIS — M54.9 UPPER BACK PAIN: ICD-10-CM

## 2022-03-09 DIAGNOSIS — M54.50 LOW BACK PAIN, UNSPECIFIED BACK PAIN LATERALITY, UNSPECIFIED CHRONICITY, UNSPECIFIED WHETHER SCIATICA PRESENT: ICD-10-CM

## 2022-03-09 DIAGNOSIS — M54.2 CERVICAL PAIN: ICD-10-CM

## 2022-03-09 DIAGNOSIS — Z71.84 COUNSELING ABOUT TRAVEL: Primary | ICD-10-CM

## 2022-03-09 PROCEDURE — 3078F DIAST BP <80 MM HG: CPT | Performed by: FAMILY MEDICINE

## 2022-03-09 PROCEDURE — 99214 OFFICE O/P EST MOD 30 MIN: CPT | Performed by: FAMILY MEDICINE

## 2022-03-09 PROCEDURE — 3074F SYST BP LT 130 MM HG: CPT | Performed by: FAMILY MEDICINE

## 2022-03-09 PROCEDURE — 3008F BODY MASS INDEX DOCD: CPT | Performed by: FAMILY MEDICINE

## 2022-03-14 ENCOUNTER — LAB ENCOUNTER (OUTPATIENT)
Dept: LAB | Age: 77
End: 2022-03-14
Attending: FAMILY MEDICINE
Payer: MEDICARE

## 2022-03-14 DIAGNOSIS — Z71.84 COUNSELING ABOUT TRAVEL: ICD-10-CM

## 2022-03-15 LAB — SARS-COV-2 RNA RESP QL NAA+PROBE: NOT DETECTED

## 2022-03-16 LAB — PSA, TOTAL: 0.69 NG/ML

## 2022-04-25 ENCOUNTER — OFFICE VISIT (OUTPATIENT)
Dept: FAMILY MEDICINE CLINIC | Facility: CLINIC | Age: 77
End: 2022-04-25
Payer: COMMERCIAL

## 2022-04-25 VITALS
TEMPERATURE: 97 F | SYSTOLIC BLOOD PRESSURE: 128 MMHG | DIASTOLIC BLOOD PRESSURE: 60 MMHG | HEART RATE: 54 BPM | OXYGEN SATURATION: 98 % | RESPIRATION RATE: 16 BRPM | BODY MASS INDEX: 32.16 KG/M2 | HEIGHT: 66 IN | WEIGHT: 200.13 LBS

## 2022-04-25 DIAGNOSIS — Z00.00 ROUTINE ADULT HEALTH MAINTENANCE: Primary | ICD-10-CM

## 2022-04-25 DIAGNOSIS — Z79.4 TYPE 2 DIABETES MELLITUS WITH HYPERGLYCEMIA, WITH LONG-TERM CURRENT USE OF INSULIN (HCC): ICD-10-CM

## 2022-04-25 DIAGNOSIS — E11.65 TYPE 2 DIABETES MELLITUS WITH HYPERGLYCEMIA, WITH LONG-TERM CURRENT USE OF INSULIN (HCC): ICD-10-CM

## 2022-04-25 DIAGNOSIS — I27.20 PULMONARY HTN (HCC): ICD-10-CM

## 2022-04-25 DIAGNOSIS — D69.6 THROMBOCYTOPENIA, UNSPECIFIED (HCC): ICD-10-CM

## 2022-04-25 RX ORDER — PREGABALIN 75 MG/1
75 CAPSULE ORAL DAILY
Qty: 90 CAPSULE | Refills: 3 | Status: SHIPPED | OUTPATIENT
Start: 2022-04-25

## 2022-04-25 RX ORDER — EMPAGLIFLOZIN 25 MG/1
1 TABLET, FILM COATED ORAL DAILY
Qty: 90 TABLET | Refills: 3 | Status: SHIPPED | OUTPATIENT
Start: 2022-04-25

## 2022-04-25 RX ORDER — IRBESARTAN 150 MG/1
TABLET ORAL
Qty: 90 TABLET | Refills: 3 | OUTPATIENT
Start: 2022-04-25

## 2022-04-25 RX ORDER — IRBESARTAN 150 MG/1
150 TABLET ORAL DAILY
Qty: 90 TABLET | Refills: 3 | Status: SHIPPED | OUTPATIENT
Start: 2022-04-25

## 2022-05-09 LAB — AMB EXT HGBA1C: 7.4 %

## 2022-06-23 ENCOUNTER — TELEPHONE (OUTPATIENT)
Dept: FAMILY MEDICINE CLINIC | Facility: CLINIC | Age: 77
End: 2022-06-23

## 2022-06-23 NOTE — TELEPHONE ENCOUNTER
Received fax from [facility]. They faxed over a Visit note from 2/23/22, and also a form. It is asking for dr to complete the form for medicare requirements for reimbursement for diabetes [employer] bill. There is also a Statement of Certifying Physician. It is also requesting the most recent Diabetes Management Exam note. Would like faxed back to [phone].

## 2022-08-15 NOTE — TELEPHONE ENCOUNTER
Patient states he takes tamsulosin. It was prescribed by his previous doctor. He would like a refill.  Pended and routed to pcp if ok Tarsorrhaphy Text: A tarsorrhaphy was performed using Frost sutures.

## 2022-10-24 ENCOUNTER — OFFICE VISIT (OUTPATIENT)
Dept: FAMILY MEDICINE CLINIC | Facility: CLINIC | Age: 77
End: 2022-10-24
Payer: COMMERCIAL

## 2022-10-24 VITALS
DIASTOLIC BLOOD PRESSURE: 70 MMHG | SYSTOLIC BLOOD PRESSURE: 130 MMHG | OXYGEN SATURATION: 99 % | WEIGHT: 204 LBS | TEMPERATURE: 97 F | HEIGHT: 66 IN | HEART RATE: 61 BPM | RESPIRATION RATE: 16 BRPM | BODY MASS INDEX: 32.78 KG/M2

## 2022-10-24 DIAGNOSIS — E78.2 MIXED HYPERLIPIDEMIA: ICD-10-CM

## 2022-10-24 DIAGNOSIS — Z79.4 TYPE 2 DIABETES MELLITUS WITHOUT COMPLICATION, WITH LONG-TERM CURRENT USE OF INSULIN (HCC): ICD-10-CM

## 2022-10-24 DIAGNOSIS — K13.0 LIP LESION: ICD-10-CM

## 2022-10-24 DIAGNOSIS — Z00.00 ROUTINE ADULT HEALTH MAINTENANCE: Primary | ICD-10-CM

## 2022-10-24 DIAGNOSIS — E11.9 TYPE 2 DIABETES MELLITUS WITHOUT COMPLICATION, WITH LONG-TERM CURRENT USE OF INSULIN (HCC): ICD-10-CM

## 2022-10-24 DIAGNOSIS — E11.42 POLYNEUROPATHY DUE TO TYPE 2 DIABETES MELLITUS (HCC): ICD-10-CM

## 2022-10-24 DIAGNOSIS — D69.6 THROMBOCYTOPENIA (HCC): Chronic | ICD-10-CM

## 2022-10-24 DIAGNOSIS — E11.65 TYPE 2 DIABETES MELLITUS WITH HYPERGLYCEMIA, WITH LONG-TERM CURRENT USE OF INSULIN (HCC): ICD-10-CM

## 2022-10-24 DIAGNOSIS — Z79.4 TYPE 2 DIABETES MELLITUS WITH HYPERGLYCEMIA, WITH LONG-TERM CURRENT USE OF INSULIN (HCC): ICD-10-CM

## 2022-10-24 DIAGNOSIS — E66.09 CLASS 1 OBESITY DUE TO EXCESS CALORIES WITH SERIOUS COMORBIDITY AND BODY MASS INDEX (BMI) OF 32.0 TO 32.9 IN ADULT: ICD-10-CM

## 2022-10-24 DIAGNOSIS — I10 ESSENTIAL HYPERTENSION, BENIGN: ICD-10-CM

## 2022-10-24 DIAGNOSIS — E03.9 HYPOTHYROIDISM, UNSPECIFIED TYPE: ICD-10-CM

## 2022-10-24 DIAGNOSIS — I27.20 PULMONARY HTN (HCC): ICD-10-CM

## 2022-10-24 DIAGNOSIS — I25.10 ATHEROSCLEROSIS OF CORONARY ARTERY OF NATIVE HEART WITHOUT ANGINA PECTORIS, UNSPECIFIED VESSEL OR LESION TYPE: ICD-10-CM

## 2022-10-24 LAB
CREAT UR-SCNC: 36 MG/DL
MICROALBUMIN UR-MCNC: 4.36 MG/DL
MICROALBUMIN/CREAT 24H UR-RTO: 121.1 UG/MG (ref ?–30)

## 2022-10-24 PROCEDURE — 99214 OFFICE O/P EST MOD 30 MIN: CPT | Performed by: FAMILY MEDICINE

## 2022-10-24 PROCEDURE — 1126F AMNT PAIN NOTED NONE PRSNT: CPT | Performed by: FAMILY MEDICINE

## 2022-10-24 PROCEDURE — 82570 ASSAY OF URINE CREATININE: CPT | Performed by: FAMILY MEDICINE

## 2022-10-24 PROCEDURE — 3008F BODY MASS INDEX DOCD: CPT | Performed by: FAMILY MEDICINE

## 2022-10-24 PROCEDURE — 3078F DIAST BP <80 MM HG: CPT | Performed by: FAMILY MEDICINE

## 2022-10-24 PROCEDURE — 82043 UR ALBUMIN QUANTITATIVE: CPT | Performed by: FAMILY MEDICINE

## 2022-10-24 PROCEDURE — 3075F SYST BP GE 130 - 139MM HG: CPT | Performed by: FAMILY MEDICINE

## 2022-10-25 PROBLEM — E11.69 DISORDER OF NERVOUS SYSTEM DUE TO TYPE 2 DIABETES MELLITUS: Status: RESOLVED | Noted: 2022-01-13 | Resolved: 2022-10-25

## 2022-10-25 PROBLEM — R22.9 SKIN MASS: Status: RESOLVED | Noted: 2021-05-06 | Resolved: 2022-10-25

## 2022-10-25 PROBLEM — D22.9 NEVUS: Status: RESOLVED | Noted: 2021-05-06 | Resolved: 2022-10-25

## 2022-10-25 PROBLEM — E11.69 DISORDER OF NERVOUS SYSTEM DUE TO TYPE 2 DIABETES MELLITUS  (HCC): Status: RESOLVED | Noted: 2022-01-13 | Resolved: 2022-10-25

## 2022-10-25 PROBLEM — G98.8 DISORDER OF NERVOUS SYSTEM DUE TO TYPE 2 DIABETES MELLITUS (HCC): Status: RESOLVED | Noted: 2022-01-13 | Resolved: 2022-10-25

## 2022-10-25 PROBLEM — G98.8 DISORDER OF NERVOUS SYSTEM DUE TO TYPE 2 DIABETES MELLITUS  (HCC): Status: RESOLVED | Noted: 2022-01-13 | Resolved: 2022-10-25

## 2022-10-25 PROBLEM — E11.69 DISORDER OF NERVOUS SYSTEM DUE TO TYPE 2 DIABETES MELLITUS (HCC): Status: RESOLVED | Noted: 2022-01-13 | Resolved: 2022-10-25

## 2022-10-25 PROBLEM — H92.02 LEFT EAR PAIN: Status: RESOLVED | Noted: 2021-07-11 | Resolved: 2022-10-25

## 2022-10-25 PROBLEM — G98.8 DISORDER OF NERVOUS SYSTEM DUE TO TYPE 2 DIABETES MELLITUS: Status: RESOLVED | Noted: 2022-01-13 | Resolved: 2022-10-25

## 2022-10-25 PROBLEM — M79.609 PAIN IN LIMB: Status: RESOLVED | Noted: 2022-01-13 | Resolved: 2022-10-25

## 2022-10-26 ENCOUNTER — TELEPHONE (OUTPATIENT)
Dept: FAMILY MEDICINE CLINIC | Facility: CLINIC | Age: 77
End: 2022-10-26

## 2022-10-26 NOTE — TELEPHONE ENCOUNTER
Reached patient for medication adherence consult. Patient overdue for refill on atorvastatin per insurance report. Patient reports that he has been taking his medication as prescribed without missing doses. Patient states that he has enough medication at home and will call his mail order pharmacy when he is ready for a refill. Patient hesitant to speak about his medications. Patient had both olmesartan and irbesartan on his medication list; confirmed with patient that he is no longer taking olmesartan. Removed olmesartan from patient's medication list.     Provided education on importance of adherence to medications. Discussed cardiovascular benefits of statins, renal protection with ARB (irbesartan) and importance of glucose control with his medications for diabetes.

## 2022-10-27 DIAGNOSIS — E11.9 TYPE 2 DIABETES MELLITUS WITHOUT COMPLICATION, WITH LONG-TERM CURRENT USE OF INSULIN (HCC): Primary | ICD-10-CM

## 2022-10-27 DIAGNOSIS — Z79.4 TYPE 2 DIABETES MELLITUS WITHOUT COMPLICATION, WITH LONG-TERM CURRENT USE OF INSULIN (HCC): Primary | ICD-10-CM

## 2022-11-01 NOTE — TELEPHONE ENCOUNTER
Requesting Pregabalin 75mg  LOV: 10/24/22 Physical  RTC: 1 year  Last Relevant Labs: 8/20/21  Filled: 4/25/22 #90 with 3 refills    No future appointments.     Rx pended and routed for approval/denial

## 2022-11-02 RX ORDER — PREGABALIN 75 MG/1
75 CAPSULE ORAL DAILY
Qty: 90 CAPSULE | Refills: 1 | Status: SHIPPED | OUTPATIENT
Start: 2022-11-02

## 2022-11-14 LAB
ABSOLUTE BASOPHILS: 54 CELLS/UL (ref 0–200)
ABSOLUTE EOSINOPHILS: 208 CELLS/UL (ref 15–500)
ABSOLUTE LYMPHOCYTES: 1502 CELLS/UL (ref 850–3900)
ABSOLUTE MONOCYTES: 578 CELLS/UL (ref 200–950)
ABSOLUTE NEUTROPHILS: 5359 CELLS/UL (ref 1500–7800)
ALBUMIN/GLOBULIN RATIO: 1.8 (CALC) (ref 1–2.5)
ALBUMIN: 4.2 G/DL (ref 3.6–5.1)
ALKALINE PHOSPHATASE: 69 U/L (ref 35–144)
ALT: 16 U/L (ref 9–46)
AST: 15 U/L (ref 10–35)
BASOPHILS: 0.7 %
BILIRUBIN, TOTAL: 0.6 MG/DL (ref 0.2–1.2)
BUN: 16 MG/DL (ref 7–25)
CALCIUM: 9.2 MG/DL (ref 8.6–10.3)
CARBON DIOXIDE: 28 MMOL/L (ref 20–32)
CHLORIDE: 103 MMOL/L (ref 98–110)
CHOL/HDLC RATIO: 2.5 (CALC)
CHOLESTEROL, TOTAL: 117 MG/DL
CREATININE: 0.81 MG/DL (ref 0.7–1.28)
EGFR: 90 ML/MIN/1.73M2
EOSINOPHILS: 2.7 %
GLOBULIN: 2.4 G/DL (CALC) (ref 1.9–3.7)
GLUCOSE: 150 MG/DL (ref 65–99)
HDL CHOLESTEROL: 47 MG/DL
HEMATOCRIT: 43.9 % (ref 38.5–50)
HEMOGLOBIN: 14.5 G/DL (ref 13.2–17.1)
LDL-CHOLESTEROL: 49 MG/DL (CALC)
LYMPHOCYTES: 19.5 %
MCH: 31.8 PG (ref 27–33)
MCHC: 33 G/DL (ref 32–36)
MCV: 96.3 FL (ref 80–100)
MONOCYTES: 7.5 %
MPV: 13.5 FL (ref 7.5–12.5)
NEUTROPHILS: 69.6 %
NON-HDL CHOLESTEROL: 70 MG/DL (CALC)
PLATELET COUNT: 134 THOUSAND/UL (ref 140–400)
POTASSIUM: 4.7 MMOL/L (ref 3.5–5.3)
PROTEIN, TOTAL: 6.6 G/DL (ref 6.1–8.1)
RDW: 13 % (ref 11–15)
RED BLOOD CELL COUNT: 4.56 MILLION/UL (ref 4.2–5.8)
SODIUM: 140 MMOL/L (ref 135–146)
T4, FREE: 1.6 NG/DL (ref 0.8–1.8)
TOTAL PSA: 0.6 NG/ML
TRIGLYCERIDES: 126 MG/DL
TSH: 2.91 MIU/L (ref 0.4–4.5)
WHITE BLOOD CELL COUNT: 7.7 THOUSAND/UL (ref 3.8–10.8)

## 2022-11-28 RX ORDER — FUROSEMIDE 20 MG/1
TABLET ORAL
Qty: 90 TABLET | Refills: 0 | Status: SHIPPED | OUTPATIENT
Start: 2022-11-28

## 2022-12-02 LAB — AMB EXT HGBA1C: 7.1 %

## 2022-12-29 RX ORDER — TAMSULOSIN HYDROCHLORIDE 0.4 MG/1
CAPSULE ORAL
Qty: 180 CAPSULE | Refills: 3 | Status: SHIPPED | OUTPATIENT
Start: 2022-12-29

## 2022-12-29 NOTE — TELEPHONE ENCOUNTER
Requesting Tamsulosin 0.4mg  LOV: 10/24/22 Physical  RTC: not noted  Last Relevant Labs: 11/12/22  Filled: 11/7/21 #180 with 3 refills    No future appointments.     Non-protocol med:  Rx pended and routed for approval/denial

## 2023-01-09 ENCOUNTER — TELEPHONE (OUTPATIENT)
Dept: FAMILY MEDICINE CLINIC | Facility: CLINIC | Age: 78
End: 2023-01-09

## 2023-01-16 NOTE — TELEPHONE ENCOUNTER
Jos Castelan from [facility] called and said Ida needs a refill on Furoseide 20mg. Jos Castelan can be reached at [phone] #2 with reference # [id number].

## 2023-01-19 ENCOUNTER — TELEPHONE (OUTPATIENT)
Dept: FAMILY MEDICINE CLINIC | Facility: CLINIC | Age: 78
End: 2023-01-19

## 2023-01-19 DIAGNOSIS — Z12.5 PROSTATE CANCER SCREENING: Primary | ICD-10-CM

## 2023-01-19 RX ORDER — FUROSEMIDE 20 MG/1
20 TABLET ORAL DAILY
Qty: 90 TABLET | Refills: 0 | Status: SHIPPED | OUTPATIENT
Start: 2023-01-19

## 2023-01-19 NOTE — TELEPHONE ENCOUNTER
PSA total with Reflex was ordered, insurance will not cover without a DX. PSA screening should be ordered.    Need new DX

## 2023-02-06 ENCOUNTER — TELEPHONE (OUTPATIENT)
Dept: FAMILY MEDICINE CLINIC | Facility: CLINIC | Age: 78
End: 2023-02-06

## 2023-02-06 RX ORDER — PREGABALIN 75 MG/1
75 CAPSULE ORAL DAILY
Qty: 90 CAPSULE | Refills: 1 | OUTPATIENT
Start: 2023-02-06

## 2023-02-06 RX ORDER — TAMSULOSIN HYDROCHLORIDE 0.4 MG/1
0.8 CAPSULE ORAL DAILY
Qty: 180 CAPSULE | Refills: 0 | Status: SHIPPED | OUTPATIENT
Start: 2023-02-06

## 2023-02-06 NOTE — TELEPHONE ENCOUNTER
Requesting Pregabalin also. No future appointments.   LOV: 10/24/22 for wellness visit      -medications pended

## 2023-02-06 NOTE — TELEPHONE ENCOUNTER
Recd request from [address] [place] [address] for new prescription for Tamsulosin Hydrochloride 0.4 mg cap.

## 2023-02-09 ENCOUNTER — TELEPHONE (OUTPATIENT)
Dept: FAMILY MEDICINE CLINIC | Facility: CLINIC | Age: 78
End: 2023-02-09

## 2023-02-09 DIAGNOSIS — Z79.4 TYPE 2 DIABETES MELLITUS WITH HYPERGLYCEMIA, WITH LONG-TERM CURRENT USE OF INSULIN (HCC): Primary | ICD-10-CM

## 2023-02-09 DIAGNOSIS — E11.65 TYPE 2 DIABETES MELLITUS WITH HYPERGLYCEMIA, WITH LONG-TERM CURRENT USE OF INSULIN (HCC): Primary | ICD-10-CM

## 2023-02-09 DIAGNOSIS — E55.9 VITAMIN D DEFICIENCY: ICD-10-CM

## 2023-02-09 NOTE — TELEPHONE ENCOUNTER
Ask patient if he had vit d level checked in last 6 months. If not can order level.  Ask if he had low vit d in the past.

## 2023-02-09 NOTE — TELEPHONE ENCOUNTER
Dr.Dongre- Alex is calling to follow up on rx for Vit D.    [facility] [address], [phone], [phone]

## 2023-02-11 ENCOUNTER — OFFICE VISIT (OUTPATIENT)
Dept: FAMILY MEDICINE CLINIC | Facility: CLINIC | Age: 78
End: 2023-02-11
Payer: MEDICARE

## 2023-02-11 VITALS
DIASTOLIC BLOOD PRESSURE: 62 MMHG | TEMPERATURE: 97 F | SYSTOLIC BLOOD PRESSURE: 130 MMHG | RESPIRATION RATE: 16 BRPM | HEIGHT: 66 IN | HEART RATE: 81 BPM | BODY MASS INDEX: 32.95 KG/M2 | OXYGEN SATURATION: 98 % | WEIGHT: 205 LBS

## 2023-02-11 DIAGNOSIS — M54.50 ACUTE BILATERAL LOW BACK PAIN WITHOUT SCIATICA: Primary | ICD-10-CM

## 2023-02-11 PROCEDURE — 3008F BODY MASS INDEX DOCD: CPT | Performed by: FAMILY MEDICINE

## 2023-02-11 PROCEDURE — 99214 OFFICE O/P EST MOD 30 MIN: CPT | Performed by: FAMILY MEDICINE

## 2023-02-11 PROCEDURE — 3075F SYST BP GE 130 - 139MM HG: CPT | Performed by: FAMILY MEDICINE

## 2023-02-11 PROCEDURE — 3078F DIAST BP <80 MM HG: CPT | Performed by: FAMILY MEDICINE

## 2023-02-15 LAB — VITAMIN D, 25-OH, TOTAL: 108 NG/ML (ref 30–100)

## 2023-02-16 ENCOUNTER — TELEPHONE (OUTPATIENT)
Dept: FAMILY MEDICINE CLINIC | Facility: CLINIC | Age: 78
End: 2023-02-16

## 2023-04-05 RX ORDER — ATENOLOL 25 MG/1
25 TABLET ORAL DAILY
Qty: 90 TABLET | Refills: 0 | Status: SHIPPED | OUTPATIENT
Start: 2023-04-05

## 2023-05-17 RX ORDER — EMPAGLIFLOZIN 25 MG/1
1 TABLET, FILM COATED ORAL DAILY
Qty: 90 TABLET | Refills: 0 | Status: SHIPPED | OUTPATIENT
Start: 2023-05-17

## 2023-05-17 RX ORDER — FUROSEMIDE 20 MG/1
20 TABLET ORAL DAILY
Qty: 90 TABLET | Refills: 0 | Status: SHIPPED | OUTPATIENT
Start: 2023-05-17

## 2023-07-12 ENCOUNTER — OFFICE VISIT (OUTPATIENT)
Dept: FAMILY MEDICINE CLINIC | Facility: CLINIC | Age: 78
End: 2023-07-12
Payer: MEDICARE

## 2023-07-12 VITALS
HEIGHT: 66 IN | BODY MASS INDEX: 32.95 KG/M2 | DIASTOLIC BLOOD PRESSURE: 78 MMHG | OXYGEN SATURATION: 96 % | HEART RATE: 63 BPM | SYSTOLIC BLOOD PRESSURE: 122 MMHG | TEMPERATURE: 98 F | WEIGHT: 205 LBS | RESPIRATION RATE: 14 BRPM

## 2023-07-12 DIAGNOSIS — Z79.4 TYPE 2 DIABETES MELLITUS WITHOUT COMPLICATION, WITH LONG-TERM CURRENT USE OF INSULIN (HCC): ICD-10-CM

## 2023-07-12 DIAGNOSIS — E11.9 TYPE 2 DIABETES MELLITUS WITHOUT COMPLICATION, WITH LONG-TERM CURRENT USE OF INSULIN (HCC): ICD-10-CM

## 2023-07-12 DIAGNOSIS — D69.6 THROMBOCYTOPENIA (HCC): Chronic | ICD-10-CM

## 2023-07-12 DIAGNOSIS — I10 ESSENTIAL HYPERTENSION, BENIGN: ICD-10-CM

## 2023-07-12 DIAGNOSIS — E11.42 POLYNEUROPATHY DUE TO TYPE 2 DIABETES MELLITUS (HCC): ICD-10-CM

## 2023-07-12 DIAGNOSIS — E66.09 CLASS 1 OBESITY DUE TO EXCESS CALORIES WITHOUT SERIOUS COMORBIDITY WITH BODY MASS INDEX (BMI) OF 33.0 TO 33.9 IN ADULT: ICD-10-CM

## 2023-07-12 DIAGNOSIS — I27.20 PULMONARY HTN (HCC): ICD-10-CM

## 2023-07-12 DIAGNOSIS — Z00.00 ROUTINE GENERAL MEDICAL EXAMINATION AT A HEALTH CARE FACILITY: Primary | ICD-10-CM

## 2023-07-12 DIAGNOSIS — E78.2 MIXED HYPERLIPIDEMIA: ICD-10-CM

## 2023-07-12 DIAGNOSIS — I25.10 ATHEROSCLEROSIS OF CORONARY ARTERY OF NATIVE HEART WITHOUT ANGINA PECTORIS, UNSPECIFIED VESSEL OR LESION TYPE: ICD-10-CM

## 2023-07-12 PROCEDURE — 96160 PT-FOCUSED HLTH RISK ASSMT: CPT | Performed by: FAMILY MEDICINE

## 2023-07-12 PROCEDURE — 1159F MED LIST DOCD IN RCRD: CPT | Performed by: FAMILY MEDICINE

## 2023-07-12 PROCEDURE — 3008F BODY MASS INDEX DOCD: CPT | Performed by: FAMILY MEDICINE

## 2023-07-12 PROCEDURE — 1170F FXNL STATUS ASSESSED: CPT | Performed by: FAMILY MEDICINE

## 2023-07-12 PROCEDURE — 1126F AMNT PAIN NOTED NONE PRSNT: CPT | Performed by: FAMILY MEDICINE

## 2023-07-12 PROCEDURE — 3074F SYST BP LT 130 MM HG: CPT | Performed by: FAMILY MEDICINE

## 2023-07-12 PROCEDURE — 3078F DIAST BP <80 MM HG: CPT | Performed by: FAMILY MEDICINE

## 2023-07-12 PROCEDURE — G0439 PPPS, SUBSEQ VISIT: HCPCS | Performed by: FAMILY MEDICINE

## 2023-07-17 PROBLEM — E66.811 CLASS 1 OBESITY DUE TO EXCESS CALORIES WITHOUT SERIOUS COMORBIDITY WITH BODY MASS INDEX (BMI) OF 33.0 TO 33.9 IN ADULT: Status: ACTIVE | Noted: 2019-07-15

## 2023-07-17 PROBLEM — E66.09 CLASS 1 OBESITY DUE TO EXCESS CALORIES WITHOUT SERIOUS COMORBIDITY WITH BODY MASS INDEX (BMI) OF 33.0 TO 33.9 IN ADULT: Status: ACTIVE | Noted: 2019-07-15

## 2023-07-17 PROBLEM — E66.9 OBESITY WITH SERIOUS COMORBIDITY: Status: RESOLVED | Noted: 2019-07-15 | Resolved: 2023-07-17

## 2023-08-02 RX ORDER — LANCETS
1 EACH MISCELLANEOUS 2 TIMES DAILY
Qty: 100 EACH | Refills: 5 | Status: SHIPPED | OUTPATIENT
Start: 2023-08-02

## 2023-08-02 NOTE — TELEPHONE ENCOUNTER
Fax received from [facility] requesting refill on Accu-chek.     Future Appointments   Date Time Provider Agatha Hogan   11/6/2023  1:00 PM Shivam Dwyer MD [facility] [address] [facility] [address]     LOV: cpx: 7/12/23  Last r/f: reported externally 4/7/21  Labs: A1c: 5/24/23       Please advise

## 2023-08-07 RX ORDER — BLOOD SUGAR DIAGNOSTIC
1 STRIP MISCELLANEOUS 2 TIMES DAILY
Qty: 100 STRIP | Refills: 1 | Status: SHIPPED | OUTPATIENT
Start: 2023-08-07

## 2023-08-07 NOTE — TELEPHONE ENCOUNTER
Glucose Blood (ACCU-CHEK GUIDE) In Vitro Strip          Si strip by Finger stick route in the morning and 1 strip before bedtime. DX E11.9.     Disp: 100 strip    Refills: 1    Start: 2023    Class: Normal    Non-formulary         Diabetic Supplies Protocol [id number] 11:59 AM    Appointment in the past 12 or next 3 months      To be filled at: [facility] [address], [phone]

## 2023-08-07 NOTE — TELEPHONE ENCOUNTER
Pt requesting a refill order for Accu Chek test strips. Pt states he previously received these from endocrinologist but that provider has retired. Pt is out of these.     Warren Whitlock., [address] [place] [address] 59 [phone], [address] Phone: [phone] Fax: [phone]

## 2023-08-08 ENCOUNTER — TELEPHONE (OUTPATIENT)
Dept: FAMILY MEDICINE CLINIC | Facility: CLINIC | Age: 78
End: 2023-08-08

## 2023-08-08 RX ORDER — BLOOD SUGAR DIAGNOSTIC
1 STRIP MISCELLANEOUS 2 TIMES DAILY
Qty: 100 STRIP | Refills: 1 | Status: SHIPPED | OUTPATIENT
Start: 2023-08-08

## 2023-08-08 NOTE — TELEPHONE ENCOUNTER
Spoke with pharmacy, they need a new Rx with dx code and stating that pt is not on insulin sent over per Medicare.     New Rx sent

## 2023-08-08 NOTE — TELEPHONE ENCOUNTER
Loly Wooten from [facility] called and said they need to know if the pt in insulin dependant or not. She can be reached at [phone].

## 2023-08-09 LAB
% FREE PSA: 43 % (CALC)
ABSOLUTE BASOPHILS: 39 CELLS/UL (ref 0–200)
ABSOLUTE EOSINOPHILS: 177 CELLS/UL (ref 15–500)
ABSOLUTE LYMPHOCYTES: 1278 CELLS/UL (ref 850–3900)
ABSOLUTE MONOCYTES: 531 CELLS/UL (ref 200–950)
ABSOLUTE NEUTROPHILS: 5675 CELLS/UL (ref 1500–7800)
ALBUMIN/GLOBULIN RATIO: 1.5 (CALC) (ref 1–2.5)
ALBUMIN: 4.1 G/DL (ref 3.6–5.1)
ALKALINE PHOSPHATASE: 76 U/L (ref 35–144)
ALT: 15 U/L (ref 9–46)
AST: 15 U/L (ref 10–35)
BASOPHILS: 0.5 %
BILIRUBIN, TOTAL: 0.7 MG/DL (ref 0.2–1.2)
BUN: 18 MG/DL (ref 7–25)
CALCIUM: 8.8 MG/DL (ref 8.6–10.3)
CARBON DIOXIDE: 26 MMOL/L (ref 20–32)
CHLORIDE: 104 MMOL/L (ref 98–110)
CHOL/HDLC RATIO: 3.3 (CALC)
CHOLESTEROL, TOTAL: 147 MG/DL
CREATININE: 0.81 MG/DL (ref 0.7–1.28)
EGFR: 90 ML/MIN/1.73M2
EOSINOPHILS: 2.3 %
FREE PSA: 0.3 NG/ML
GLOBULIN: 2.8 G/DL (CALC) (ref 1.9–3.7)
GLUCOSE: 141 MG/DL (ref 65–99)
HDL CHOLESTEROL: 44 MG/DL
HEMATOCRIT: 43.8 % (ref 38.5–50)
HEMOGLOBIN: 14.4 G/DL (ref 13.2–17.1)
LDL-CHOLESTEROL: 78 MG/DL (CALC)
LYMPHOCYTES: 16.6 %
MCH: 31.4 PG (ref 27–33)
MCHC: 32.9 G/DL (ref 32–36)
MCV: 95.6 FL (ref 80–100)
MONOCYTES: 6.9 %
MPV: 13.2 FL (ref 7.5–12.5)
NEUTROPHILS: 73.7 %
NON-HDL CHOLESTEROL: 103 MG/DL (CALC)
PLATELET COUNT: 145 THOUSAND/UL (ref 140–400)
POTASSIUM: 4.2 MMOL/L (ref 3.5–5.3)
PROTEIN, TOTAL: 6.9 G/DL (ref 6.1–8.1)
RDW: 13.5 % (ref 11–15)
RED BLOOD CELL COUNT: 4.58 MILLION/UL (ref 4.2–5.8)
SODIUM: 140 MMOL/L (ref 135–146)
TOTAL PSA: 0.7 NG/ML
TRIGLYCERIDES: 146 MG/DL
TSH W/REFLEX TO FT4: 3.63 MIU/L (ref 0.4–4.5)
WHITE BLOOD CELL COUNT: 7.7 THOUSAND/UL (ref 3.8–10.8)

## 2023-08-21 RX ORDER — IRBESARTAN 150 MG/1
150 TABLET ORAL DAILY
Qty: 90 TABLET | Refills: 3 | Status: SHIPPED | OUTPATIENT
Start: 2023-08-21

## 2023-08-21 RX ORDER — PREGABALIN 75 MG/1
75 CAPSULE ORAL DAILY
Qty: 90 CAPSULE | Refills: 1 | Status: SHIPPED | OUTPATIENT
Start: 2023-08-21

## 2023-08-21 RX ORDER — TAMSULOSIN HYDROCHLORIDE 0.4 MG/1
0.8 CAPSULE ORAL DAILY
Qty: 180 CAPSULE | Refills: 0 | Status: SHIPPED | OUTPATIENT
Start: 2023-08-21

## 2023-08-21 RX ORDER — FUROSEMIDE 20 MG/1
20 TABLET ORAL DAILY
Qty: 90 TABLET | Refills: 0 | Status: SHIPPED | OUTPATIENT
Start: 2023-08-21

## 2023-08-21 NOTE — TELEPHONE ENCOUNTER
Requested Renewals     Name from pharmacy: TAMSULOSIN CAP 0.4MG         Will file in chart as: TAMSULOSIN 0.4 MG Oral Cap    Sig: TAKE 2 CAPSULES DAILY    Disp: 180 capsule    Refills: 0    Start: 8/19/2023    Class: Normal    Non-formulary    Last ordered: 6 months ago by Jj Sims MD Last refill: 2/7/2023    Rx #: [id number]        Name from pharmacy: [address] [email or web address] TAB 20MG         Will file in chart as: FUROSEMIDE 20 MG Oral Tab    Sig: TAKE 1 TABLET DAILY    Disp: 90 tablet    Refills: 0    Start: 8/19/2023    Class: Normal    Non-formulary          Future Appointments   Date Time Provider [facility]   11/8/2023 11:00 AM Mark Reese MD [facility] [address] [facility] [address]     LOV: 7/12/23 for physical exam  Labs done 8/8/23    -medications pended for review.

## 2023-08-21 NOTE — TELEPHONE ENCOUNTER
Fax received from [facility] requesting r/f on Pregabalin 75 mg cap, and Irbesartan 150 mg tabs    Future Appointments   Date Time Provider Agatha Hogan   11/8/2023 11:00 AM Nicki Negron MD [facility] [address] [facility] [address]     LOV: 2/11/23  Last r/f: 2/7/23 # 90 1 r/fs  Irbesartan: 4/25/22 #90 3 r/fs  Labs: 8/8/23     RTC: none    Please advise

## 2023-10-03 ENCOUNTER — OFFICE VISIT (OUTPATIENT)
Facility: CLINIC | Age: 78
End: 2023-10-03
Payer: MEDICARE

## 2023-10-03 VITALS
SYSTOLIC BLOOD PRESSURE: 134 MMHG | OXYGEN SATURATION: 100 % | BODY MASS INDEX: 33 KG/M2 | HEART RATE: 50 BPM | DIASTOLIC BLOOD PRESSURE: 72 MMHG | WEIGHT: 205 LBS

## 2023-10-03 DIAGNOSIS — E11.9 TYPE 2 DIABETES MELLITUS WITHOUT COMPLICATION, WITH LONG-TERM CURRENT USE OF INSULIN (HCC): ICD-10-CM

## 2023-10-03 DIAGNOSIS — Z79.4 TYPE 2 DIABETES MELLITUS WITHOUT COMPLICATION, WITH LONG-TERM CURRENT USE OF INSULIN (HCC): ICD-10-CM

## 2023-10-03 DIAGNOSIS — E78.2 MIXED HYPERLIPIDEMIA: Primary | ICD-10-CM

## 2023-10-03 LAB
CARTRIDGE LOT#: ABNORMAL NUMERIC
HEMOGLOBIN A1C: 7.8 % (ref 4.3–5.6)

## 2023-10-03 PROCEDURE — 99205 OFFICE O/P NEW HI 60 MIN: CPT | Performed by: STUDENT IN AN ORGANIZED HEALTH CARE EDUCATION/TRAINING PROGRAM

## 2023-10-03 PROCEDURE — 3078F DIAST BP <80 MM HG: CPT | Performed by: STUDENT IN AN ORGANIZED HEALTH CARE EDUCATION/TRAINING PROGRAM

## 2023-10-03 PROCEDURE — 83036 HEMOGLOBIN GLYCOSYLATED A1C: CPT | Performed by: STUDENT IN AN ORGANIZED HEALTH CARE EDUCATION/TRAINING PROGRAM

## 2023-10-03 PROCEDURE — 3075F SYST BP GE 130 - 139MM HG: CPT | Performed by: STUDENT IN AN ORGANIZED HEALTH CARE EDUCATION/TRAINING PROGRAM

## 2023-10-03 RX ORDER — ATORVASTATIN CALCIUM 40 MG/1
40 TABLET, FILM COATED ORAL NIGHTLY
Qty: 60 TABLET | Refills: 2 | Status: SHIPPED | OUTPATIENT
Start: 2023-10-03 | End: 2024-03-31

## 2023-10-03 NOTE — PATIENT INSTRUCTIONS
Return Visit   [ x ] Physician in 3 months   [  ] In person or video visit  [  ] In person only    [ x ] After visit summary   [  ] Placed labs/imaging. Labs are to be drawn at [address] and fasting. [  ] Central scheduling # for ultrasound/nuclear med/CT/MRI/DXA  [  ] Directions to 1st floor lab  [  ] Med rep info for:  [  ] Dental clearance form  [  ] Will need authorization for outside records  [  ] Give blood sugar log  [  ] Other: It was great meeting you today! Today we discussed your diabetes and thyroid:  -Your A1c was 7.8%. Your goal is 7-7.5%. Since you do not have a glucose log with you I will not make any changes at this time. Please inform me if you are having glucose levels of greater than 250 or less than 70  -Continue your current insulin doses. Please also continue Metformin and Jardiance  -Please bring your blood glucose log with you at your follow up visit  -I will also reach out to our diabetic educator   -I have also increased your atorvastatin to 40mg from 20mg. Your LDL is 78 (cholesterol) and your goal is less than 70.  If you note muscle aches then please reduce your dose back to 20mg and alert the clinic    Take care!  -Dr. Pascual Balderas

## 2023-10-04 ENCOUNTER — TELEPHONE (OUTPATIENT)
Facility: CLINIC | Age: 78
End: 2023-10-04

## 2023-10-04 ENCOUNTER — HOSPITAL ENCOUNTER (EMERGENCY)
Age: 78
Discharge: HOME | End: 2023-10-04
Attending: EMERGENCY MEDICINE

## 2023-10-04 ENCOUNTER — APPOINTMENT (OUTPATIENT)
Dept: CT IMAGING | Age: 78
End: 2023-10-04
Attending: EMERGENCY MEDICINE

## 2023-10-04 ENCOUNTER — APPOINTMENT (OUTPATIENT)
Dept: MRI IMAGING | Age: 78
End: 2023-10-04
Attending: EMERGENCY MEDICINE

## 2023-10-04 VITALS
HEART RATE: 64 BPM | OXYGEN SATURATION: 98 % | BODY MASS INDEX: 33 KG/M2 | RESPIRATION RATE: 16 BRPM | DIASTOLIC BLOOD PRESSURE: 87 MMHG | TEMPERATURE: 98 F | SYSTOLIC BLOOD PRESSURE: 170 MMHG | WEIGHT: 205 LBS

## 2023-10-04 DIAGNOSIS — H81.10 BENIGN PAROXYSMAL POSITIONAL VERTIGO, UNSPECIFIED LATERALITY: Primary | ICD-10-CM

## 2023-10-04 LAB
ALBUMIN SERPL-MCNC: 3.5 G/DL (ref 3.4–5)
ALBUMIN/GLOB SERPL: 0.9 {RATIO} (ref 1–2)
ALP LIVER SERPL-CCNC: 103 U/L
ALT SERPL-CCNC: 28 U/L
ANION GAP SERPL CALC-SCNC: 4 MMOL/L (ref 0–18)
AST SERPL-CCNC: 14 U/L (ref 15–37)
ATRIAL RATE: 65 BPM
BASOPHILS # BLD AUTO: 0.03 X10(3) UL (ref 0–0.2)
BASOPHILS NFR BLD AUTO: 0.3 %
BILIRUB SERPL-MCNC: 0.4 MG/DL (ref 0.1–2)
BUN BLD-MCNC: 19 MG/DL (ref 7–18)
CALCIUM BLD-MCNC: 8.9 MG/DL (ref 8.5–10.1)
CHLORIDE SERPL-SCNC: 106 MMOL/L (ref 98–112)
CO2 SERPL-SCNC: 27 MMOL/L (ref 21–32)
CREAT BLD-MCNC: 0.92 MG/DL
EGFRCR SERPLBLD CKD-EPI 2021: 85 ML/MIN/1.73M2 (ref 60–?)
EOSINOPHIL # BLD AUTO: 0.19 X10(3) UL (ref 0–0.7)
EOSINOPHIL NFR BLD AUTO: 2.2 %
ERYTHROCYTE [DISTWIDTH] IN BLOOD BY AUTOMATED COUNT: 13 %
GLOBULIN PLAS-MCNC: 3.8 G/DL (ref 2.8–4.4)
GLUCOSE BLD-MCNC: 154 MG/DL (ref 70–99)
HCT VFR BLD AUTO: 42.1 %
HGB BLD-MCNC: 14.3 G/DL
IMM GRANULOCYTES # BLD AUTO: 0.03 X10(3) UL (ref 0–1)
IMM GRANULOCYTES NFR BLD: 0.3 %
LYMPHOCYTES # BLD AUTO: 1.13 X10(3) UL (ref 1–4)
LYMPHOCYTES NFR BLD AUTO: 12.9 %
MCH RBC QN AUTO: 32.5 PG (ref 26–34)
MCHC RBC AUTO-ENTMCNC: 34 G/DL (ref 31–37)
MCV RBC AUTO: 95.7 FL
MONOCYTES # BLD AUTO: 0.68 X10(3) UL (ref 0.1–1)
MONOCYTES NFR BLD AUTO: 7.7 %
NEUTROPHILS # BLD AUTO: 6.72 X10 (3) UL (ref 1.5–7.7)
NEUTROPHILS # BLD AUTO: 6.72 X10(3) UL (ref 1.5–7.7)
NEUTROPHILS NFR BLD AUTO: 76.6 %
OSMOLALITY SERPL CALC.SUM OF ELEC: 289 MOSM/KG (ref 275–295)
P AXIS: 78 DEGREES
P-R INTERVAL: 196 MS
PLATELET # BLD AUTO: 149 10(3)UL (ref 150–450)
POTASSIUM SERPL-SCNC: 4.2 MMOL/L (ref 3.5–5.1)
PROT SERPL-MCNC: 7.3 G/DL (ref 6.4–8.2)
Q-T INTERVAL: 410 MS
QRS DURATION: 100 MS
QTC CALCULATION (BEZET): 426 MS
R AXIS: -4 DEGREES
RBC # BLD AUTO: 4.4 X10(6)UL
SODIUM SERPL-SCNC: 137 MMOL/L (ref 136–145)
T AXIS: 97 DEGREES
VENTRICULAR RATE: 65 BPM
WBC # BLD AUTO: 8.8 X10(3) UL (ref 4–11)

## 2023-10-04 PROCEDURE — 85025 COMPLETE CBC W/AUTO DIFF WBC: CPT | Performed by: EMERGENCY MEDICINE

## 2023-10-04 PROCEDURE — 70553 MRI BRAIN STEM W/O & W/DYE: CPT | Performed by: EMERGENCY MEDICINE

## 2023-10-04 PROCEDURE — 93005 ELECTROCARDIOGRAM TRACING: CPT

## 2023-10-04 PROCEDURE — 80053 COMPREHEN METABOLIC PANEL: CPT | Performed by: EMERGENCY MEDICINE

## 2023-10-04 PROCEDURE — 96361 HYDRATE IV INFUSION ADD-ON: CPT

## 2023-10-04 PROCEDURE — 93010 ELECTROCARDIOGRAM REPORT: CPT

## 2023-10-04 PROCEDURE — 99285 EMERGENCY DEPT VISIT HI MDM: CPT

## 2023-10-04 PROCEDURE — 70549 MR ANGIOGRAPH NECK W/O&W/DYE: CPT | Performed by: EMERGENCY MEDICINE

## 2023-10-04 PROCEDURE — 70450 CT HEAD/BRAIN W/O DYE: CPT | Performed by: EMERGENCY MEDICINE

## 2023-10-04 PROCEDURE — A9575 INJ GADOTERATE MEGLUMI 0.1ML: HCPCS | Performed by: EMERGENCY MEDICINE

## 2023-10-04 PROCEDURE — 96360 HYDRATION IV INFUSION INIT: CPT

## 2023-10-04 PROCEDURE — 70546 MR ANGIOGRAPH HEAD W/O&W/DYE: CPT | Performed by: EMERGENCY MEDICINE

## 2023-10-04 RX ORDER — MECLIZINE HYDROCHLORIDE 25 MG/1
25 TABLET ORAL ONCE
Status: COMPLETED | OUTPATIENT
Start: 2023-10-04 | End: 2023-10-04

## 2023-10-04 RX ORDER — MECLIZINE HYDROCHLORIDE 25 MG/1
25 TABLET ORAL 3 TIMES DAILY PRN
Qty: 30 TABLET | Refills: 0 | Status: SHIPPED | OUTPATIENT
Start: 2023-10-04

## 2023-10-04 RX ORDER — GADOTERATE MEGLUMINE 376.9 MG/ML
20 INJECTION INTRAVENOUS
Status: COMPLETED | OUTPATIENT
Start: 2023-10-04 | End: 2023-10-04

## 2023-10-04 RX ORDER — SODIUM CHLORIDE 9 MG/ML
125 INJECTION, SOLUTION INTRAVENOUS CONTINUOUS
Status: DISCONTINUED | OUTPATIENT
Start: 2023-10-04 | End: 2023-10-04

## 2023-10-04 NOTE — DISCHARGE INSTRUCTIONS
Follow-up for further evaluation primary physician. Return if new or worse symptoms. Rest, plenty of fluids. Meclizine as prescribed.

## 2023-10-04 NOTE — TELEPHONE ENCOUNTER
Placed call to patient to schedule with Diabetes Educator. Nutrition and lifestyle counseling.   Scheduled for 10/11 at 1:30 pm    [facility]

## 2023-10-06 ENCOUNTER — OFFICE VISIT (OUTPATIENT)
Dept: FAMILY MEDICINE CLINIC | Facility: CLINIC | Age: 78
End: 2023-10-06
Payer: MEDICARE

## 2023-10-06 ENCOUNTER — TELEPHONE (OUTPATIENT)
Dept: FAMILY MEDICINE CLINIC | Facility: CLINIC | Age: 78
End: 2023-10-06

## 2023-10-06 VITALS
HEART RATE: 62 BPM | BODY MASS INDEX: 32.72 KG/M2 | HEIGHT: 65 IN | SYSTOLIC BLOOD PRESSURE: 138 MMHG | DIASTOLIC BLOOD PRESSURE: 60 MMHG | WEIGHT: 196.38 LBS | OXYGEN SATURATION: 97 % | TEMPERATURE: 98 F

## 2023-10-06 DIAGNOSIS — M54.2 NECK PAIN: ICD-10-CM

## 2023-10-06 DIAGNOSIS — R42 VERTIGO: Primary | ICD-10-CM

## 2023-10-06 PROCEDURE — 3078F DIAST BP <80 MM HG: CPT | Performed by: FAMILY MEDICINE

## 2023-10-06 PROCEDURE — 3008F BODY MASS INDEX DOCD: CPT | Performed by: FAMILY MEDICINE

## 2023-10-06 PROCEDURE — 99213 OFFICE O/P EST LOW 20 MIN: CPT | Performed by: FAMILY MEDICINE

## 2023-10-06 PROCEDURE — 1159F MED LIST DOCD IN RCRD: CPT | Performed by: FAMILY MEDICINE

## 2023-10-06 PROCEDURE — 1160F RVW MEDS BY RX/DR IN RCRD: CPT | Performed by: FAMILY MEDICINE

## 2023-10-06 PROCEDURE — 3075F SYST BP GE 130 - 139MM HG: CPT | Performed by: FAMILY MEDICINE

## 2023-10-06 RX ORDER — POTASSIUM CHLORIDE 750 MG/1
TABLET, EXTENDED RELEASE ORAL
COMMUNITY
Start: 2023-09-15

## 2023-10-06 RX ORDER — ERGOCALCIFEROL 1.25 MG/1
CAPSULE ORAL
COMMUNITY
Start: 2023-07-04

## 2023-10-06 NOTE — TELEPHONE ENCOUNTER
Concepción Gonzales - Pt would like to know what dose he should take for Vit D?     Please call to advise [address]. [phone]

## 2023-10-09 ENCOUNTER — TELEPHONE (OUTPATIENT)
Dept: FAMILY MEDICINE CLINIC | Facility: CLINIC | Age: 78
End: 2023-10-09

## 2023-10-09 NOTE — TELEPHONE ENCOUNTER
He must give the message of what the problem is to the nurse for triage. Then I can see what to do. Let pt know that I am only in the office 1 day a week, he can see Dr. Jarad North sooner. Jan Edward, for my patients who need higher level of care or more frequent care, you can tell them that Dr. Deejay Breaux recommends they switch to another provider  who is in the office more often and can better meet the patient's needs.

## 2023-10-09 NOTE — TELEPHONE ENCOUNTER
Pt is concerned about his diabetes and BP. Pt is requesting to speak to Dr. Kristie Tapia asap, prior to his next scheduled appt 11/8/23. Please advise if Pt can be scheduled sooner w/Dr. Kristie Tapia.

## 2023-10-10 ENCOUNTER — TELEPHONE (OUTPATIENT)
Dept: FAMILY MEDICINE CLINIC | Facility: CLINIC | Age: 78
End: 2023-10-10

## 2023-10-10 NOTE — TELEPHONE ENCOUNTER
Called and spoke with pt.he was in last Wed. to see Taylor Mckeon for ER f/U appt. He was diagnosised with Vertigo. .saw ENT yesterday and has appt. with PT   Today for exercises to do to help his vertigo. .does not tolerated medication for vertigo, makes him too tired and weak. Dario Mckeon His concern now is his blood pressure readings since his ER visits, BP has been running higher than usual...(at visit with Taylor Mckeon BP was not addressed and it was normal in office?)  At home now his BP running 157/71, 155/78 and this am 179/83. .he says he always uses this bp machine and uses it correctly. .even in ER it was high. he denies any headache, blurred vision, chest pain or discomfort, and no SOB. Dario Mckeon Please advise. ...

## 2023-10-10 NOTE — TELEPHONE ENCOUNTER
Dr.Dongre- Alex is requesting a call to discuss BP. BP is running High for the last few days.     Please call [address] [phone]

## 2023-10-12 ENCOUNTER — TELEPHONE (OUTPATIENT)
Dept: FAMILY MEDICINE CLINIC | Facility: CLINIC | Age: 78
End: 2023-10-12

## 2023-10-12 NOTE — TELEPHONE ENCOUNTER
Vernon Samuels Pt is requesting a referral to get PT for Vertigo and stiff neck. Can order be faxed to [facility] on [address].    [address] [phone]  Fax [phone]

## 2023-10-12 NOTE — TELEPHONE ENCOUNTER
I did call patient and discuss his blood pressure readings with him. He had readings in the 150 range or so systolic and diastolic was slightly elevated. He has no symptoms of high blood pressure and will monitor for that and those were discussed in detail. He should have a follow-up in our office in about 2 weeks with Valerie Busing to recheck blood pressure. Previous blood pressure recently in office was in the 130 range. He should also bring his monitor so we can check the accuracy of it. He does state that when he was at GI doctor's office the blood pressure was also better than what his monitor was showing.

## 2023-10-13 ENCOUNTER — OFFICE VISIT (OUTPATIENT)
Dept: FAMILY MEDICINE CLINIC | Facility: CLINIC | Age: 78
End: 2023-10-13
Payer: MEDICARE

## 2023-10-13 VITALS
BODY MASS INDEX: 33.24 KG/M2 | WEIGHT: 199.5 LBS | SYSTOLIC BLOOD PRESSURE: 140 MMHG | DIASTOLIC BLOOD PRESSURE: 100 MMHG | TEMPERATURE: 97 F | HEIGHT: 65 IN

## 2023-10-13 DIAGNOSIS — I10 PRIMARY HYPERTENSION: Primary | ICD-10-CM

## 2023-10-13 DIAGNOSIS — R42 DIZZINESS AFTER EXTENSION OF NECK: ICD-10-CM

## 2023-10-13 PROCEDURE — 99214 OFFICE O/P EST MOD 30 MIN: CPT | Performed by: FAMILY MEDICINE

## 2023-10-13 PROCEDURE — 3008F BODY MASS INDEX DOCD: CPT | Performed by: FAMILY MEDICINE

## 2023-10-13 PROCEDURE — 1159F MED LIST DOCD IN RCRD: CPT | Performed by: FAMILY MEDICINE

## 2023-10-13 PROCEDURE — 1160F RVW MEDS BY RX/DR IN RCRD: CPT | Performed by: FAMILY MEDICINE

## 2023-10-13 PROCEDURE — 3080F DIAST BP >= 90 MM HG: CPT | Performed by: FAMILY MEDICINE

## 2023-10-13 PROCEDURE — 3077F SYST BP >= 140 MM HG: CPT | Performed by: FAMILY MEDICINE

## 2023-10-14 ENCOUNTER — TELEPHONE (OUTPATIENT)
Dept: FAMILY MEDICINE CLINIC | Facility: CLINIC | Age: 78
End: 2023-10-14

## 2023-10-14 DIAGNOSIS — E11.649 TYPE 2 DIABETES MELLITUS WITH HYPOGLYCEMIA WITHOUT COMA, WITH LONG-TERM CURRENT USE OF INSULIN (HCC): Primary | ICD-10-CM

## 2023-10-14 DIAGNOSIS — Z79.4 TYPE 2 DIABETES MELLITUS WITH HYPOGLYCEMIA WITHOUT COMA, WITH LONG-TERM CURRENT USE OF INSULIN (HCC): Primary | ICD-10-CM

## 2023-10-14 RX ORDER — BLOOD SUGAR DIAGNOSTIC
STRIP MISCELLANEOUS
Qty: 300 STRIP | Refills: 0 | Status: SHIPPED | OUTPATIENT
Start: 2023-10-14

## 2023-10-14 NOTE — TELEPHONE ENCOUNTER
Patient called on 10/14/2023 requesting refill on test strips for his glucometer be sent to his local pharmacy. He has been checking his plasma glucose more often due to hypoglycemia, currently checking plasma glucose 4-5 times a day, previously 3 times a day. Prescription for test strips sent to patient's local pharmacy as requested. FYI to PCP. Type 2 diabetes mellitus with hypoglycemia without coma, with long-term current use of insulin ([facility])  (primary encounter diagnosis)        Signed Prescriptions Disp Refills    Glucose Blood (ACCU-CHEK GUIDE) In Vitro Strip 300 strip 0     Sig: Test 4-5 times per day.   Diagnoses: E11.649 and Z79.4     Authorizing Provider: Edgard Lopez

## 2023-10-16 ENCOUNTER — PATIENT MESSAGE (OUTPATIENT)
Dept: FAMILY MEDICINE CLINIC | Facility: CLINIC | Age: 78
End: 2023-10-16

## 2023-10-16 DIAGNOSIS — R42 VERTIGO: Primary | ICD-10-CM

## 2023-10-17 ENCOUNTER — PATIENT MESSAGE (OUTPATIENT)
Facility: CLINIC | Age: 78
End: 2023-10-17

## 2023-10-18 NOTE — TELEPHONE ENCOUNTER
Yes, I will order a carotid ultrasound.     Please see if he can see another neurologist at [facility] and get a sooner appointment

## 2023-10-18 NOTE — TELEPHONE ENCOUNTER
Hello it should be 2 tabs BID (so 1000mg in AM and 1000mg in PM). Let me know if you need me to make any changes for his script. I am glad his sugar is otherwise controlled.  Thank you

## 2023-10-18 NOTE — TELEPHONE ENCOUNTER
RN sent patient a [facility] with Dr. Jiang Odor  response: \"Hello it should be 2 tabs BID (so 1000mg in AM and 1000mg in PM). Let me know if you need me to make any changes for his script. I am glad his sugar is otherwise controlled. Thank you\"    Pt to call office or send another [facility] if has further questions.

## 2023-10-18 NOTE — TELEPHONE ENCOUNTER
Patient sent a [facility]; pt unsure what dose of Metformin he should be taking. Per Dr. Marco Antonio Jimenes o.v. note dated 10/3/023- \"Current regimen for diabetes: Oral/Injectable medications: Jardiance 25mg, Metformin 1000mg BID\"    Same o.v. note also reads \"Current Medications- MetFORMIN HCl (GLUCOPHAGE) 500 MG Oral Tab, 1 tablet (500 mg total) 2 (two) times daily with meals\". Patient to see Diabetic Educator tomorrow 10/19/23. LOV-  10/3/23  RTC- 1/22/24    Dr. Jenaro Flor- please advise. Message routed to Dr. Jenaro Flor.

## 2023-10-19 ENCOUNTER — PATIENT MESSAGE (OUTPATIENT)
Facility: CLINIC | Age: 78
End: 2023-10-19

## 2023-10-19 ENCOUNTER — NURSE ONLY (OUTPATIENT)
Facility: CLINIC | Age: 78
End: 2023-10-19
Payer: MEDICARE

## 2023-10-19 DIAGNOSIS — Z79.4 TYPE 2 DIABETES MELLITUS WITH HYPOGLYCEMIA WITHOUT COMA, WITH LONG-TERM CURRENT USE OF INSULIN (HCC): ICD-10-CM

## 2023-10-19 DIAGNOSIS — E11.649 TYPE 2 DIABETES MELLITUS WITH HYPOGLYCEMIA WITHOUT COMA, WITH LONG-TERM CURRENT USE OF INSULIN (HCC): ICD-10-CM

## 2023-10-19 PROCEDURE — 99211 OFF/OP EST MAY X REQ PHY/QHP: CPT | Performed by: STUDENT IN AN ORGANIZED HEALTH CARE EDUCATION/TRAINING PROGRAM

## 2023-10-19 RX ORDER — BLOOD SUGAR DIAGNOSTIC
STRIP MISCELLANEOUS
Qty: 400 STRIP | Refills: 0 | Status: SHIPPED | OUTPATIENT
Start: 2023-10-19

## 2023-10-19 NOTE — PROGRESS NOTES
T2DM Nutrition and Lifestyle Counseling    Start Time: 12:46 pm  End Time:1:10 pm     Indication:  Patient seen by Endo for T2DM. Referred to Educator to review proper use of insulin and dietary management    Patient Concerns:  - Having low Bg every morning     Recent A1C:7.8%    Current Diabetes Medications:  Short acting 15 u with meals + scale  Long acting 60 u daily - morning after 10 am     Blood Sugar Intake  Checking 3-4 times daily --> always fasting, when eating and before sleeping - then whenver feels low    Today's Data:  Fastin  2Hr Breakfast: 167    Consistently low in the AM (<100), Highest reading in the last 2 weeks was 199 - is eating very heavy carb meals for dinner to avoid low, still waking up low        Nutrition Intake:    TYPICAL Food Notes   Breakfast  Chapati with Potato or toast w/ egg and jam    Lunch Stouffers heat up meal or salad with Jin dressing or hamburger or taco or frozen burrito (never fries)     Dinner Rice (1/2 cup) Chapati or ger (1/2 piece) 1 piece of naan with ko     [employer] of tea with buscuit    Drinks Water, diet pepsi sometimes        Nutrition Topics Discussed   - Discussed basic meal planning guidelines for diabetes regular mealtime, limited concentrated sweets.  Worked on establishing eating pattern/timing of meals and snacks    - Discussed in depth meal planning using the healthy eating with diabetes plate method with focus on balanced macronutrient consumption, including identifying foods that are carbohydrates, lean protein, non-starchy vegetables, and heart healthy fats   - Stressed importance of carbohydrate consistency in each meal   - Recommended carbohydrate targets of 45-60 grams at meals and 15-30 grams at snacks   - Educated on label reading   - Educated on portion control; including use of measuring cups, spoons, or food scale   - Provided suggestions for lower carb, healthy snacks   - Discussed how to handle special occasions, dining out, and eating on the go   - Discussed menu planning, healthy food shopping, cooking tips, and need to pre prepare foods    - Educated on emotional eating and being mindful at meals   - Emphasized the need for small, sustainable changes and working on [employer] goals to encourage sustainable behaviors    - Instructions on how to use helpful websites or nutrition/tracking apps reviewed    - If applicable, taught to use carbohydrate to insulin ratio and insulin sensitivity factor    - Discussed barriers and overcoming barriers to best achieve meal planning goals      Recommendations:  - Adjust Lantus dose and potentially meal time doses as well - sent to Provider    Follow Up:   With Dr. Wing Richmond on 11/27 at 2:30 pm    Willeen Dandy RN  Diabetes Educator

## 2023-10-21 NOTE — TELEPHONE ENCOUNTER
Name from pharmacy: TAMSULOSIN CAP 0.4MG          Will file in chart as: TAMSULOSIN 0.4 MG Oral Cap    Sig: TAKE 2 CAPSULES DAILY    Disp: 180 capsule    Refills: 0    Start: 10/20/2023    Class: Normal    Non-formulary    Last ordered: 2 months ago (8/21/2023) by Scooter Elkins MD    Last refill: 8/23/2023    Rx #: [id number]        Name from pharmacy: [facility] TAB 20MG         Will file in chart as: FUROSEMIDE 20 MG Oral Tab    Sig: TAKE 1 TABLET DAILY    Disp: 90 tablet    Refills: 0    Start: 10/20/2023    Class: Normal    Non-formulary    Last ordered: 2 months ago (8/21/2023) by Scooter Elkins MD    Last refill: 8/23/2023    Rx #: [id number]    Hypertension Medications Protocol Unbfgq65/20/2023 01:17 PM   Protocol Details CMP or BMP in past 12 months    Last serum creatinine< 2.0    Appointment in past 6 or next 3 months      To be filled at: [facility] [address], [phone], [phone]     Future Appointments   Date Time Provider Agatha Hogan   10/25/2023  7:30 PM PF US [facility]   11/8/2023 11:00 AM Karen Patricio MD [facility] [address] [facility]   11/16/2023  1:20 PM Nena Brown MD [facility] [address] [facility] [address]   11/27/2023  2:30 PM Anthony Caballero DO [id number] [address]     LOV: 10/13/23  Last r/f: 8/21/23 # 180 0 r/fs-Tamsulosin  Labs: cbc/cmp: 10/4/23     RTC: prn    Please advise

## 2023-10-23 RX ORDER — FUROSEMIDE 20 MG/1
20 TABLET ORAL DAILY
Qty: 90 TABLET | Refills: 0 | Status: SHIPPED | OUTPATIENT
Start: 2023-10-23

## 2023-10-23 RX ORDER — TAMSULOSIN HYDROCHLORIDE 0.4 MG/1
0.8 CAPSULE ORAL DAILY
Qty: 180 CAPSULE | Refills: 0 | Status: SHIPPED | OUTPATIENT
Start: 2023-10-23

## 2023-10-24 ENCOUNTER — TELEPHONE (OUTPATIENT)
Facility: CLINIC | Age: 78
End: 2023-10-24

## 2023-10-24 NOTE — TELEPHONE ENCOUNTER
Received fax from [facility] regarding RX for pt accu check test strips. Provider Prescribed test 4-5x/day. However patients insurance only allows for 3 strips daily per fax. Routing TE and placing fax in Dr Arce Orefield on desk for review and recommendations.

## 2023-10-25 ENCOUNTER — HOSPITAL ENCOUNTER (OUTPATIENT)
Dept: ULTRASOUND IMAGING | Age: 78
Discharge: HOME | End: 2023-10-25
Attending: FAMILY MEDICINE

## 2023-10-25 DIAGNOSIS — R42 VERTIGO: ICD-10-CM

## 2023-10-25 PROCEDURE — 93880 EXTRACRANIAL BILAT STUDY: CPT | Performed by: FAMILY MEDICINE

## 2023-10-26 ENCOUNTER — TELEPHONE (OUTPATIENT)
Dept: FAMILY MEDICINE CLINIC | Facility: CLINIC | Age: 78
End: 2023-10-26

## 2023-10-26 NOTE — TELEPHONE ENCOUNTER
Pt called and said he can see his results but he wants someone to call him.  I told himas soon as doctor reads it he will be getting a call and as he is not in the office today.

## 2023-10-27 ENCOUNTER — TELEPHONE (OUTPATIENT)
Facility: CLINIC | Age: 78
End: 2023-10-27

## 2023-10-27 NOTE — TELEPHONE ENCOUNTER
Patient's existing Rx for Accu-check test strips-->sig: test 4-5 times daily. Office received fax from [address] [place] [address] reading \"Patient's benefit plan only allows for 3 strips daily. Dr. Scot Barrett completes and signed new order with sig: test 3 times daily (now). Quantity #270. Order faxed to [address] [place] [address]. Awaiting fax confirmation.

## 2023-11-01 ENCOUNTER — MED REC SCAN ONLY (OUTPATIENT)
Dept: FAMILY MEDICINE CLINIC | Facility: CLINIC | Age: 78
End: 2023-11-01

## 2023-11-08 ENCOUNTER — OFFICE VISIT (OUTPATIENT)
Dept: FAMILY MEDICINE CLINIC | Facility: CLINIC | Age: 78
End: 2023-11-08
Payer: MEDICARE

## 2023-11-08 VITALS
HEART RATE: 57 BPM | RESPIRATION RATE: 14 BRPM | HEIGHT: 65 IN | DIASTOLIC BLOOD PRESSURE: 82 MMHG | WEIGHT: 201 LBS | OXYGEN SATURATION: 97 % | BODY MASS INDEX: 33.49 KG/M2 | SYSTOLIC BLOOD PRESSURE: 102 MMHG

## 2023-11-08 DIAGNOSIS — R42 VERTIGO: ICD-10-CM

## 2023-11-08 DIAGNOSIS — E11.9 TYPE 2 DIABETES MELLITUS WITHOUT COMPLICATION, WITH LONG-TERM CURRENT USE OF INSULIN (HCC): Primary | ICD-10-CM

## 2023-11-08 DIAGNOSIS — Z79.4 TYPE 2 DIABETES MELLITUS WITHOUT COMPLICATION, WITH LONG-TERM CURRENT USE OF INSULIN (HCC): Primary | ICD-10-CM

## 2023-11-08 PROCEDURE — 3079F DIAST BP 80-89 MM HG: CPT | Performed by: FAMILY MEDICINE

## 2023-11-08 PROCEDURE — 99215 OFFICE O/P EST HI 40 MIN: CPT | Performed by: FAMILY MEDICINE

## 2023-11-08 PROCEDURE — 1160F RVW MEDS BY RX/DR IN RCRD: CPT | Performed by: FAMILY MEDICINE

## 2023-11-08 PROCEDURE — 3074F SYST BP LT 130 MM HG: CPT | Performed by: FAMILY MEDICINE

## 2023-11-08 PROCEDURE — 3008F BODY MASS INDEX DOCD: CPT | Performed by: FAMILY MEDICINE

## 2023-11-08 PROCEDURE — 1159F MED LIST DOCD IN RCRD: CPT | Performed by: FAMILY MEDICINE

## 2023-11-14 ENCOUNTER — OFFICE VISIT (OUTPATIENT)
Dept: NEUROLOGY | Facility: CLINIC | Age: 78
End: 2023-11-14
Payer: MEDICARE

## 2023-11-14 VITALS
HEART RATE: 53 BPM | RESPIRATION RATE: 16 BRPM | SYSTOLIC BLOOD PRESSURE: 142 MMHG | BODY MASS INDEX: 33 KG/M2 | DIASTOLIC BLOOD PRESSURE: 74 MMHG | WEIGHT: 201 LBS

## 2023-11-14 DIAGNOSIS — M47.22 OSTEOARTHRITIS OF SPINE WITH RADICULOPATHY, CERVICAL REGION: Primary | ICD-10-CM

## 2023-11-14 DIAGNOSIS — E11.42 POLYNEUROPATHY DUE TO TYPE 2 DIABETES MELLITUS (HCC): ICD-10-CM

## 2023-11-14 PROCEDURE — 99204 OFFICE O/P NEW MOD 45 MIN: CPT | Performed by: OTHER

## 2023-11-14 PROCEDURE — 1159F MED LIST DOCD IN RCRD: CPT | Performed by: OTHER

## 2023-11-14 PROCEDURE — 3078F DIAST BP <80 MM HG: CPT | Performed by: OTHER

## 2023-11-14 PROCEDURE — 3077F SYST BP >= 140 MM HG: CPT | Performed by: OTHER

## 2023-11-14 NOTE — PROGRESS NOTES
Pt states here for follow up from ED on 10/04/23 for vertigo.  Pt states having a pain in right shoulder that makes it difficult to sleep

## 2023-11-18 ENCOUNTER — PATIENT MESSAGE (OUTPATIENT)
Dept: FAMILY MEDICINE CLINIC | Facility: CLINIC | Age: 78
End: 2023-11-18

## 2023-11-22 NOTE — TELEPHONE ENCOUNTER
I'm assuming he is asking for another PCP to see in the office? ? If I am correct then we can provide him with Dr. Iona Mirza information.

## 2023-11-30 ENCOUNTER — TELEPHONE (OUTPATIENT)
Facility: CLINIC | Age: 78
End: 2023-11-30

## 2023-11-30 NOTE — TELEPHONE ENCOUNTER
RN phoned patient who confirmed that he does not use a Dexcom CGM. Patient states that he did order \"stockings\" from a company and that they took Dr. Silas Quintero information. RN to call and confirm this is not the same order. Patient is going out of town 12/11-12/31- wanting letter from Dr. Susan Dawkins saying he is carrying diabetic medication and supplies for travel    Patient states he is getting labs done at [facility] in [place]. RN faxed [facility] lab orders [phone], in anticipation that [facility] may be unable to see orders in their system. RN phoned Shakeel Salcido at [phone] to no answer, was directed to [facility] with patient information and call back number. Will await call.

## 2023-12-03 LAB
CREATININE, RANDOM URINE: 59 MG/DL (ref 20–320)
MICROALBUMIN/CREATININE RATIO, RANDOM URINE: 151 MCG/MG CREAT
MICROALBUMIN: 8.9 MG/DL
T4, FREE: 1.2 NG/DL (ref 0.8–1.8)
TSH: 2.74 MIU/L (ref 0.4–4.5)

## 2023-12-04 ENCOUNTER — TELEPHONE (OUTPATIENT)
Facility: CLINIC | Age: 78
End: 2023-12-04

## 2023-12-04 NOTE — TELEPHONE ENCOUNTER
Thank you. No changes at this time. Will discuss with him at follow up visit. I will send him a [facility] message. Thanks!

## 2023-12-06 ENCOUNTER — TELEPHONE (OUTPATIENT)
Facility: CLINIC | Age: 78
End: 2023-12-06

## 2023-12-06 NOTE — TELEPHONE ENCOUNTER
12/6/23 rcvd via fax from [facility] Results for:   Albumin/Creatinine Ratio, TSH and Free T4    Lab results not in patient chart    Fax in [address]

## 2023-12-07 ENCOUNTER — MED REC SCAN ONLY (OUTPATIENT)
Facility: CLINIC | Age: 78
End: 2023-12-07

## 2023-12-07 NOTE — TELEPHONE ENCOUNTER
Lab results seen in EPIC. Dr. Jacqui Muñoz sent response to patient via [facility]. This was read by patient. Closing this encounter.

## 2024-01-02 ENCOUNTER — OFFICE VISIT (OUTPATIENT)
Facility: CLINIC | Age: 79
End: 2024-01-02
Payer: MEDICARE

## 2024-01-02 VITALS
WEIGHT: 200 LBS | HEIGHT: 65 IN | BODY MASS INDEX: 33.32 KG/M2 | RESPIRATION RATE: 18 BRPM | OXYGEN SATURATION: 97 % | SYSTOLIC BLOOD PRESSURE: 128 MMHG | DIASTOLIC BLOOD PRESSURE: 72 MMHG | HEART RATE: 50 BPM

## 2024-01-02 DIAGNOSIS — E11.9 TYPE 2 DIABETES MELLITUS WITHOUT COMPLICATION, WITH LONG-TERM CURRENT USE OF INSULIN (HCC): Primary | ICD-10-CM

## 2024-01-02 DIAGNOSIS — Z79.4 TYPE 2 DIABETES MELLITUS WITHOUT COMPLICATION, WITH LONG-TERM CURRENT USE OF INSULIN (HCC): Primary | ICD-10-CM

## 2024-01-02 LAB
CARTRIDGE LOT#: ABNORMAL NUMERIC
HEMOGLOBIN A1C: 7.6 % (ref 4.3–5.6)

## 2024-01-02 PROCEDURE — 99215 OFFICE O/P EST HI 40 MIN: CPT | Performed by: STUDENT IN AN ORGANIZED HEALTH CARE EDUCATION/TRAINING PROGRAM

## 2024-01-02 PROCEDURE — 3074F SYST BP LT 130 MM HG: CPT | Performed by: STUDENT IN AN ORGANIZED HEALTH CARE EDUCATION/TRAINING PROGRAM

## 2024-01-02 PROCEDURE — 1160F RVW MEDS BY RX/DR IN RCRD: CPT | Performed by: STUDENT IN AN ORGANIZED HEALTH CARE EDUCATION/TRAINING PROGRAM

## 2024-01-02 PROCEDURE — 83036 HEMOGLOBIN GLYCOSYLATED A1C: CPT | Performed by: STUDENT IN AN ORGANIZED HEALTH CARE EDUCATION/TRAINING PROGRAM

## 2024-01-02 PROCEDURE — 1159F MED LIST DOCD IN RCRD: CPT | Performed by: STUDENT IN AN ORGANIZED HEALTH CARE EDUCATION/TRAINING PROGRAM

## 2024-01-02 PROCEDURE — 3008F BODY MASS INDEX DOCD: CPT | Performed by: STUDENT IN AN ORGANIZED HEALTH CARE EDUCATION/TRAINING PROGRAM

## 2024-01-02 PROCEDURE — 3078F DIAST BP <80 MM HG: CPT | Performed by: STUDENT IN AN ORGANIZED HEALTH CARE EDUCATION/TRAINING PROGRAM

## 2024-01-02 RX ORDER — BLOOD-GLUCOSE METER
1 EACH MISCELLANEOUS 2 TIMES DAILY
Qty: 1 KIT | Refills: 0 | Status: SHIPPED | OUTPATIENT
Start: 2024-01-02

## 2024-01-02 RX ORDER — BLOOD SUGAR DIAGNOSTIC
STRIP MISCELLANEOUS
Qty: 100 STRIP | Refills: 3 | Status: SHIPPED | OUTPATIENT
Start: 2024-01-02 | End: 2025-01-01

## 2024-01-02 NOTE — PATIENT INSTRUCTIONS
Return Visit   [ x ] Physician in 3 months   [  ] In person or video visit  [  ] In person only    [ x ] After visit summary   [ x ] Placed labs/imaging.    [  ] Central scheduling # for ultrasound/nuclear med/CT/MRI/DXA  [  ] Directions to 1st floor lab     It was great seeing you today!    Today we discussed your diabetes:  -We reviewed your glucose log and your labs  -Continue your current medications with Jardiance, Metformin, Apidra, and Lantus  -Your A1c today was 7% which is a decreased from 7.8% last visit. This is great news!  -We will plan to follow up in 3 labs.  -Please get your thyroid labs done prior to our follow up appointment  -If your glucose levels are consistently over 250 or less than 70, please call the clinic so we can make further adjustments to your regimen    Take care!  -Dr. Romano

## 2024-01-02 NOTE — PROGRESS NOTES
ENDOCRINOLOGY, DIABETES & METABOLISM    Name: Ida Burgess  MR: [id number]  Initial Date: 10/3/2023  Today's Date: 1/2/2024  Referring Physician: No ref. provider found    Subjective    HISTORY OF PRESENT ILLNESS:  Ida Burgess is a 78 year oldM  seen in consultation for his Type 2 Diabetes.    Diabetes was diagnosed 1986. 1/2023 A1c 7.8% >1/2023 7%  Polyuria/polydipsia:  No on lasix; gets up overnight around 2x  Blurred vision:  No  Initially started on oral medications and then eventually added on insulin    Diabetes Treatment history   Duration of therapy with insulin: For many years.    Current Regimen for diabetes:   Oral/Injectable medications: Jardiance 25mg, Metformin 1000mg BID   Basal:  Lantus 45 units qAM . Injects in abdomen  Prandial:  Apidra 14-15u with each meal and takes it prior to meal . Also on a 1:20 above 120 correction with a reverse scale if premeal glucose below 100. Injects in abdomen  Missed doses: very rare   Treatment history:   He stopped ozempic, bydureon and rybelsus due to GI upset  He stopped actos due to concern about bladder cancer  He stopped cycloset due to hypotension    Complications  Neuropathy: Yes: could be multifactorial with back pain .  Endorses numbness and feet Follows with neurology: No    Nephropathy: Yes: positive malb Last MCR: 151 12/2023 Follows with nephrology: No   Retinopathy: No Last Ophthalmology Visit: 2/2023  Follows with Dr. Friedman at [facility]; due for f/u 2/2024    CAD/ HLD: Yes: CAD and HLD  Status post stent placement On Statin: Yes: Atorvastatin 20mg LDL 78 8/2023   Hypertension: Yes:    On ACEi: Yes:   Irbesartan, Jardiance    Stroke/TIA: No On Aspirin: Yes: asa 81    Skin Ulcer/Infection: No     Follows with Podiatry: Yes: follows every 3 months. Last saw in December Last foot exam: Foot/Ankle Musculoskeletal Exam  Done with podiatry with good sensation  Denies history of peripheral vascular disease     TSH 3.63 8/2023, Cr 0.81 8/2023, Ca 8.8  8/2023, ALP 76 8/2023    Meal                           Recall                                                                          Breakfast (1030-11P)        toast, juice, eggs, bagels with cream cheese, parantha/omelette                          AM Snack                rare  Lunch(2-230P)            taco, steak with macaroni cheese  PM Snack(5P)             antoni with peaches or plums, dry gi  Dinner(8-830P)           rice, lentils, okra/bitter melon, spinach     Snack Before Bed? No   Soda/Juice Drinker? Yes: rarely has diet pepsi Daily Intake:3.5 large glasses of water   Number of restaurant or fast food meals/week:occassionally eats out  Exercise: stationary bike 2x/month. Does walk around in his house     Potential CI to medications:  GERD: No  UTI/urinary frequency:Yes: as above  H/o Pancreatitis: No  FHx of Thyroid cancer: No  CAD/CHF: Yes     Hpothyroidisim  Endorses compliance with his LT4 75g   States that he has been on this medication for very long time and it was started by his endocrinologist been on this for a long time    ALLERGIES, PAST MEDICAL HISTORY, SOCIAL HISTORY, FAMILY HISTORY reviewed and updated in Epic as appropriate 1/2/2024    CURRENT MEDICATIONS:    tamsulosin 0.4 MG Oral Cap Take 2 capsules (0.8 mg total) by mouth daily. 180 capsule 0    furosemide 20 MG Oral Tab Take 1 tablet (20 mg total) by mouth daily. 90 tablet 0    Glucose Blood (ACCU-CHEK GUIDE) In Vitro Strip Test 4-5 times per day.  Diagnoses: E11.649 and Z79.4 400 strip 0    KLOR-CON M10 10 MEQ Oral Tab CR       atorvastatin 40 MG Oral Tab Take 1 tablet (40 mg total) by mouth nightly. (Patient taking differently: Take 0.5 tablets (20 mg total) by mouth nightly.) 60 tablet 2    pregabalin 75 MG Oral Cap Take 1 capsule (75 mg total) by mouth daily. 90 capsule 1    Irbesartan 150 MG Oral Tab Take 1 tablet (150 mg total) by mouth daily. 90 tablet 3    Accu-Chek FastClix Lancets Does not apply Misc 1 Lancet  in the  morning and 1 Lancet before bedtime. Test glucose twice daily. 100 each 5    Empagliflozin (JARDIANCE) 25 MG Oral Tab Take 1 tablet by mouth daily. 90 tablet 0    atenolol 25 MG Oral Tab Take 1 tablet (25 mg total) by mouth daily. 90 tablet 0    aspirin 81 MG Oral Tab EC Take 1 tablet (81 mg total) by mouth daily.      Blood Glucose Monitoring Suppl (ACCU-CHEK GUIDE) w/Device Does not apply Kit 1 kit by Does not apply route.      Insulin Pen Needle 31G X 8 MM Does not apply Misc 4/day      Lancets Misc. (ACCU-CHEK FASTCLIX LANCET) Does not apply Kit       niacin 500 MG Oral Tab Take 1 tablet (500 mg total) by mouth daily.      Potassium Chloride ER 10 MEQ Oral Tab CR Take 1 tablet (10 mEq total) by mouth 2 (two) times daily. 180 tablet 0    Levothyroxine Sodium 75 MCG Oral Tab Take 1 tablet (75 mcg total) by mouth before breakfast.      Ferrous Sulfate 325 (65 FE) MG Oral Tab Take 1 tablet (325 mg total) by mouth daily with breakfast.      LANTUS SOLOSTAR 100 UNIT/ML Subcutaneous Solution Pen-injector 45 Units. 45 units each morning      APIDRA SOLOSTAR 100 UNIT/ML Subcutaneous Solution Pen-injector 15 Units in the morning, at noon, and at bedtime. 15 units each meal      Isosorbide Mononitrate ER (IMDUR) 30 MG Oral Tablet 24 Hr Take 1 tablet (30 mg total) by mouth daily.      MetFORMIN HCl (GLUCOPHAGE) 500 MG Oral Tab 1 tablet (500 mg total) 2 (two) times daily with meals.       REVIEW OF SYSTEMS: Pertinent positives documented above in   IObjective      PHYSICAL EXAMINATION  Wt Readings from Last 3 Encounters:   01/02/24 200 lb (90.7 kg)   11/14/23 201 lb (91.2 kg)   11/08/23 201 lb (91.2 kg)      BMI Readings from Last 3 Encounters:   01/02/24 33.28 kg/m²   11/14/23 33.45 kg/m²   11/08/23 33.45 kg/m²     Vital Signs:   Vitals:    01/02/24 1507   BP: 128/72   Pulse: 50   Resp: 18   SpO2: 97%   Weight: 200 lb (90.7 kg)   Height: 5' 5\" (1.651 m)     Body mass index is 33.28 kg/m².   General Appearance:  Alert, in  no acute distress, well developed, increased kyphosis, ambulates with cane  Eyes:  normal conjunctivae, sclera  Ears/Nose/Mouth/Throat/Neck:  normal hearing, normal speech and no palpable thyroid nodules  Respiratory:  breathing comfortably on room air, clear to auscultation bilaterally  Cardiovascular:  regular rate and rhythm, no murmurs, S3 or S4, mild bilateral peripheral edema  Psychiatric:  Oriented to person, place and time, appropriate mood & affect  Skin: Normal moisture and skin texture  Neuro: sensory grossly intact, motor grossly intact.  Ambulating with cane    Recent Labs: Independently reviewed labs on 1/2/20243 in [facility] under lab tab.  Interpretation: Goal A1c of 7 to 8%  Diabetic Labs:   Last A1c value was 7.8% done 10/3/2023.  A1c of 7.8% for October 3, 2023  Component      Latest Ref Rng 8/8/2023   Cholesterol, Total      <200 mg/dL 147    HDL Cholesterol      > OR = 40 mg/dL 44    Triglycerides      <150 mg/dL 146    LDL Cholesterol Calc      mg/dL (calc) 78    Chol/HDL Ratio      <5.0 (calc) 3.3    NON-HDL CHOLESTEROL      <130 mg/dL (calc) 103        Component      Latest Ref Rng 12/2/2023   CREATININE, RANDOM URINE      20 - 320 mg/dL 59    MICROALBUMIN      See Note: mg/dL 8.9    MICROALBUMIN/CREATININE RATIO, RANDOM URINE      <30 mcg/mg creat 151 (H)    TSH      0.40 - 4.50 mIU/L 2.74    T4,Free (Direct)      0.8 - 1.8 ng/dL 1.2          Positive microalbumin of 121 October 2022, 151 12/2023    Diabetic Health Maintenance:  Last saw eye clinic February 2023 without any retinopathy  Follows with podiatry and had foot exam this year of 2023    Glucose Log Review  Checks BG 4-5 times/day     Morning 143, 151, 141  Prelunch 176, 116  Predinner 84, 122, 156, 100, 126  Bedtime 171, 115, 185, 176, 136, 135, 100, 169, 105  He previously tried a CGM and he did not like it   Denies recent hypoglycemic episodes. Previously had recurrent episodes when he was on a higher dose of latnus. Sinced the  reduction of his insulin he notes significant improvement of his hypoglycemia    Radiology: No relevant imaging at this time        ASSESSMENT & PLAN  Ida Burgess is a 78 year old male seen in consultation for:    Controlled Type 2 Diabetes   Importance of better glucose control in preventing onset/progression of end-organ damage discussed, as well as, goals of therapy and clinical significance of A1c   We reviewed his blood glucose readings since last visit and with a stable A1c of 7% we will continue his current regimen  Goal A1c is 7 to 8% but ideally 7 to 7.5% with his history of cardiac disease and positive urine microalbumin  Our goal is to avoid hypoglycemia.  Patient denies any episodes of hypoglycemia over the last few months  Surveillance for Complications & Risks  oHgA1c (goal <7%): Yes, A1c of 7.0%    oRenal: microalbumin: Positive, last screened 12/2023  oBlood Pressure (goal <130/80):  Yes  oOptho: Eye screening (yearly): Yes, seen in 2/2023.   oNeuro: Encouraged proper footwear and regular visits with podiatry. Foot exam (yearly): yes   oCV: hx of HLD -> LDL 78 (goal: LDL<100),  on 8/2023   Taking ASA(eg, age >40, cigarette smoking, HTN, obesity, albuminuria, HLD, or a FHx of CAD) : Yes    Hypertension  Blood pressure 128/72 this visit  Continue irbesartan and Lasix and atenolol  We will defer to primary regarding blood pressure management    Hyperlipidemia  Patient states compliant with his atorvastatin 20 mg daily   Most recent LDL above goal at 78 8/2023  With his history of cardiac disease and diabetes, discussed increasing his atorvastatin to 40 mg daily at 10/2023 visit  Discussed side effects and patient noted to decrease his dose to 20 mg if he has any muscle weakness  Patient also informed clinic if he has any side effects on the higher dose of atorvastatin  Continue current regimen and plan for repeat labs 8/2024    Hypothyroidism   Continue levothyroxine 75 mcg daily  Recent TFTs  noted to be within range on 12/2023 with TSH 2.74  Patient is clinically euthyroid so we will continue above dose  Plan on repeat TFTs prior to follow-up appointment    Nutrition:   Vitamin B12, Vitamin D3 as needed  Recommend chewable multivitamin daily.   Advised whole food plant based diet, patient is primarily vegetarian       Interventions Discussed  Eat 3 meals per day Yes   Drink 64oz of water per day Yes  Utilize portion control strategies to reduce calorie intake Yes   Exercise goal: Yes  Goals: Promote healthy eating, routine exercise/activity.   1. Patient aware of the adverse effects of suboptimal glucose control and goals of therapy  2. Reinforced timing and compliance with medication, SMBG and routine follow up     The above assessment and plan was discussed with the patient. The patient noted understanding and agreement with the plan listed above. The patient is aware to contact the office if further concerns arise.  I have reviewed prior external note(s) from each unique source (PCP and other specialists), reviewed results/ordered further testing.      Visit Duration : A total of 40 minutes was spent today on obtaining history, reviewing pertinent labs, evaluating patient, providing multiple treatment options, reinforcing diet/exercise and compliance, and completing documentation.     Note to patient: The 21 Century Cures Act makes medical notes like these available to patients in the interest of transparency. However, be advised this is a medical document. It is intended as peer to peer communication. It is written in medical language and may contain abbreviations or verbiage that are unfamiliar. It may appear blunt or direct. Medical documents are intended to carry relevant information, facts as evident, and the clinical opinion of the practitioner.    Return in about 3 months (around 4/2/2024).     Niurka Romano DO  Endocrinology, Diabetes & Metabolism   1/2/2024

## 2024-01-05 ENCOUNTER — HOSPITAL ENCOUNTER (OUTPATIENT)
Dept: MRI IMAGING | Age: 79
Discharge: HOME | End: 2024-01-05
Attending: Other
Payer: MEDICARE

## 2024-01-05 DIAGNOSIS — M47.22 OSTEOARTHRITIS OF SPINE WITH RADICULOPATHY, CERVICAL REGION: ICD-10-CM

## 2024-01-05 PROCEDURE — 72141 MRI NECK SPINE W/O DYE: CPT | Performed by: OTHER

## 2024-01-08 DIAGNOSIS — M48.02 DEGENERATIVE CERVICAL SPINAL STENOSIS: Primary | ICD-10-CM

## 2024-01-11 RX ORDER — BLOOD SUGAR DIAGNOSTIC
STRIP MISCELLANEOUS
Qty: 300 STRIP | Refills: 0 | OUTPATIENT
Start: 2024-01-11

## 2024-01-11 NOTE — TELEPHONE ENCOUNTER
Requested Prescriptions     Refused Prescriptions Disp Refills    ACCU-CHEK GUIDE In Vitro Strip [Pharmacy Med Name: ACCU-CHEK GUIDE TEST STRIP] 300 strip 0     Sig: TEST 4-5X DAILY     Refused By: JACEY MONCADA     Reason for Refusal: Prescriber not at this practice     This is not Dr. Jean Baptiste's patient.  Looks like she refilled last script on a Saturday (possibly on call).  Refill declined and [id number] message sent to patient.

## 2024-01-15 NOTE — TELEPHONE ENCOUNTER
Cholesterol Medication Protocol Eupetl62/15/2024 11:50 AM   Protocol Details ALT < 80    ALT resulted within past year    Lipid panel within past 12 months    Appointment within past 12 or next 3 months

## 2024-01-16 RX ORDER — NIACIN 500 MG/1
500 TABLET, EXTENDED RELEASE ORAL NIGHTLY
Qty: 90 TABLET | Refills: 1 | Status: SHIPPED | OUTPATIENT
Start: 2024-01-16

## 2024-01-16 NOTE — TELEPHONE ENCOUNTER
Requested Renewals     Name from pharmacy: Niacin Er 500 Mg Tab Amne         Will file in chart as: NIACIN ER, ANTIHYPERLIPIDEMIC, 500 MG Oral Tab CR    Sig: TAKE 1 TABLET (500 MG PER TABLET) BY MOUTH AT BEDTIME.  TAKE WITH FOOD.    Disp: 90 tablet    Refills: 0    Start: 1/15/2024    Class: Normal    Non-formulary    To pharmacy: pt asked for refill    Last refill: 12/2/2017              Future Appointments   Date Time Provider Department Center   2/8/2024  1:00 PM Ashley Anna DO [facility] [address] [facility] [address]   4/4/2024  3:00 PM Niurka Romano DO [id number] [facility] [place]   5/28/2024  1:40 PM Laila Doyle MD [facility] [address] [facility] [address]     LOV: 11/8/23 for DM  RTC: 6 months  Labs done 8/8/23    -medication pended for review as this medication is external.

## 2024-01-18 ENCOUNTER — TELEPHONE (OUTPATIENT)
Dept: FAMILY MEDICINE CLINIC | Facility: CLINIC | Age: 79
End: 2024-01-18

## 2024-01-18 DIAGNOSIS — M54.2 NECK PAIN: ICD-10-CM

## 2024-01-18 DIAGNOSIS — M54.9 UPPER BACK PAIN: ICD-10-CM

## 2024-01-18 DIAGNOSIS — R42 VERTIGO: Primary | ICD-10-CM

## 2024-01-18 NOTE — TELEPHONE ENCOUNTER
- Pt is requesting PT order to also have PT on Upper back with Vertigo.  Pt will be going to [facility].  Fax [phone]  PH.[phone].    There is an order for Vertigo and neck.  Need to add upper back.    Please fax order once it is placed.  Fax [phone]

## 2024-02-07 ENCOUNTER — TELEPHONE (OUTPATIENT)
Facility: CLINIC | Age: 79
End: 2024-02-07

## 2024-02-07 NOTE — TELEPHONE ENCOUNTER
Received call from patient stating he is having higher sugars than normal.    States his Fasting sugars are now: 150 to 200 , used to be 103    Taking   45/46 units lantus in morning    - 16u-18u APIDRA before meals    States took 18U before dinner around 8:30pm last night, sugars this morning were 200.    RN spoke with [facility] DM educator, who states he can get on her scheduled.    Phoned patient back and scheduled for 2/8 9:00 am.     Routed to [facility] DM ed.

## 2024-02-07 NOTE — PROGRESS NOTES
Glucose Meter Review     Start Time: 8:55 am  End Time: 9:11 am    Indication:  Patient is experiencing higher FBG in the past few days     Current Diabetes Medication:  Lantus 45 u  Apidra 15-16 u TID + ISF 1:20 >120 (using scale)  Metformin 1000 mg BID  Jardiance 25 mg daily    Reviewed Meter Data for last 7 days:  Patient should be checking 1 x daily    2/8 --> 180  2/7 --> 200  2/6 --> 174  2/5 --> 193  2/4 --> 159  2/3 --> 190  2/2 --> 168    Interpretation:  Patient FBG elevated above 150 consistently, may need adjustment to Lantus    Patient Concerns:  - Unsure of Apidra is working well for him    Patient Self Made Adjustments:  none    Recommended medication changes/Plan:  - Increase Lantus to 48 u daily    - Check your blood sugar 2 hours after dinner on Tuesday and Thursday   - Check your blood sugar 2 hours after lunch opn Monday, Wednesday and Friday       Follow up:   With Madhavi via phone call on 2/16     Routed to provider for review.    Madhavi YU  BC-ADM

## 2024-02-08 ENCOUNTER — NURSE ONLY (OUTPATIENT)
Facility: CLINIC | Age: 79
End: 2024-02-08
Payer: MEDICARE

## 2024-02-08 DIAGNOSIS — E11.9 TYPE 2 DIABETES MELLITUS WITHOUT COMPLICATION, WITH LONG-TERM CURRENT USE OF INSULIN (HCC): Primary | ICD-10-CM

## 2024-02-08 DIAGNOSIS — Z79.4 TYPE 2 DIABETES MELLITUS WITHOUT COMPLICATION, WITH LONG-TERM CURRENT USE OF INSULIN (HCC): Primary | ICD-10-CM

## 2024-02-08 PROCEDURE — 99211 OFF/OP EST MAY X REQ PHY/QHP: CPT | Performed by: STUDENT IN AN ORGANIZED HEALTH CARE EDUCATION/TRAINING PROGRAM

## 2024-02-08 NOTE — PATIENT INSTRUCTIONS
- Increase Lantus to 48 u daily    - Check your blood sugar 2 hours after dinner on Tuesday and Thursday   - Check your blood sugar 2 hours after lunch opn Monday, Wednesday and Friday

## 2024-02-17 NOTE — TELEPHONE ENCOUNTER
Fax received from [employer] [facility] requesting r/f on Furosemide 20 mg tabs.    Future Appointments   Date Time Provider Department Center   2/26/2024  2:00 PM [facility] DIABETIC EDUCATOR ENDO EMG[facility]O [facility] [place]   2/28/2024  1:00 PM Ashley Anna,  [facility] [address] [facility] [address]   4/4/2024  3:00 PM Niurka Romano DO [id number] [facility] [place]   5/28/2024  1:40 PM Laila Doyle MD [facility] [address] [facility] [address]     LOV: 11/8/23  Last r/f: 10/23/23 # 90 0 r/fs  Labs: cbc/cmp: 10/4/23     RTC: 6 months    Please advise

## 2024-02-18 RX ORDER — FUROSEMIDE 20 MG/1
20 TABLET ORAL DAILY
Qty: 90 TABLET | Refills: 1 | Status: SHIPPED | OUTPATIENT
Start: 2024-02-18

## 2024-02-21 ENCOUNTER — TELEPHONE (OUTPATIENT)
Facility: CLINIC | Age: 79
End: 2024-02-21

## 2024-02-21 NOTE — TELEPHONE ENCOUNTER
Notice of Denial of Medicare Part D Prescription Drug Coverage  Rx Apidra Solostar   Denied this request under Medicare Part D because your medicare Part D drug plan was asked to cover the requested drug which has an additional coverage rule called step therapy. A step therapy rule is similar to a prior authorization. A step therapy requires that you you first try certain other drugs before your Medicare Part D drug will cover the requested drug.   Placed in [address]

## 2024-02-22 RX ORDER — INSULIN ASPART 100 [IU]/ML
INJECTION, SOLUTION INTRAVENOUS; SUBCUTANEOUS
Qty: 15 ML | Refills: 2 | Status: SHIPPED | OUTPATIENT
Start: 2024-02-22

## 2024-02-22 RX ORDER — INSULIN ASPART INJECTION 100 [IU]/ML
INJECTION, SOLUTION SUBCUTANEOUS
Refills: 0 | Status: CANCELLED | OUTPATIENT
Start: 2024-02-22

## 2024-02-22 NOTE — TELEPHONE ENCOUNTER
Apidra denied as [employer] is wanting to cover due to step theory-    Recommended alternatives: Fiasp flext ouch pen injector, Novolog flex touch pen injector    Routed prescriptions to Dr. Romano for review.

## 2024-02-22 NOTE — TELEPHONE ENCOUNTER
RN phoned patient to let him know Apidra no longer covered- patient verbalized understanding that it will now be Novolog.    Closing encounter

## 2024-02-23 ENCOUNTER — MED REC SCAN ONLY (OUTPATIENT)
Facility: CLINIC | Age: 79
End: 2024-02-23

## 2024-02-26 ENCOUNTER — TELEPHONE (OUTPATIENT)
Facility: CLINIC | Age: 79
End: 2024-02-26

## 2024-02-26 NOTE — TELEPHONE ENCOUNTER
Glucose Meter Review     Start Time: 12:30 pm  End Time: 12:25 pm     Indication:  Patient made Lantus adjustment at last visit with [facility]    Current Diabetes Medication:  - Lantus 48 u daily  - Apidra 15-16 u TID + ISF 1:20 >120 (using scale) -- usually 18 u per meal following scale   - Metformin 1000 mg BID  - Jardiance 25 mg daily    Reviewed Meter Data for last 7 days:  Patient should be checking 2x daily    2/26 --> 151  2/25 --> 149  2/24 --> 150  2/23 --> 161 after dinner 121  2/22 --> 174  after lunch 161, after dinner 83  2/21 --> 170 after dinner 182, 3:52 pm 92   2/20 --> 161  after lunch 92    Interpretation:  FBG levels remain elevated, after meal mostly to goal, sometimes lower    Patient Concerns:  - Apidra no longer covered, they cover Novolog but [employer] sent a letter that Novolog may not work as well as Apidra, patient would like to know what provider thinks     Recommended medication changes/Plan:  Routing to provider for review     Madhavi YU  BC-ADM

## 2024-02-26 NOTE — TELEPHONE ENCOUNTER
Since mealtime coverage is currently okay, will not make any changes since switching to Novolog.   Increase Lantus to 52 units daily based on above fasting glucose as long as compliance is confirmed on current dose.   Thank you!e

## 2024-02-28 ENCOUNTER — OFFICE VISIT (OUTPATIENT)
Dept: FAMILY MEDICINE CLINIC | Facility: CLINIC | Age: 79
End: 2024-02-28
Payer: MEDICARE

## 2024-02-28 VITALS
SYSTOLIC BLOOD PRESSURE: 104 MMHG | TEMPERATURE: 98 F | WEIGHT: 200 LBS | HEART RATE: 74 BPM | HEIGHT: 65 IN | OXYGEN SATURATION: 99 % | BODY MASS INDEX: 33.32 KG/M2 | RESPIRATION RATE: 16 BRPM | DIASTOLIC BLOOD PRESSURE: 68 MMHG

## 2024-02-28 DIAGNOSIS — Z00.00 ENCOUNTER FOR MEDICARE ANNUAL WELLNESS EXAM: Primary | ICD-10-CM

## 2024-02-28 DIAGNOSIS — E78.2 MIXED HYPERLIPIDEMIA: ICD-10-CM

## 2024-02-28 DIAGNOSIS — E11.42 POLYNEUROPATHY DUE TO TYPE 2 DIABETES MELLITUS (HCC): ICD-10-CM

## 2024-02-28 DIAGNOSIS — I27.20 PULMONARY HTN (HCC): ICD-10-CM

## 2024-02-28 DIAGNOSIS — I10 PRIMARY HYPERTENSION: ICD-10-CM

## 2024-02-28 DIAGNOSIS — E66.09 CLASS 1 OBESITY DUE TO EXCESS CALORIES WITHOUT SERIOUS COMORBIDITY WITH BODY MASS INDEX (BMI) OF 33.0 TO 33.9 IN ADULT: ICD-10-CM

## 2024-02-28 DIAGNOSIS — M47.22 OSTEOARTHRITIS OF SPINE WITH RADICULOPATHY, CERVICAL REGION: ICD-10-CM

## 2024-02-28 DIAGNOSIS — E11.9 TYPE 2 DIABETES MELLITUS WITHOUT COMPLICATION, WITH LONG-TERM CURRENT USE OF INSULIN (HCC): ICD-10-CM

## 2024-02-28 DIAGNOSIS — I25.10 ATHEROSCLEROSIS OF CORONARY ARTERY OF NATIVE HEART WITHOUT ANGINA PECTORIS, UNSPECIFIED VESSEL OR LESION TYPE: ICD-10-CM

## 2024-02-28 DIAGNOSIS — D69.6 THROMBOCYTOPENIA (HCC): Chronic | ICD-10-CM

## 2024-02-28 DIAGNOSIS — I10 ESSENTIAL HYPERTENSION, BENIGN: ICD-10-CM

## 2024-02-28 DIAGNOSIS — G95.89 MYELOMALACIA (HCC): ICD-10-CM

## 2024-02-28 DIAGNOSIS — Z79.4 TYPE 2 DIABETES MELLITUS WITHOUT COMPLICATION, WITH LONG-TERM CURRENT USE OF INSULIN (HCC): ICD-10-CM

## 2024-02-28 DIAGNOSIS — Z00.00 ENCOUNTER FOR ANNUAL HEALTH EXAMINATION: ICD-10-CM

## 2024-02-28 PROCEDURE — G0439 PPPS, SUBSEQ VISIT: HCPCS | Performed by: FAMILY MEDICINE

## 2024-02-28 PROCEDURE — 96160 PT-FOCUSED HLTH RISK ASSMT: CPT | Performed by: FAMILY MEDICINE

## 2024-02-28 NOTE — PROGRESS NOTES
Subjective:   Ida Burgess is a 78 year old male who presents for a Medicare Subsequent Annual Wellness visit (Pt already had Initial Annual Wellness) and scheduled follow up of multiple significant but stable problems.     Chief Complaint   Patient presents with    Well Adult     Medicare Wellness      Patient Active Problem List   Diagnosis    Essential hypertension  -Blood pressure is well-controlled currently taking furosemide 20 mg daily, irbesartan 150 mg daily, and atenolol 25 mg daily.     Hypercholesterolemia-well-controlled on atorvastatin 40 mg daily.    CAD (coronary artery disease)-history of CABG in 1996.  He is on beta-blocker, statin, irbesartan, Lasix.  He is under the care of cardiology.  Denies any chest pain or shortness of breath.    Type 2 diabetes mellitus without complication, with long-term current use of insulin ([facility])-  Well-controlled.  Recent A1c was 7.6.  His Lantus is now increased to 45 units nightly.  He is also taking Jardiance, metformin.  Microalbumin stable.  Normal kidney function.    Class 1 obesity due to excess calories without serious comorbidity with body mass index (BMI) of 33.0 to 33.9 in adult    Pulmonary HTN (HCC)-patient on tamsulosin    Thrombocytopenia (HCC)-stable    Polyneuropathy due to type 2 diabetes mellitus (HCC)-stable    Osteoarthritis of spine with radiculopathy, cervical region-MRI revealed degenerative changes in the cervical spine and there was possible edema or myelomalacia noted.  He does have symptoms of motor disturbance, gait abnormality, weakness.  Denies any trouble with breathing.  Ambulating with a cane.  Patient has not seen neurosurgery for this.       Patient is seen neurology.  He had a bout of vertigo couple months ago and is undergoing physical therapy for treatment of this.  CTA and MRI brain were unremarkable in October 2023 for his history of vertigo.  Vertigo is much better since starting physical therapy.  US carotid- mild plaque  bilaterally  Using hearing aids but hard to hear words - getting new hearing aids in the summer.       History/Other:   Fall Risk Assessment:   He has been screened for Falls and is High Risk. Fall Prevention information provided to patient in After Visit Summary.    Do you feel unsteady when standing or walking?: Yes  Do you worry about falling?: Yes  Have you fallen in the past year?: No     Cognitive Assessment:   Abnormal  What day of the week is this?: Correct  What month is it?: Correct  What year is it?: Correct  Recall \"Ball\": Correct  Recall \"Flag\": Correct  Recall \"Tree\": Incorrect    Functional Ability/Status:   Ida Burgess has some abnormal functions as listed below:  He has Hearing problems based on screening of functional status.He has Walking problems based on screening of functional status.       Depression Screening (PHQ-2/PHQ-9): PHQ-2 SCORE: 0  , done 2/21/2024   Last [place] Suicide Screening on 2/28/2024 was No Risk.          Advanced Directives:   He does have a Living Will but we do NOT have it on file in Epic.    He has a Power of  for Health Care on file in [facility].        Patient Active Problem List   Diagnosis    Essential hypertension    Hypercholesterolemia    CAD (coronary artery disease)    Type 2 diabetes mellitus without complication, with long-term current use of insulin (HCC)    Class 1 obesity due to excess calories without serious comorbidity with body mass index (BMI) of 33.0 to 33.9 in adult    Pulmonary HTN (HCC)    Thrombocytopenia (HCC)    Polyneuropathy due to type 2 diabetes mellitus (HCC)    Osteoarthritis of spine with radiculopathy, cervical region     Allergies:  He is allergic to bromocriptine, clindamycin, and other.    Current Medications:  Outpatient Medications Marked as Taking for the 2/28/24 encounter (Office Visit) with Ashley Anna DO   Medication Sig    insulin aspart (NOVOLOG FLEXPEN) 100 Units/mL Subcutaneous Solution Pen-injector Take 16U with  meals and ISF 1:20 >120    furosemide 20 MG Oral Tab Take 1 tablet (20 mg total) by mouth daily.    Niacin ER, Antihyperlipidemic, 500 MG Oral Tab CR Take 1 tablet (500 mg total) by mouth nightly.    Glucose Blood (ACCU-CHEK GUIDE) In Vitro Strip Test glucose two times a day    Blood Glucose Monitoring Suppl (ACCU-CHEK GUIDE) w/Device Does not apply Kit 1 Device  in the morning and 1 Device before bedtime.    tamsulosin 0.4 MG Oral Cap Take 2 capsules (0.8 mg total) by mouth daily.    Glucose Blood (ACCU-CHEK GUIDE) In Vitro Strip Test 4-5 times per day.  Diagnoses: E11.649 and Z79.4    atorvastatin 40 MG Oral Tab Take 1 tablet (40 mg total) by mouth nightly. (Patient taking differently: Take 0.5 tablets (20 mg total) by mouth nightly.)    pregabalin 75 MG Oral Cap Take 1 capsule (75 mg total) by mouth daily.    Irbesartan 150 MG Oral Tab Take 1 tablet (150 mg total) by mouth daily.    Accu-Chek FastClix Lancets Does not apply Misc 1 Lancet  in the morning and 1 Lancet before bedtime. Test glucose twice daily.    Empagliflozin (JARDIANCE) 25 MG Oral Tab Take 1 tablet by mouth daily.    atenolol 25 MG Oral Tab Take 1 tablet (25 mg total) by mouth daily.    aspirin 81 MG Oral Tab EC Take 1 tablet (81 mg total) by mouth daily.    Blood Glucose Monitoring Suppl (ACCU-CHEK GUIDE) w/Device Does not apply Kit 1 kit by Does not apply route.    Insulin Pen Needle 31G X 8 MM Does not apply Misc 4/day    Lancets Misc. (ACCU-CHEK FASTCLIX LANCET) Does not apply Kit     niacin 500 MG Oral Tab Take 1 tablet (500 mg total) by mouth daily.    Potassium Chloride ER 10 MEQ Oral Tab CR Take 1 tablet (10 mEq total) by mouth 2 (two) times daily.    Levothyroxine Sodium 75 MCG Oral Tab Take 1 tablet (75 mcg total) by mouth before breakfast.    Ferrous Sulfate 325 (65 FE) MG Oral Tab Take 1 tablet (325 mg total) by mouth daily with breakfast.    LANTUS SOLOSTAR 100 UNIT/ML Subcutaneous Solution Pen-injector 45 Units. 45 units each morning     APIDRA SOLOSTAR 100 UNIT/ML Subcutaneous Solution Pen-injector 15 Units in the morning, at noon, and at bedtime. 15 units each meal    Isosorbide Mononitrate ER (IMDUR) 30 MG Oral Tablet 24 Hr Take 1 tablet (30 mg total) by mouth daily.    MetFORMIN HCl (GLUCOPHAGE) 500 MG Oral Tab 1 tablet (500 mg total) 2 (two) times daily with meals.       Medical History:  He  has a past medical history of Atherosclerosis of coronary artery (1994), Heart disease, Hyperthyroidism, Other and unspecified hyperlipidemia, Type II or unspecified type diabetes mellitus without mention of complication, not stated as uncontrolled, Unspecified essential hypertension, and Vertigo (10/04/2023).  Surgical History:  He  has a past surgical history that includes sinus surgery  ; cabg (1996); cataract (Ten yrs approx); colonoscopy (Not sure); and hemorrhoidectomy (Not sure).   Family History:  His family history includes Diabetes in his father and mother; Hypertension in his paternal grandmother.  Social History:  He  reports that he has never smoked. He has never used smokeless tobacco. He reports that he does not drink alcohol and does not use drugs.    Tobacco:  He has never smoked tobacco.    CAGE Alcohol Screen:   CAGE screening score of 0 on 2/21/2024, showing low risk of alcohol abuse.      Patient Care Team:  José Briggs MD as PCP - General (Family Medicine)  Laila Doyle MD as Consulting Physician (NEUROLOGY)    Review of Systems  GENERAL: feels well otherwise  SKIN: denies any unusual skin lesions  EYES: denies blurred vision or double vision  HEENT: denies nasal congestion, sinus pain or ST  LUNGS: denies shortness of breath with exertion  CARDIOVASCULAR: denies chest pain on exertion  GI: denies abdominal pain, denies heartburn  : 3 per night nocturia, no complaint of urinary incontinence      Objective:   Physical Exam  General Appearance:  Alert, cooperative, no distress, appears stated age   Head:  Normocephalic,  without obvious abnormality, atraumatic   Eyes:  PERRL, both eyes   Ears:  Normal TM's and external ear canals, both ears   Nose: Nares normal, septum midline   Throat: Lips, mucosa, and tongue normal   Neck: Supple   Back:   Symmetric   Lungs:   Clear to auscultation bilaterally, respirations unlabored       Heart:  Regularly irregular rhythm, S1, S2 normal, no murmur, rub or gallop   Abdomen:   Soft, non-tender, bowel sounds active all four quadrants,  no masses, no organomegaly   Extremities: Bilateral LE edema- chronic, up to midshin.    Skin: Skin color   Neurologic: Normal     /68   Pulse 74   Temp 98 °F (36.7 °C) (Temporal)   Resp 16   Ht 5' 5\" (1.651 m)   Wt 200 lb (90.7 kg)   SpO2 99%   BMI 33.28 kg/m²  Estimated body mass index is 33.28 kg/m² as calculated from the following:    Height as of this encounter: 5' 5\" (1.651 m).    Weight as of this encounter: 200 lb (90.7 kg).    Medicare Hearing Assessment:   Hearing Screening    Time taken: 2/28/2024  1:05 PM  Screening Method: Whisper Test  Whisper Test Result: Pass               Visual Acuity:   Right Eye Visual Acuity: Corrected Right Eye Chart Acuity: 20/20   Left Eye Visual Acuity: Corrected Left Eye Chart Acuity: 20/20   Both Eyes Visual Acuity: Corrected Both Eyes Chart Acuity: 20/20   Able To Tolerate Visual Acuity: Yes        Assessment & Plan:   Ida Burgess is a 78 year old male who presents for a Medicare Assessment.     Patient Active Problem List   Diagnosis    Essential hypertension  -Blood pressure is well-controlled currently taking furosemide 20 mg daily, irbesartan 150 mg daily, and atenolol 25 mg daily.     Hypercholesterolemia-well-controlled on atorvastatin 40 mg daily.    CAD (coronary artery disease)-history of CABG in 1996.  He is on beta-blocker, statin, irbesartan, Lasix.  He is under the care of cardiology.  Denies any chest pain or shortness of breath.    Type 2 diabetes mellitus without complication, with long-term  current use of insulin ([facility])-  Well-controlled.  Recent A1c was 7.6.  His Lantus is now increased to 45 units nightly.  He is also taking Jardiance, metformin.  Microalbumin stable.  Normal kidney function.    Class 1 obesity due to excess calories without serious comorbidity with body mass index (BMI) of 33.0 to 33.9 in adult    Pulmonary HTN (HCC)-patient on tamsulosin    Thrombocytopenia (HCC)-stable    Polyneuropathy due to type 2 diabetes mellitus (HCC)-stable    Osteoarthritis of spine with radiculopathy, cervical region-MRI revealed degenerative changes in the cervical spine and there was possible edema or myelomalacia noted.  He does have symptoms of motor disturbance, gait abnormality, weakness.  Denies any trouble with breathing.  Ambulating with a cane.  Patient has not seen neurosurgery for this.     Atherosclerosis of coronary artery of native heart without angina pectoris, unspecified vessel or lesion type  - Lipid Panel; Future      Encounter for annual health examination    Encounter for Medicare annual wellness exam     Osteoarthritis of spine with radiculopathy, cervical region   Polyneuropathy due to type 2 diabetes mellitus (HCC)    Myelomalacia (HCC)  Referred to spine center  - [facility] Referral for Acute Symptoms (Spine Center)        Reinforced healthy diet, lifestyle, and exercise.      No follow-ups on file.     Ashley Anna DO, 2/28/2024     Supplementary Documentation:   General Health:  In the past six months, have you lost more than 10 pounds without trying?: 2 - No  Has your appetite been poor?: No  Type of Diet: Balanced  How does the patient maintain a good energy level?: Daily Walks;Stretching  How would you describe your daily physical activity?: Light  How would you describe your current health state?: Fair  How do you maintain positive mental well-being?: Visiting Friends;Visiting Family  On a scale of 0 to 10, with 0 being no pain and 10 being severe  pain, what is your pain level?: 0 - (None)  In the past six months, have you experienced urine leakage?: 1-Yes  At any time do you feel concerned for the safety/well-being of yourself and/or your children, in your home or elsewhere?: No  Have you had any immunizations at another office such as Influenza, Hepatitis B, Tetanus, or Pneumococcal?: Yes          Ida Burgess's SCREENING SCHEDULE   Tests on this list are recommended by your physician but may not be covered, or covered at this frequency, by your insurer.   Please check with your insurance carrier before scheduling to verify coverage.   PREVENTATIVE SERVICES FREQUENCY &  COVERAGE DETAILS LAST COMPLETION DATE   Diabetes Screening    Fasting Blood Sugar / Glucose    One screening every 12 months if never tested or if previously tested but not diagnosed with pre-diabetes   One screening every 6 months if diagnosed with pre-diabetes Lab Results   Component Value Date     (H) 10/04/2023        Cardiovascular Disease Screening    Lipid Panel  Cholesterol  Lipoprotein (HDL)  Triglycerides Covered every 5 years for all Medicare beneficiaries without apparent signs or symptoms of cardiovascular disease Lab Results   Component Value Date    CHOLEST 147 08/08/2023    HDL 44 08/08/2023    LDL 78 08/08/2023    TRIG 146 08/08/2023         Electrocardiogram (EKG)   Covered if needed at Welcome to Medicare, and non-screening if indicated for medical reasons 10/04/2023      Ultrasound Screening for Abdominal Aortic Aneurysm (AAA) Covered once in a lifetime for one of the following risk factors    Men who are 65-75 years old and have ever smoked    Anyone with a family history -     Colorectal Cancer Screening  Covered for ages 50-85; only need ONE of the following:    Colonoscopy   Covered every 10 years    Covered every 2 years if patient is at high risk or previous colonoscopy was abnormal -    No recommendations at this time    Flexible Sigmoidoscopy   Covered  every 4 years -    Fecal Occult Blood Test Covered annually -   Prostate Cancer Screening    Prostate-Specific Antigen (PSA) Annually No results found for: \"PSA\"  There are no preventive care reminders to display for this patient.   Immunizations    Influenza Covered once per flu season  Please get every year 09/12/2023  No recommendations at this time    Pneumococcal Each vaccine ([id number] & [id number]) covered once after 65 Prevnar 13: 07/22/2019    Styovnabq53: 10/25/2021     No recommendations at this time    Hepatitis B One screening covered for patients with certain risk factors   -  No recommendations at this time    Tetanus Toxoid Not covered by Medicare Part B unless medically necessary (cut with metal); may be covered with your pharmacy prescription benefits -    Tetanus, Diptheria and Pertusis TD and TDaP Not covered by Medicare Part B -  No recommendations at this time    Zoster Not covered by Medicare Part B; may be covered with your pharmacy  prescription benefits -  No recommendations at this time     Diabetes      Hemoglobin A1C Annually; if last result is elevated, may be asked to retest more frequently.    Medicare covers every 3 months Lab Results   Component Value Date    A1C 7.6 (A) 01/02/2024       No recommendations at this time    Creat/alb ratio Annually Lab Results   Component Value Date    MICROALBCREA 121.1 (H) 10/24/2022       LDL Annually Lab Results   Component Value Date    LDL 78 08/08/2023       Dilated Eye Exam Annually Last Diabetic Eye Exam:  Last Dilated Eye Exam: 02/07/24  No data recorded       Annual Monitoring of Persistent Medications (ACE/ARB, digoxin diuretics, anticonvulsants)    Potassium Annually Lab Results   Component Value Date    K 4.2 10/04/2023         Creatinine   Annually Lab Results   Component Value Date    CREATSERUM 0.92 10/04/2023         BUN Annually Lab Results   Component Value Date    BUN 19 (H) 10/04/2023       Drug Serum Conc Annually No results  found for: \"DIGOXIN\", \"DIG\", \"VALP\"                  Ashley Anna, DO

## 2024-02-28 NOTE — PATIENT INSTRUCTIONS
Ida Burgess's SCREENING SCHEDULE   Tests on this list are recommended by your physician but may not be covered, or covered at this frequency, by your insurer.   Please check with your insurance carrier before scheduling to verify coverage.   PREVENTATIVE SERVICES FREQUENCY &  COVERAGE DETAILS LAST COMPLETION DATE   Diabetes Screening    Fasting Blood Sugar / Glucose    One screening every 12 months if never tested or if previously tested but not diagnosed with pre-diabetes   One screening every 6 months if diagnosed with pre-diabetes Lab Results   Component Value Date     (H) 10/04/2023        Cardiovascular Disease Screening    Lipid Panel  Cholesterol  Lipoprotein (HDL)  Triglycerides Covered every 5 years for all Medicare beneficiaries without apparent signs or symptoms of cardiovascular disease Lab Results   Component Value Date    CHOLEST 147 08/08/2023    HDL 44 08/08/2023    LDL 78 08/08/2023    TRIG 146 08/08/2023         Electrocardiogram (EKG)   Covered if needed at Welcome to Medicare, and non-screening if indicated for medical reasons 10/04/2023      Ultrasound Screening for Abdominal Aortic Aneurysm (AAA) Covered once in a lifetime for one of the following risk factors   • Men who are 65-75 years old and have ever smoked   • Anyone with a family history -     Colorectal Cancer Screening  Covered for ages 50-85; only need ONE of the following:    Colonoscopy   Covered every 10 years    Covered every 2 years if patient is at high risk or previous colonoscopy was abnormal -    No recommendations at this time    Flexible Sigmoidoscopy   Covered every 4 years -    Fecal Occult Blood Test Covered annually -   Prostate Cancer Screening    Prostate-Specific Antigen (PSA) Annually No results found for: \"PSA\"  There are no preventive care reminders to display for this patient.   Immunizations    Influenza Covered once per flu season  Please get every year -  No recommendations at this time    Pneumococcal  Each vaccine ([id number] & [id number]) covered once after 65 Prevnar 13: 07/22/2019    Moufpgiyq46: 10/25/2021     No recommendations at this time    Hepatitis B One screening covered for patients with certain risk factors   -  No recommendations at this time    Tetanus Toxoid Not covered by Medicare Part B unless medically necessary (cut with metal); may be covered with your pharmacy prescription benefits -    Tetanus, Diptheria and Pertusis TD and TDaP Not covered by Medicare Part B -  No recommendations at this time    Zoster Not covered by Medicare Part B; may be covered with your pharmacy  prescription benefits -  No recommendations at this time     Diabetes      Hemoglobin A1C Annually; if last result is elevated, may be asked to retest more frequently.    Medicare covers every 3 months Lab Results   Component Value Date    A1C 7.6 (A) 01/02/2024       No recommendations at this time    Creat/alb ratio Annually Lab Results   Component Value Date    MICROALBCREA 121.1 (H) 10/24/2022       LDL Annually Lab Results   Component Value Date    LDL 78 08/08/2023       Dilated Eye Exam Annually [unfilled]     Annual Monitoring of Persistent Medications (ACE/ARB, digoxin diuretics, anticonvulsants)    Potassium Annually Lab Results   Component Value Date    K 4.2 10/04/2023         Creatinine   Annually Lab Results   Component Value Date    CREATSERUM 0.92 10/04/2023         BUN Annually Lab Results   Component Value Date    BUN 19 (H) 10/04/2023       Drug Serum Conc Annually No results found for: \"DIGOXIN\", \"DIG\", \"VALP\"

## 2024-03-01 ENCOUNTER — SPINE CENTER NAVIGATION (OUTPATIENT)
Dept: SURGERY | Facility: CLINIC | Age: 79
End: 2024-03-01

## 2024-03-01 NOTE — PROGRESS NOTES
Spine Center Referral Navigation Encounter Note    Referred by: DEJON Anna     HPI: Osteoarthritis of spine with radiculopathy, cervical region-MRI revealed degenerative changes in the cervical spine and there was possible edema or myelomalacia noted.  He does have symptoms of motor disturbance, gait abnormality, weakness.  Denies any trouble with breathing.  Ambulating with a cane.  Patient has not seen neurosurgery for this.     Physical Therapy: for vertigo    Medications for Spine Symptoms: NA    Imaging: MRI    Previous Spine Injections: None    Previous Spine Surgery: None    Previously Seen Spine Care Providers: None    Information above is from chart review and patient reported.     Referred to: NS- previous referral to [facility], had not yet booked an appointment

## 2024-03-05 ENCOUNTER — OFFICE VISIT (OUTPATIENT)
Dept: SURGERY | Facility: CLINIC | Age: 79
End: 2024-03-05
Payer: MEDICARE

## 2024-03-05 VITALS
SYSTOLIC BLOOD PRESSURE: 130 MMHG | DIASTOLIC BLOOD PRESSURE: 86 MMHG | HEIGHT: 65 IN | WEIGHT: 200 LBS | BODY MASS INDEX: 33.32 KG/M2 | HEART RATE: 73 BPM

## 2024-03-05 DIAGNOSIS — M62.838 TRAPEZIUS MUSCLE SPASM: Primary | ICD-10-CM

## 2024-03-05 DIAGNOSIS — R20.2 NUMBNESS AND TINGLING OF BOTH LEGS: ICD-10-CM

## 2024-03-05 DIAGNOSIS — R20.0 NUMBNESS AND TINGLING OF BOTH LEGS: ICD-10-CM

## 2024-03-05 PROCEDURE — 99203 OFFICE O/P NEW LOW 30 MIN: CPT | Performed by: NEUROLOGICAL SURGERY

## 2024-03-05 RX ORDER — CYCLOBENZAPRINE HCL 5 MG
5 TABLET ORAL 3 TIMES DAILY PRN
Qty: 60 TABLET | Refills: 1 | Status: SHIPPED | OUTPATIENT
Start: 2024-03-05

## 2024-03-05 NOTE — H&P
Neurosurgery Clinic Visit  3/5/2024    Ida Burgess PCP:  José Briggs MD    1945 MRN [id number]       CHIEF COMPLAINT:  Chief Complaint   Patient presents with    New Patient       HISTORY OF PRESENT ILLNESS:  Ida Burgess is a(n) 78 year old male is here for a tense neck.  Patient states back in October for if he started having vertigo.  He got worked up.  He did physical therapy is helping quite a bit.  [place] said [place] his [place]igo return.  Again physical therapy helped him quite a bit.  He notes over  He has a slight bit of vertigo and works way to the left.  His main complaint from a neurosurgical standpoint is back pain at the base of his neck.  His low back hurts a little bit as well he is having tingling down his arms to his fifth fingers with some numbness.  Sometimes he has some tingling is in her thighs.  His hands are working okay.  He is not dropping any things his dexterity has been okay.  He is done some physical therapy in the past on his neck.  They did stim massage was not much help.  Has never had any muscle relaxers.  He is on pregabalin for other issues.  He has not had any injections.  He is here for evaluation.    REVIEW OF SYSTEMS:  The 12 point checklist was reviewed and was negative except: See above    ALLERGIES:  Allergies   Allergen Reactions    Bromocriptine ANAPHYLAXIS and OTHER (SEE COMMENTS)    Clindamycin HIVES    Other OTHER (SEE COMMENTS)     Cycloset       MEDICATIONS:  Current Outpatient Medications   Medication Sig Dispense Refill    cyclobenzaprine 5 MG Oral Tab Take 1 tablet (5 mg total) by mouth 3 (three) times daily as needed for Muscle spasms. 60 tablet 1    insulin aspart (NOVOLOG FLEXPEN) 100 Units/mL Subcutaneous Solution Pen-injector Take 16U with meals and ISF 1:20 >120 15 mL 2    furosemide 20 MG Oral Tab Take 1 tablet (20 mg total) by mouth daily. 90 tablet 1    Niacin ER, Antihyperlipidemic, 500 MG Oral Tab CR Take 1 tablet  (500 mg total) by mouth nightly. 90 tablet 1    Glucose Blood (ACCU-CHEK GUIDE) In Vitro Strip Test glucose two times a day 100 strip 3    Blood Glucose Monitoring Suppl (ACCU-CHEK GUIDE) w/Device Does not apply Kit 1 Device  in the morning and 1 Device before bedtime. 1 kit 0    tamsulosin 0.4 MG Oral Cap Take 2 capsules (0.8 mg total) by mouth daily. 180 capsule 0    Glucose Blood (ACCU-CHEK GUIDE) In Vitro Strip Test 4-5 times per day.  Diagnoses: E11.649 and Z79.4 400 strip 0    atorvastatin 40 MG Oral Tab Take 1 tablet (40 mg total) by mouth nightly. (Patient taking differently: Take 0.5 tablets (20 mg total) by mouth nightly.) 60 tablet 2    pregabalin 75 MG Oral Cap Take 1 capsule (75 mg total) by mouth daily. 90 capsule 1    Irbesartan 150 MG Oral Tab Take 1 tablet (150 mg total) by mouth daily. 90 tablet 3    Accu-Chek FastClix Lancets Does not apply Misc 1 Lancet  in the morning and 1 Lancet before bedtime. Test glucose twice daily. 100 each 5    Empagliflozin (JARDIANCE) 25 MG Oral Tab Take 1 tablet by mouth daily. 90 tablet 0    atenolol 25 MG Oral Tab Take 1 tablet (25 mg total) by mouth daily. 90 tablet 0    aspirin 81 MG Oral Tab EC Take 1 tablet (81 mg total) by mouth daily.      Blood Glucose Monitoring Suppl (ACCU-CHEK GUIDE) w/Device Does not apply Kit 1 kit by Does not apply route.      Insulin Pen Needle 31G X 8 MM Does not apply Misc 4/day      Lancets Misc. (ACCU-CHEK FASTCLIX LANCET) Does not apply Kit       niacin 500 MG Oral Tab Take 1 tablet (500 mg total) by mouth daily.      Potassium Chloride ER 10 MEQ Oral Tab CR Take 1 tablet (10 mEq total) by mouth 2 (two) times daily. 180 tablet 0    Levothyroxine Sodium 75 MCG Oral Tab Take 1 tablet (75 mcg total) by mouth before breakfast.      Ferrous Sulfate 325 (65 FE) MG Oral Tab Take 1 tablet (325 mg total) by mouth daily with breakfast.      LANTUS SOLOSTAR 100 UNIT/ML Subcutaneous Solution Pen-injector 45 Units. 45 units each morning       Isosorbide Mononitrate ER (IMDUR) 30 MG Oral Tablet 24 Hr Take 1 tablet (30 mg total) by mouth daily.      MetFORMIN HCl (GLUCOPHAGE) 500 MG Oral Tab 1 tablet (500 mg total) 2 (two) times daily with meals.      APIDRA SOLOSTAR 100 UNIT/ML Subcutaneous Solution Pen-injector 15 Units in the morning, at noon, and at bedtime. 15 units each meal (Patient not taking: Reported on 3/5/2024)         HISTORY:  Past Medical History:   Diagnosis Date    Atherosclerosis of coronary artery 1994    Heart disease     Hyperthyroidism     Other and unspecified hyperlipidemia     Type II or unspecified type diabetes mellitus without mention of complication, not stated as uncontrolled     Unspecified essential hypertension     Vertigo 10/04/2023     Past Surgical History:   Procedure Laterality Date    CABG  1996    4x bypass    CATARACT  Ten yrs approx    Bilateral    COLONOSCOPY  Not sure    HEMORRHOIDECTOMY  Not sure    SINUS SURGERY         Family History   Problem Relation Age of Onset    Diabetes Mother     Diabetes Father     Hypertension Paternal Grandmother         Sisters    Heart Disease Neg     High Blood Pressure Neg      Irfan  reports that he has never smoked. He has never used smokeless tobacco. He reports that he does not drink alcohol and does not use drugs.    PHYSICAL EXAMINATION:  Vital Signs:  /86   Pulse 73   Ht 65\"   Wt 200 lb (90.7 kg)   BMI 33.28 kg/m²   General: The patient is in no acute distress.  HEENT: The head is atraumatic and normocephalic.  The eyes, ears, nose and throat are clear. The pupils are equal, round, and reactive to light.  Hearing is intact and symmetric.  Muscular:  Exam reveals normal bulk and tone.  Neurologic Exam: The patient is oriented to time, person and place.  Memory, attention span, language findings, and knowledge and insight into the problem appear intact and appropriate.  Cranial nerve Exam:  Visual fields are full.  The pupils are equal, round, and reactive to  light.  The extraocular movements are intact.  Facial sensation in intact.  Facial symmetry and movement of the face is intact.  Hearing appears to be intact and symmetric.  Elevation of the palate appears symmetric as well.  Shoulder shrug is intact and symmetric.  The tongue is in the midline.    Strength:     Biceps Triceps Deltoids Wrist Extension Hand Intrinsics   Left 5/5 5/5 5/5 5/5 5/5   Right 5/5 5/5 5/5 5/5 5/5      Hip Flexion Hip Adduction Hip Abduction Knee Flexion Knee Extension Plantar- flexion Dorsi- flexion EHL   Left 5/5 5/5 5/5 5/5 5/5 5/5 5/5 5/5   Right 5/5 5/5 5/5 5/5 5/5 5/5 5/5 5/5     DTRs:   Biceps Triceps Brachio Patella Ankle   Left 2+ 2+ 2+ 2+ 2+   Right 2+ 2+ 2+ 2+ 2+     Clonus:  Absent.  Babinski: Deferred  Baker's:  Absent.  Romberg:  Negative.  Pronator drift: Absent.  Sensation:  Intact to light touch in all tested dermatomes in the upper and lower extremities.    Coordination:  Appears to be intact on finger-to-nose testing and tandem walking.  Slight tremor on finger-nose  Gait and station: Little slowed, he is able to get on his toes, unsteady on his heels, unsteady and tandem  Spine: Nontender palpation along the midline, left trap is in severe spasm right trap not as bad, there meets negative, Spurling's negative    REVIEW OF STUDIES:  MRI reviewed which shows disc bulge at central at C5-6 and to the left at C6-7      ASSESSMENT AND PLAN:  78-year-old gentleman here for neck pain  His neck is definitely in some spasm  Of recommended muscle relaxer  I wrote for Flexeril  I told to take around-the-clock a couple days when he did not have much to do to see how he felt and see if this bronchospasm  Also refer him for pain management for possible trigger point injections  Will see him back in a couple months  I ordered an MRI of his lumbar spine to look for any cause of his inner thigh tingling on base laterally  He may have a little bit of C8 radiculopathy but that level looks  good on his MRI as well as only some tingling down to his fifth fingers  All questions were answered  Films reviewed in detail  Patient appreciative        Time spent on counseling/coordination of care:  30 Minutes    Total time spent with patient:  30 Minutes      Elder Mcknight MD   [facility]  3/5/2024  10:37 AM  Dictated but not proofread

## 2024-03-05 NOTE — PATIENT INSTRUCTIONS
Refill policies:    Allow 2-3 business days for refills; controlled substances may take longer.  Contact your pharmacy at least 5 days prior to running out of medication and have them send an electronic request or submit request through the “request refill” option in your [employer] account.  Refills are not addressed on weekends; covering physicians do not authorize routine medications on weekends.  No narcotics or controlled substances are refilled after noon on Fridays or by on call physicians.  By law, narcotics must be electronically prescribed.  A 30 day supply with no refills is the maximum allowed.  If your prescription is due for a refill, you may be due for a follow up appointment.  To best provide you care, patients receiving routine medications need to be seen at least once a year.  Patients receiving narcotic/controlled substance medications need to be seen at least once every 3 months.  In the event that your preferred pharmacy does not have the requested medication in stock (e.g. Backordered), it is your responsibility to find another pharmacy that has the requested medication available.  We will gladly send a new prescription to that pharmacy at your request.    Scheduling Tests:    If your physician has ordered radiology tests such as MRI or CT scans, please contact Central Scheduling at [phone] right away to schedule the test.  Once scheduled, the [facility] Centralized Referral Team will work with your insurance carrier to obtain pre-certification or prior authorization.  Depending on your insurance carrier, approval may take 3-10 days.  It is highly recommended patients assure they have received an authorization before having a test performed.  If test is done without insurance authorization, patient may be responsible for the entire amount billed.      Precertification and Prior Authorizations:  If your physician has recommended that you have a procedure or additional testing performed the [facility]  Centralized Referral Team will contact your insurance carrier to obtain pre-certification or prior authorization.    You are strongly encouraged to contact your insurance carrier to verify that your procedure/test has been approved and is a COVERED benefit.  Although the [facility] Centralized Referral Team does its due diligence, the insurance carrier gives the disclaimer that \"Although the procedure is authorized, this does not guarantee payment.\"    Ultimately the patient is responsible for payment.   Thank you for your understanding in this matter.  Paperwork Completion:  If you require FMLA or disability paperwork for your recovery, please make sure to either drop it off or have it faxed to our office at [phone]. Be sure the form has your name and date of birth on it.  The form will be faxed to our Forms Department and they will complete it for you.  There is a 25$ fee for all forms that need to be filled out.  Please be aware there is a 10-14 day turnaround time.  You will need to sign a release of information (COY) form if your paperwork does not come with one.  You may call the Forms Department with any questions at [phone].  Their fax number is [phone].

## 2024-03-05 NOTE — PROGRESS NOTES
New patient:  Reason for visit:  Neck pain, numbness on arms, low back pain, weakness/balance issues     Estimated time of onset:  years, worsening within the last couple years    Numeric Rating Scale:        Pain at Present:  5/10                                                                                                                       Past Treatments for Current Pain Condition:     Physical Therapy    Prior diagnostic testing related to this condition:   MRI spine cervical DOS 01/05/24

## 2024-03-16 RX ORDER — TAMSULOSIN HYDROCHLORIDE 0.4 MG/1
0.8 CAPSULE ORAL DAILY
Qty: 180 CAPSULE | Refills: 1 | Status: SHIPPED | OUTPATIENT
Start: 2024-03-16

## 2024-03-16 NOTE — TELEPHONE ENCOUNTER
Fax received from [facility] requesting r/f on Tamsulosin 0.4 mg caps.    Future Appointments   Date Time Provider Department Center   3/28/2024  2:45 PM PF MRI 1 (1.5T) [facility] [place]   4/4/2024  2:45 PM Niurka Romano DO [id number] [facility] [place]   5/2/2024  3:00 PM Elder Mcknight MD [id number] [facility] [place]   5/28/2024  1:40 PM Laila Doyle MD [facility] [address] [facility] [address]     LOV: cpx: 2/28/24  Last r/f: 10/23/23 # 180 0 r/fs  Labs: 10/4/23     RTC: none    Please advise

## 2024-03-25 ENCOUNTER — TELEPHONE (OUTPATIENT)
Facility: CLINIC | Age: 79
End: 2024-03-25

## 2024-03-25 DIAGNOSIS — E11.649 TYPE 2 DIABETES MELLITUS WITH HYPOGLYCEMIA WITHOUT COMA, WITH LONG-TERM CURRENT USE OF INSULIN (HCC): ICD-10-CM

## 2024-03-25 DIAGNOSIS — Z79.4 TYPE 2 DIABETES MELLITUS WITHOUT COMPLICATION, WITH LONG-TERM CURRENT USE OF INSULIN (HCC): Primary | ICD-10-CM

## 2024-03-25 DIAGNOSIS — Z79.4 TYPE 2 DIABETES MELLITUS WITH HYPOGLYCEMIA WITHOUT COMA, WITH LONG-TERM CURRENT USE OF INSULIN (HCC): ICD-10-CM

## 2024-03-25 DIAGNOSIS — E11.9 TYPE 2 DIABETES MELLITUS WITHOUT COMPLICATION, WITH LONG-TERM CURRENT USE OF INSULIN (HCC): Primary | ICD-10-CM

## 2024-03-25 NOTE — TELEPHONE ENCOUNTER
Patient phoning RN stating he'd like his lab orders to be changed from [facility] to [facility].     Labs currently active in Epic and ordered per Dr. Romano (ordered on 10/4/23)  -Microalb/creat ratio, urine  -TSH and Free T4    Per Dr. Romano's o.v. note dated- 1/2/24  \"Plan on repeat TFTs prior to follow-up appointment\"     RTC-4/4/24    RN will pend and route labs to Dr. Romano for approval.

## 2024-03-26 ENCOUNTER — LAB ENCOUNTER (OUTPATIENT)
Dept: LAB | Age: 79
End: 2024-03-26
Attending: FAMILY MEDICINE
Payer: MEDICARE

## 2024-03-26 DIAGNOSIS — E11.649 TYPE 2 DIABETES MELLITUS WITH HYPOGLYCEMIA WITHOUT COMA, WITH LONG-TERM CURRENT USE OF INSULIN (HCC): ICD-10-CM

## 2024-03-26 DIAGNOSIS — Z79.4 TYPE 2 DIABETES MELLITUS WITH HYPOGLYCEMIA WITHOUT COMA, WITH LONG-TERM CURRENT USE OF INSULIN (HCC): ICD-10-CM

## 2024-03-26 DIAGNOSIS — Z79.4 TYPE 2 DIABETES MELLITUS WITHOUT COMPLICATION, WITH LONG-TERM CURRENT USE OF INSULIN (HCC): ICD-10-CM

## 2024-03-26 DIAGNOSIS — E11.9 TYPE 2 DIABETES MELLITUS WITHOUT COMPLICATION, WITH LONG-TERM CURRENT USE OF INSULIN (HCC): ICD-10-CM

## 2024-03-26 DIAGNOSIS — I25.10 ATHEROSCLEROSIS OF CORONARY ARTERY OF NATIVE HEART WITHOUT ANGINA PECTORIS, UNSPECIFIED VESSEL OR LESION TYPE: ICD-10-CM

## 2024-03-26 LAB
CHOLEST SERPL-MCNC: 113 MG/DL (ref ?–200)
CREAT UR-SCNC: 61.7 MG/DL
FASTING PATIENT LIPID ANSWER: YES
HDLC SERPL-MCNC: 47 MG/DL (ref 40–59)
LDLC SERPL CALC-MCNC: 47 MG/DL (ref ?–100)
MICROALBUMIN UR-MCNC: 9.08 MG/DL
MICROALBUMIN/CREAT 24H UR-RTO: 147.2 UG/MG (ref ?–30)
NONHDLC SERPL-MCNC: 66 MG/DL (ref ?–130)
T4 FREE SERPL-MCNC: 1.3 NG/DL (ref 0.8–1.7)
TRIGL SERPL-MCNC: 98 MG/DL (ref 30–149)
TSI SER-ACNC: 3.02 MIU/ML (ref 0.36–3.74)
VLDLC SERPL CALC-MCNC: 14 MG/DL (ref 0–30)

## 2024-03-26 PROCEDURE — 82570 ASSAY OF URINE CREATININE: CPT | Performed by: STUDENT IN AN ORGANIZED HEALTH CARE EDUCATION/TRAINING PROGRAM

## 2024-03-26 PROCEDURE — 84439 ASSAY OF FREE THYROXINE: CPT | Performed by: STUDENT IN AN ORGANIZED HEALTH CARE EDUCATION/TRAINING PROGRAM

## 2024-03-26 PROCEDURE — 82043 UR ALBUMIN QUANTITATIVE: CPT | Performed by: STUDENT IN AN ORGANIZED HEALTH CARE EDUCATION/TRAINING PROGRAM

## 2024-03-26 PROCEDURE — 36415 COLL VENOUS BLD VENIPUNCTURE: CPT | Performed by: STUDENT IN AN ORGANIZED HEALTH CARE EDUCATION/TRAINING PROGRAM

## 2024-03-26 PROCEDURE — 80061 LIPID PANEL: CPT

## 2024-03-26 PROCEDURE — 84443 ASSAY THYROID STIM HORMONE: CPT | Performed by: STUDENT IN AN ORGANIZED HEALTH CARE EDUCATION/TRAINING PROGRAM

## 2024-03-28 ENCOUNTER — HOSPITAL ENCOUNTER (OUTPATIENT)
Dept: MRI IMAGING | Age: 79
Discharge: HOME | End: 2024-03-28
Attending: NEUROLOGICAL SURGERY
Payer: MEDICARE

## 2024-03-28 DIAGNOSIS — R20.0 NUMBNESS AND TINGLING OF BOTH LEGS: ICD-10-CM

## 2024-03-28 DIAGNOSIS — R20.2 NUMBNESS AND TINGLING OF BOTH LEGS: ICD-10-CM

## 2024-03-28 PROCEDURE — 72148 MRI LUMBAR SPINE W/O DYE: CPT | Performed by: NEUROLOGICAL SURGERY

## 2024-04-04 ENCOUNTER — OFFICE VISIT (OUTPATIENT)
Facility: CLINIC | Age: 79
End: 2024-04-04
Payer: MEDICARE

## 2024-04-04 VITALS — DIASTOLIC BLOOD PRESSURE: 78 MMHG | HEART RATE: 76 BPM | OXYGEN SATURATION: 99 % | SYSTOLIC BLOOD PRESSURE: 124 MMHG

## 2024-04-04 DIAGNOSIS — E11.9 TYPE 2 DIABETES MELLITUS WITHOUT COMPLICATION, WITH LONG-TERM CURRENT USE OF INSULIN (HCC): Primary | ICD-10-CM

## 2024-04-04 DIAGNOSIS — Z79.4 TYPE 2 DIABETES MELLITUS WITHOUT COMPLICATION, WITH LONG-TERM CURRENT USE OF INSULIN (HCC): Primary | ICD-10-CM

## 2024-04-04 DIAGNOSIS — E78.2 MIXED HYPERLIPIDEMIA: ICD-10-CM

## 2024-04-04 LAB — HEMOGLOBIN A1C: 7.5 % (ref 4.3–5.6)

## 2024-04-04 PROCEDURE — 99214 OFFICE O/P EST MOD 30 MIN: CPT | Performed by: STUDENT IN AN ORGANIZED HEALTH CARE EDUCATION/TRAINING PROGRAM

## 2024-04-04 PROCEDURE — 83036 HEMOGLOBIN GLYCOSYLATED A1C: CPT | Performed by: STUDENT IN AN ORGANIZED HEALTH CARE EDUCATION/TRAINING PROGRAM

## 2024-04-04 RX ORDER — INSULIN GLARGINE 100 [IU]/ML
65 INJECTION, SOLUTION SUBCUTANEOUS NIGHTLY
Qty: 60 ML | Refills: 1 | Status: SHIPPED | OUTPATIENT
Start: 2024-04-04 | End: 2024-10-01

## 2024-04-04 NOTE — PROGRESS NOTES
Last A1C: Last A1c value was 7.5% done 4/4/24.          4/4/2024     2:48 PM   DIABETIC FLOWSHEET   BP (enc vitals) 124/78        Last urine microalbumin and LDL:   Lab Results   Component Value Date    LDL 47 03/26/2024    MICROALBCREA 147.2 (H) 03/26/2024

## 2024-04-04 NOTE — PATIENT INSTRUCTIONS
Return Visit   [ x ] Physician in 3 months   [  ] In person or video visit  [  ] In person only    [ x ] After visit summary   [  ] Placed labs/imaging. Labs are to be drawn at 8A and fasting.    [  ] Give blood sugar log     It was great seeing you today!    Today we discussed your diabetes:  Continue: Jardiance 25mg, Metformin 1000mg BID   Decrease: Lantus to 64 units daily  Increase:  Apidra to 20u with each meal and take it prior to meal . Continue: 1:20 above 120 correction with a reverse scale if premeal glucose below    We reviewed your labs including your lipid panel and urine protein test  We will plan on following up in 3 months  Please keep me updated if your glucose is consistely over 250 or less than 70    Take care!  -Dr. Romano

## 2024-04-04 NOTE — PROGRESS NOTES
ENDOCRINOLOGY, DIABETES & METABOLISM    Name: Ida Burgess  MR: [id number]  Initial Date: 10/3/2023  Today's Date: 1/2/2024  Referring Physician: No ref. provider found    Subjective    HISTORY OF PRESENT ILLNESS:  Ida Burgess is a 78 year oldM  seen in consultation for his Type 2 Diabetes.    Initial HPI 10/2023  Diabetes was diagnosed 1986. 1/2023 A1c 7.8% >1/2023 7%  Polyuria/polydipsia:  No on lasix; gets up overnight around 2x  Blurred vision:  No  Initially started on oral medications and then eventually added on insulin  Potential CI to medications:  GERD: No  UTI/urinary frequency:Yes: as above  H/o Pancreatitis: No  FHx of Thyroid cancer: No  CAD/CHF: Yes     Diabetes Treatment history   Duration of therapy with insulin: For many years.    Interval Hx 4/2024  Saw Hendrickson 2/2024  Recommended medication changes/Plan:  - Increase Lantus to 48 u daily 2/2024 > 52 units but takes 68-70 units    - Check your blood sugar 2 hours after dinner on Tuesday and Thursday   - Check your blood sugar 2 hours after lunch opn Monday, Wednesday and Friday     Current Regimen for diabetes:   Oral/Injectable medications: Jardiance 25mg, Metformin 1000mg BID   Basal:  Lantus 68-70 units qAM . Injects in abdomen  Prandial:  Apidra 18u with each meal and takes it prior to meal . Also on a 1:20 above 120 correction with a reverse scale if premeal glucose below 100. Injects in abdomen  Missed doses: very rare   Treatment history:   He stopped ozempic, bydureon and rybelsus due to GI upset  He stopped actos due to concern about bladder cancer  He stopped cycloset due to hypotension    Complications  Neuropathy: Yes: could be multifactorial with back pain .  Endorses numbness and feet Follows with neurology: No    Nephropathy: Yes: positive malb Last MCR: 151 12/2023 Follows with nephrology: No   Retinopathy: No Last Ophthalmology Visit: 2/2023  Follows with Dr. Friedman at [facility]; due for f/u 2/2024     CAD/ HLD: Yes: CAD and  HLD  Status post stent placement On Statin: Yes: Atorvastatin 20mg LDL 78 8/2023   Hypertension: Yes:    On ACEi: Yes:   Irbesartan, Jardiance    Stroke/TIA: No On Aspirin: Yes: asa 81    Skin Ulcer/Infection: No     Follows with Podiatry: Yes: follows every 3 months. Last saw in December Last foot exam: Foot/Ankle Musculoskeletal Exam  Done with podiatry with good sensation 3/2024 - will confirm Denies history of peripheral vascular disease     TSH 3.63 8/2023, Cr 0.81 8/2023, Ca 8.8 8/2023, ALP 76 8/2023         Hpothyroidisim  Endorses compliance with his LT4 75g   States that he has been on this medication for very long time and it was started by his endocrinologist been on this for a long time    ALLERGIES, PAST MEDICAL HISTORY, SOCIAL HISTORY, FAMILY HISTORY reviewed and updated in Epic as appropriate     CURRENT MEDICATIONS:    tamsulosin 0.4 MG Oral Cap Take 2 capsules (0.8 mg total) by mouth daily. 180 capsule 1    cyclobenzaprine 5 MG Oral Tab Take 1 tablet (5 mg total) by mouth 3 (three) times daily as needed for Muscle spasms. 60 tablet 1    furosemide 20 MG Oral Tab Take 1 tablet (20 mg total) by mouth daily. 90 tablet 1    Niacin ER, Antihyperlipidemic, 500 MG Oral Tab CR Take 1 tablet (500 mg total) by mouth nightly. 90 tablet 1    Glucose Blood (ACCU-CHEK GUIDE) In Vitro Strip Test glucose two times a day 100 strip 3    Blood Glucose Monitoring Suppl (ACCU-CHEK GUIDE) w/Device Does not apply Kit 1 Device  in the morning and 1 Device before bedtime. 1 kit 0    Glucose Blood (ACCU-CHEK GUIDE) In Vitro Strip Test 4-5 times per day.  Diagnoses: E11.649 and Z79.4 400 strip 0    pregabalin 75 MG Oral Cap Take 1 capsule (75 mg total) by mouth daily. 90 capsule 1    Irbesartan 150 MG Oral Tab Take 1 tablet (150 mg total) by mouth daily. 90 tablet 3    Accu-Chek FastClix Lancets Does not apply Misc 1 Lancet  in the morning and 1 Lancet before bedtime. Test glucose twice daily. 100 each 5    Empagliflozin  (JARDIANCE) 25 MG Oral Tab Take 1 tablet by mouth daily. 90 tablet 0    atenolol 25 MG Oral Tab Take 1 tablet (25 mg total) by mouth daily. 90 tablet 0    aspirin 81 MG Oral Tab EC Take 1 tablet (81 mg total) by mouth daily.      Blood Glucose Monitoring Suppl (ACCU-CHEK GUIDE) w/Device Does not apply Kit 1 kit by Does not apply route.      Insulin Pen Needle 31G X 8 MM Does not apply Misc 4/day      Lancets Misc. (ACCU-CHEK FASTCLIX LANCET) Does not apply Kit       niacin 500 MG Oral Tab Take 1 tablet (500 mg total) by mouth daily.      Potassium Chloride ER 10 MEQ Oral Tab CR Take 1 tablet (10 mEq total) by mouth 2 (two) times daily. 180 tablet 0    Levothyroxine Sodium 75 MCG Oral Tab Take 1 tablet (75 mcg total) by mouth before breakfast.      Ferrous Sulfate 325 (65 FE) MG Oral Tab Take 1 tablet (325 mg total) by mouth daily with breakfast.      LANTUS SOLOSTAR 100 UNIT/ML Subcutaneous Solution Pen-injector 45 Units. 45 units each morning      APIDRA SOLOSTAR 100 UNIT/ML Subcutaneous Solution Pen-injector 15 Units in the morning, at noon, and at bedtime. 15 units each meal      Isosorbide Mononitrate ER (IMDUR) 30 MG Oral Tablet 24 Hr Take 1 tablet (30 mg total) by mouth daily.      MetFORMIN HCl (GLUCOPHAGE) 500 MG Oral Tab 1 tablet (500 mg total) 2 (two) times daily with meals.       REVIEW OF SYSTEMS: Pertinent positives documented above in HP  IObjective      PHYSICAL EXAMINATION  Wt Readings from Last 3 Encounters:   03/05/24 200 lb (90.7 kg)   02/28/24 200 lb (90.7 kg)   01/02/24 200 lb (90.7 kg)      BMI Readings from Last 3 Encounters:   03/05/24 33.28 kg/m²   02/28/24 33.28 kg/m²   01/02/24 33.28 kg/m²     Vital Signs:   Vitals:    04/04/24 1448   BP: 124/78   Pulse: 76   SpO2: 99%     There is no height or weight on file to calculate BMI.   General Appearance:  Alert, in no acute distress, well developed, increased kyphosis, ambulates with cane  Eyes:  normal conjunctivae,  sclera  Ears/Nose/Mouth/Throat/Neck:  normal hearing, normal speech and no palpable thyroid nodules  Respiratory:  breathing comfortably on room air, clear to auscultation bilaterally  Cardiovascular:  regular rate and rhythm, no murmurs, S3 or S4, mild bilateral peripheral edema  Psychiatric:  Oriented to person, place and time, appropriate mood & affect  Skin: Normal moisture and skin texture  Neuro: sensory grossly intact, motor grossly intact.  Ambulating with cane    Recent Labs: Independently reviewed labs on 4/4/2024 in [facility] under lab tab.  Interpretation: Goal A1c of 7 to 8%  Diabetic Labs:   A1c 7.5 4/4/2024  Last A1c value was 7.6% done 1/2/2024.  A1c of 7.8% for October 3, 2023       Positive microalbumin of 121 October 2022, 151 12/2023, 147 2/2024    Diabetic Health Maintenance:  Last saw eye clinic February 2023 without any retinopathy  Follows with podiatry and had foot exam this year of 2023    Glucose Log Review  Checks BG 4-5 times/day- reviewed    He previously tried a CGM and he did not like it   Denies recent hypoglycemic episodes. Previously had recurrent episodes when he was on a higher dose of latnus. Sinced the reduction of his insulin he notes significant improvement of his hypoglycemia    Radiology: No relevant imaging at this time        ASSESSMENT & PLAN  Ida Burgess is a 78 year old male seen in consultation for:    Controlled Type 2 Diabetes   Importance of better glucose control in preventing onset/progression of end-organ damage discussed, as well as, goals of therapy and clinical significance of A1c   We reviewed his blood glucose readings since last visit and with a stable A1c of 7.5%.     Goal A1c is 7 to 8% but ideally 7 to 7.5% with his history of cardiac disease and positive urine microalbumin  Our goal is to avoid hypoglycemia.  Patient denies any episodes of hypoglycemia over the last few months  Continue: Jardiance 25mg, Metformin 1000mg BID   Decrease: Lantus to 64 units  daily  Increase:  Apidra to 20u with each meal and take it prior to meal . Continue: 1:20 above 120 correction with a reverse scale if premeal glucose below    Surveillance for Complications & Risks  oHgA1c (goal <7%): Yes, A1c of 7.5%    oRenal: microalbumin: Positive, last screened 3/2024  oBlood Pressure (goal <130/80):  Yes  oOptho: Eye screening (yearly): Yes, seen in 2/2023.   oNeuro: Encouraged proper footwear and regular visits with podiatry. Foot exam (yearly): yes   oCV: hx of HLD -> LDL 47 (goal: LDL<100), TG 98 on 3/2024  Taking ASA(eg, age >40, cigarette smoking, HTN, obesity, albuminuria, HLD, or a FHx of CAD) : Yes    Hypertension  Blood pressure stable this visit  Continue irbesartan and Lasix and atenolol  We will defer to primary regarding blood pressure management    Hyperlipidemia  Patient states compliant with his atorvastatin 40 mg daily   Most recent LDL at goal 3/2024  With his history of cardiac disease and diabetes, discussed increasing his atorvastatin to 40 mg daily at 10/2023 visit  Discussed side effects     Repeat labs 3/2025    Hypothyroidism   Continue levothyroxine 75 mcg daily  Recent TFTs noted to be within range on 3/2024  Patient is clinically euthyroid so we will continue above dose  Plan on repeat TFTs 9/2024    Nutrition:   Vitamin B12, Vitamin D3 as needed  Recommend chewable multivitamin daily.   Advised whole food plant based diet, patient is primarily vegetarian       Interventions Discussed  Eat 3 meals per day Yes   Drink 64oz of water per day Yes  Utilize portion control strategies to reduce calorie intake Yes   Exercise goal: Yes  Goals: Promote healthy eating, routine exercise/activity.   1. Patient aware of the adverse effects of suboptimal glucose control and goals of therapy  2. Reinforced timing and compliance with medication, SMBG and routine follow up     The above assessment and plan was discussed with the patient. The patient noted understanding and agreement  with the plan listed above. The patient is aware to contact the office if further concerns arise.  I have reviewed prior external note(s) from each unique source (PCP and other specialists), reviewed results/ordered further testing.      Visit Duration : A total of 40 minutes was spent today on obtaining history, reviewing pertinent labs, evaluating patient, providing multiple treatment options, reinforcing diet/exercise and compliance, and completing documentation.     Note to patient: The 21 Century Cures Act makes medical notes like these available to patients in the interest of transparency. However, be advised this is a medical document. It is intended as peer to peer communication. It is written in medical language and may contain abbreviations or verbiage that are unfamiliar. It may appear blunt or direct. Medical documents are intended to carry relevant information, facts as evident, and the clinical opinion of the practitioner.    Return in about 3 months (around 7/4/2024).     Niurka Romano DO  Endocrinology, Diabetes & Metabolism   4/4/2024

## 2024-04-07 ENCOUNTER — PATIENT MESSAGE (OUTPATIENT)
Facility: CLINIC | Age: 79
End: 2024-04-07

## 2024-04-08 NOTE — TELEPHONE ENCOUNTER
Agree- could also decrease to 60 units of long acting. Patient was prescribed a different dose but was taking around 68 units of long acting at home. At recent visit, I decreased to 64 but based on current glucose readings, would recommend further decrease to 60 units.     Is he currently taking 20 units with meals? If so, he may benefit from 16 units with meals. I am unsure if he is comfortable with carb counting.

## 2024-04-08 NOTE — TELEPHONE ENCOUNTER
Patient Confirms following dosing as prescribed at LOV.  BG levels are lower after meals especially lunch / dinner    Recommend ICR of 1:18 with lunch/dinner, stay at 1:20 with breakfast.  May be able to lighten long acting insulin as well given blood sugars all between 80 -115 mg/dL    Routed to provider for review

## 2024-04-08 NOTE — TELEPHONE ENCOUNTER
From: Ida Burgess  To: Niurka Romano  Sent: 4/7/2024 8:36 PM CDT  Subject: Hypoglycemic episodes     Started your recommended doses . Here are some readings.   04/03 8.06 am 113. 9.05am 106.   04/05. 9.32 am 113. 3.56 pm. 90. 8.19 pm. 148  04/06. 7.22 am 93  04/07 am. 8.48 am 87. 1.26 pm 79. 1.40 pm 64  1.59 pm. 49 2.31 pm 62 2.51 pm 98.   Pls advise

## 2024-04-30 ENCOUNTER — TELEPHONE (OUTPATIENT)
Dept: FAMILY MEDICINE CLINIC | Facility: CLINIC | Age: 79
End: 2024-04-30

## 2024-04-30 DIAGNOSIS — E11.42 POLYNEUROPATHY DUE TO TYPE 2 DIABETES MELLITUS (HCC): Primary | ICD-10-CM

## 2024-04-30 RX ORDER — NIACIN 500 MG/1
500 TABLET, EXTENDED RELEASE ORAL NIGHTLY
Qty: 90 TABLET | Refills: 1 | Status: SHIPPED | OUTPATIENT
Start: 2024-04-30

## 2024-04-30 RX ORDER — PREGABALIN 75 MG/1
75 CAPSULE ORAL DAILY
Qty: 90 CAPSULE | Refills: 1 | Status: SHIPPED | OUTPATIENT
Start: 2024-04-30

## 2024-04-30 NOTE — TELEPHONE ENCOUNTER
Requesting Pregabalin 75mg  Last OV: 2/28/24 Physical  RTC: 1 year  Last Rx'd 8/21/23 #90 with 1 refill    Future Appointments   Date Time Provider Department Center   5/2/2024  3:00 PM Elder Mcknight MD [id number] [facility]   5/28/2024  1:40 PM Laila Doyle MD [facility] [address]   7/11/2024  3:00 PM Niurka Romano DO [id number] [facility]       Non-protocol med:  Rx pended and routed for approval/denial

## 2024-05-02 ENCOUNTER — OFFICE VISIT (OUTPATIENT)
Dept: SURGERY | Facility: CLINIC | Age: 79
End: 2024-05-02
Payer: MEDICARE

## 2024-05-02 VITALS — BODY MASS INDEX: 33.32 KG/M2 | HEIGHT: 65 IN | WEIGHT: 200 LBS

## 2024-05-02 DIAGNOSIS — M62.838 TRAPEZIUS MUSCLE SPASM: Primary | ICD-10-CM

## 2024-05-02 PROCEDURE — 99213 OFFICE O/P EST LOW 20 MIN: CPT | Performed by: NEUROLOGICAL SURGERY

## 2024-05-02 RX ORDER — ORPHENADRINE CITRATE 100 MG/1
100 TABLET, EXTENDED RELEASE ORAL 2 TIMES DAILY PRN
Qty: 60 TABLET | Refills: 2 | Status: SHIPPED | OUTPATIENT
Start: 2024-05-02

## 2024-05-02 NOTE — PATIENT INSTRUCTIONS
Refill policies:    Allow 2-3 business days for refills; controlled substances may take longer.  Contact your pharmacy at least 5 days prior to running out of medication and have them send an electronic request or submit request through the “request refill” option in your [employer] account.  Refills are not addressed on weekends; covering physicians do not authorize routine medications on weekends.  No narcotics or controlled substances are refilled after noon on Fridays or by on call physicians.  By law, narcotics must be electronically prescribed.  A 30 day supply with no refills is the maximum allowed.  If your prescription is due for a refill, you may be due for a follow up appointment.  To best provide you care, patients receiving routine medications need to be seen at least once a year.  Patients receiving narcotic/controlled substance medications need to be seen at least once every 3 months.  In the event that your preferred pharmacy does not have the requested medication in stock (e.g. Backordered), it is your responsibility to find another pharmacy that has the requested medication available.  We will gladly send a new prescription to that pharmacy at your request.    Scheduling Tests:    If your physician has ordered radiology tests such as MRI or CT scans, please contact Central Scheduling at [phone] right away to schedule the test.  Once scheduled, the [facility] Centralized Referral Team will work with your insurance carrier to obtain pre-certification or prior authorization.  Depending on your insurance carrier, approval may take 3-10 days.  It is highly recommended patients assure they have received an authorization before having a test performed.  If test is done without insurance authorization, patient may be responsible for the entire amount billed.      Precertification and Prior Authorizations:  If your physician has recommended that you have a procedure or additional testing performed the [facility]  Centralized Referral Team will contact your insurance carrier to obtain pre-certification or prior authorization.    You are strongly encouraged to contact your insurance carrier to verify that your procedure/test has been approved and is a COVERED benefit.  Although the [facility] Centralized Referral Team does its due diligence, the insurance carrier gives the disclaimer that \"Although the procedure is authorized, this does not guarantee payment.\"    Ultimately the patient is responsible for payment.   Thank you for your understanding in this matter.  Paperwork Completion:  If you require FMLA or disability paperwork for your recovery, please make sure to either drop it off or have it faxed to our office at [phone]. Be sure the form has your name and date of birth on it.  The form will be faxed to our Forms Department and they will complete it for you.  There is a 25$ fee for all forms that need to be filled out.  Please be aware there is a 10-14 day turnaround time.  You will need to sign a release of information (COY) form if your paperwork does not come with one.  You may call the Forms Department with any questions at [phone].  Their fax number is [phone].

## 2024-05-02 NOTE — PROGRESS NOTES
Neurosurgery Clinic Visit  2024    Ida Burgess PCP:  Ashley Anna DO    1945 MRN [id number]     HISTORY OF PRESENT ILLNESS:  Ida Burgess is a(n) 78 year old male here for follow-up  He got his lumbar MRI  His tingling in his legs get better  He still having little bit of stiff neck  Flexeril made him a little tired but it was helping him  He is here for follow-up  He is also still having some tingling in his fifth finger on both hands  He denies any myelopathic symptoms      PHYSICAL EXAMINATION:  Vital Signs:  Ht 65\"   Wt 200 lb (90.7 kg)   BMI 33.28 kg/m²   Awake alert, x 3  Follows commands x 4  Negative Tinel's at the elbows  No Savannah's  No clonus        REVIEW OF STUDIES:    MRI lumbar spine was reviewed which showed mild degeneration  MRI cervical spine reviewed      ASSESSMENT and PLAN:  78-year-old gentleman here for follow-up  He is doing pretty well  His inner thigh tingling got better  His arms are not bothering him too bad  The C8 nerves look okay  He does have some cervical stenosis higher up  He does not have any myelopathic symptoms  We had a nice discussion  He would like to try different muscle laxer  I wrote him for some Norflex  I will see him back in 6 months for examination  All questions were answered  Patient appreciative        Time spent on counseling/coordination of care:  20 minutes    Total time spent with patient:  20 minutes      Elder Mcknight MD   [facility]  2024  3:48 PM   Dictated but not proofread

## 2024-05-02 NOTE — PROGRESS NOTES
Established patient:  Reason for follow up: 2 month follow up lower back pain     Numeric Rating Scale: (No Pain) 0  to  10 (Worst Pain)        Pain at Present:  0/10       Distribution of Pain:    bilateral states he has N/T in both arms and pinky fingers     Most recent treatments for Current Pain Condition:   Physical Therapy  Response to treatment: some relief    New imaging or testing since your last office visit:      MRI lower back

## 2024-05-13 ENCOUNTER — PATIENT MESSAGE (OUTPATIENT)
Facility: CLINIC | Age: 79
End: 2024-05-13

## 2024-05-14 NOTE — TELEPHONE ENCOUNTER
I reviewed previous changes from 4/2024 TE. Please have him reduce further to 48 units of long acting. Does he have any glucose readings so I can help him adjust his mealtime novolog?

## 2024-05-14 NOTE — TELEPHONE ENCOUNTER
From: Ida Burgess  To: Niurka Romano  Sent: 5/13/2024 4:22 PM CDT  Subject: Hypoglycemia     Hi, I had sent you a message before. I have had multiple episodes of hypoglycemia within the last week or so. Today, about half an hour ago, my sugar was 45.   I have been taking 60 units of lantus in the am and 16 units of Novolog tid. Pls advise.

## 2024-05-14 NOTE — TELEPHONE ENCOUNTER
LOV:   Continue: Jardiance 25mg, Metformin 1000mg BID   Decrease: Lantus to 64 units daily  Increase:  Apidra to 20u with each meal and take it prior to meal . Continue: 1:20 above 120 correction with a reverse scale if premeal glucose below    We reviewed your labs including your lipid panel and urine protein test    Patient has been taking Lantus 60U in the morning and Novolog 16U TID with meals    Patient messaged in 4/7 with Hypoglycemia

## 2024-05-17 RX ORDER — IRBESARTAN 150 MG/1
150 TABLET ORAL DAILY
Qty: 90 TABLET | Refills: 0 | Status: SHIPPED | OUTPATIENT
Start: 2024-05-17

## 2024-05-17 NOTE — TELEPHONE ENCOUNTER
Requested Renewals     Irbesartan 150 MG Oral Tab         Sig: Take 1 tablet (150 mg total) by mouth daily.    Disp: 90 tablet    Refills: 0    Start: 5/17/2024    Class: Normal    Non-formulary    Last ordered: 9 months ago (8/21/2023) by José Briggs MD    Hypertension Medications Protocol [id number]/17/2024 11:05 AM   Protocol Details CMP or BMP in past 12 months    Last BP reading less than 140/90    In person appointment or virtual visit in the past 12 mos or appointment in next 3 mos    EGFRCR or GFRNAA > 50             Future Appointments   Date Time Provider Department Center   5/28/2024  1:40 PM Laila Doyle MD [facility] [address]   7/11/2024  3:00 PM Niurka Romano DO [id number] [facility]   11/5/2024  3:00 PM Elder Mcknight MD [id number] [facility]     LOV: 2/28/24 for Medicare Annual Wellness.    RX sent as it passed protocol.

## 2024-05-28 ENCOUNTER — OFFICE VISIT (OUTPATIENT)
Dept: NEUROLOGY | Facility: CLINIC | Age: 79
End: 2024-05-28

## 2024-05-28 VITALS
BODY MASS INDEX: 34 KG/M2 | SYSTOLIC BLOOD PRESSURE: 122 MMHG | WEIGHT: 202 LBS | HEART RATE: 76 BPM | DIASTOLIC BLOOD PRESSURE: 62 MMHG | RESPIRATION RATE: 16 BRPM

## 2024-05-28 DIAGNOSIS — M51.36 LUMBAR DEGENERATIVE DISC DISEASE: ICD-10-CM

## 2024-05-28 DIAGNOSIS — M50.30 DDD (DEGENERATIVE DISC DISEASE), CERVICAL: Primary | ICD-10-CM

## 2024-05-28 PROCEDURE — 99214 OFFICE O/P EST MOD 30 MIN: CPT | Performed by: OTHER

## 2024-05-28 NOTE — PROGRESS NOTES
Pt states he has good days an bad days. Sometimes he has the pen and needles sensation and sometime numbness.

## 2024-05-28 NOTE — PROGRESS NOTES
[facility] Neurology Outpatient Progress Note  Date of service: 5/28/2024    Assessment:     ICD-10-CM    1. DDD (degenerative disc disease), cervical  M50.30    C5-6 worse   2. Lumbar degenerative disc disease  M51.36       Dizziness/vertigo; resolved  Pain in shoulders and arms; likely cervical radicular symptoms  Gait disorder; multifactorial likely, peripheral neuropathy plays a role     Plan:  MRI c and L spine reviewed with pt  Cont physical therapy   Fall precaution  May increase lyrica 75 mg bid if worsening in pain  Offered emg if interested  See orders and medications filed with this encounter. The patient indicates understanding of these issues and agrees with the plan.  Discussed with patient regarding assessment, care plan   RTC 9-12 months  Pt should go ER for any new or worsening symptoms and contact office     Subjective:   History:  Patient here for a follow-up visit  Pt states he has good days an bad days. Sometimes he has the pen and needles sensation and sometime numbness. Pt saw Dr Mcknight  Per initial visit note:  Ida Burgess is a 78 year old male with past medical history as listed below presents here for initial evaluation of  vertigo, pain in arms; pt states here for follow up from ED on 10/04/23 for vertigo which later resolved with therapy, saw ENT, Pt states having a pain in both shoulders that makes it difficult to sleep. Pain radiates from neck to shoulders, arms. He uses cane for fall precaution.       History/Other:   REVIEW OF SYSTEMS:  A 10-point system was reviewed. Pertinent positives and negatives are noted as above       Current Outpatient Medications:     Irbesartan 150 MG Oral Tab, Take 1 tablet (150 mg total) by mouth daily., Disp: 90 tablet, Rfl: 0    pregabalin 75 MG Oral Cap, Take 1 capsule (75 mg total) by mouth daily., Disp: 90 capsule, Rfl: 1    Niacin ER, Antihyperlipidemic, 500 MG Oral Tab CR, Take 1 tablet (500 mg total) by mouth nightly., Disp: 90 tablet, Rfl: 1     insulin glargine (LANTUS SOLOSTAR) 100 UNIT/ML Subcutaneous Solution Pen-injector, Inject 65 Units into the skin nightly., Disp: 60 mL, Rfl: 1    tamsulosin 0.4 MG Oral Cap, Take 2 capsules (0.8 mg total) by mouth daily., Disp: 180 capsule, Rfl: 1    insulin aspart (NOVOLOG FLEXPEN) 100 Units/mL Subcutaneous Solution Pen-injector, Take 16U with meals and ISF 1:20 >120, Disp: 15 mL, Rfl: 2    furosemide 20 MG Oral Tab, Take 1 tablet (20 mg total) by mouth daily., Disp: 90 tablet, Rfl: 1    Glucose Blood (ACCU-CHEK GUIDE) In Vitro Strip, Test glucose two times a day, Disp: 100 strip, Rfl: 3    Blood Glucose Monitoring Suppl (ACCU-CHEK GUIDE) w/Device Does not apply Kit, 1 Device  in the morning and 1 Device before bedtime., Disp: 1 kit, Rfl: 0    Glucose Blood (ACCU-CHEK GUIDE) In Vitro Strip, Test 4-5 times per day.  Diagnoses: E11.649 and Z79.4, Disp: 400 strip, Rfl: 0    atorvastatin 40 MG Oral Tab, Take 1 tablet (40 mg total) by mouth nightly. (Patient taking differently: Take 0.5 tablets (20 mg total) by mouth nightly.), Disp: 60 tablet, Rfl: 2    Accu-Chek FastClix Lancets Does not apply Misc, 1 Lancet  in the morning and 1 Lancet before bedtime. Test glucose twice daily., Disp: 100 each, Rfl: 5    Empagliflozin (JARDIANCE) 25 MG Oral Tab, Take 1 tablet by mouth daily., Disp: 90 tablet, Rfl: 0    atenolol 25 MG Oral Tab, Take 1 tablet (25 mg total) by mouth daily., Disp: 90 tablet, Rfl: 0    aspirin 81 MG Oral Tab EC, Take 1 tablet (81 mg total) by mouth daily., Disp: , Rfl:     Blood Glucose Monitoring Suppl (ACCU-CHEK GUIDE) w/Device Does not apply Kit, 1 kit by Does not apply route., Disp: , Rfl:     Insulin Pen Needle 31G X 8 MM Does not apply Misc, 4/day, Disp: , Rfl:     Lancets Misc. (ACCU-CHEK FASTCLIX LANCET) Does not apply Kit, , Disp: , Rfl:     Levothyroxine Sodium 75 MCG Oral Tab, Take 1 tablet (75 mcg total) by mouth before breakfast., Disp: , Rfl:     Ferrous Sulfate 325 (65 FE) MG Oral Tab, Take  1 tablet (325 mg total) by mouth daily with breakfast., Disp: , Rfl:     LANTUS SOLOSTAR 100 UNIT/ML Subcutaneous Solution Pen-injector, 45 Units. 45 units each morning, Disp: , Rfl:     Isosorbide Mononitrate ER (IMDUR) 30 MG Oral Tablet 24 Hr, Take 1 tablet (30 mg total) by mouth daily., Disp: , Rfl:     MetFORMIN HCl (GLUCOPHAGE) 500 MG Oral Tab, 1 tablet (500 mg total) 2 (two) times daily with meals., Disp: , Rfl:   Allergies:  Allergies   Allergen Reactions    Bromocriptine ANAPHYLAXIS and OTHER (SEE COMMENTS)    Clindamycin HIVES    Other OTHER (SEE COMMENTS)     Cycloset     Past Medical History:    Atherosclerosis of coronary artery    Heart disease    Hyperthyroidism    Other and unspecified hyperlipidemia    Type II or unspecified type diabetes mellitus without mention of complication, not stated as uncontrolled    Unspecified essential hypertension    Vertigo     Past Surgical History:   Procedure Laterality Date    Cabg  1996    4x bypass    Cataract  Ten yrs approx    Bilateral    Colonoscopy  Not sure    Hemorrhoidectomy  Not sure    Sinus surgery         Social History:  Social History     Tobacco Use    Smoking status: Never    Smokeless tobacco: Never   Substance Use Topics    Alcohol use: Never     Family History   Problem Relation Age of Onset    Diabetes Mother     Diabetes Father     Hypertension Paternal Grandmother         Sisters    Heart Disease Neg     High Blood Pressure Neg       Objective:   Neurological Examination:  /62   Pulse 76   Resp 16   Wt 202 lb (91.6 kg)   BMI 33.61 kg/m²   Language: normal   Speech: no dysarthria  CN: II-XII intact  Motor strength: 5/5 all extremities  Tone: normal  DTRs: + symmetric  Coordination: Normal FTN  Sensory: symmetric   Gait: unsteady, uses cane    Test reviewed on 5/28/2024      Laila \"Fermín\" MD Vivek  Neurology  [facility]  5/28/2024, 1:49 PM  CC: Ashley Anna DO

## 2024-05-28 NOTE — PATIENT INSTRUCTIONS
Refill policies:    Allow 2-3 business days for refills; controlled substances may take longer.  Contact your pharmacy at least 5 days prior to running out of medication and have them send an electronic request or submit request through the “request refill” option in your [employer] account.  Refills are not addressed on weekends; covering physicians do not authorize routine medications on weekends.  No narcotics or controlled substances are refilled after noon on Fridays or by on call physicians.  By law, narcotics must be electronically prescribed.  A 30 day supply with no refills is the maximum allowed.  If your prescription is due for a refill, you may be due for a follow up appointment.  To best provide you care, patients receiving routine medications need to be seen at least once a year.  Patients receiving narcotic/controlled substance medications need to be seen at least once every 3 months.  In the event that your preferred pharmacy does not have the requested medication in stock (e.g. Backordered), it is your responsibility to find another pharmacy that has the requested medication available.  We will gladly send a new prescription to that pharmacy at your request.    Scheduling Tests:    If your physician has ordered radiology tests such as MRI or CT scans, please contact Central Scheduling at [phone] right away to schedule the test.  Once scheduled, the [facility] Centralized Referral Team will work with your insurance carrier to obtain pre-certification or prior authorization.  Depending on your insurance carrier, approval may take 3-10 days.  It is highly recommended patients assure they have received an authorization before having a test performed.  If test is done without insurance authorization, patient may be responsible for the entire amount billed.      Precertification and Prior Authorizations:  If your physician has recommended that you have a procedure or additional testing performed the [facility]  Centralized Referral Team will contact your insurance carrier to obtain pre-certification or prior authorization.    You are strongly encouraged to contact your insurance carrier to verify that your procedure/test has been approved and is a COVERED benefit.  Although the [facility] Centralized Referral Team does its due diligence, the insurance carrier gives the disclaimer that \"Although the procedure is authorized, this does not guarantee payment.\"    Ultimately the patient is responsible for payment.   Thank you for your understanding in this matter.  Paperwork Completion:  If you require FMLA or disability paperwork for your recovery, please make sure to either drop it off or have it faxed to our office at [phone]. Be sure the form has your name and date of birth on it.  The form will be faxed to our Forms Department and they will complete it for you.  There is a 25$ fee for all forms that need to be filled out.  Please be aware there is a 10-14 day turnaround time.  You will need to sign a release of information (COY) form if your paperwork does not come with one.  You may call the Forms Department with any questions at [phone].  Their fax number is [phone].

## 2024-06-12 ENCOUNTER — TELEPHONE (OUTPATIENT)
Facility: CLINIC | Age: 79
End: 2024-06-12

## 2024-06-12 DIAGNOSIS — E78.2 MIXED HYPERLIPIDEMIA: ICD-10-CM

## 2024-06-12 DIAGNOSIS — Z79.4 TYPE 2 DIABETES MELLITUS WITHOUT COMPLICATION, WITH LONG-TERM CURRENT USE OF INSULIN (HCC): ICD-10-CM

## 2024-06-12 DIAGNOSIS — E11.9 TYPE 2 DIABETES MELLITUS WITHOUT COMPLICATION, WITH LONG-TERM CURRENT USE OF INSULIN (HCC): ICD-10-CM

## 2024-06-12 NOTE — TELEPHONE ENCOUNTER
RN phoned patient to discuss    Patient would like a 90 day supply of insulin, would also like other rx to go through [facility] [employer] mail order     Patient states that the novolog has been working well    Pended: Metformin, Lantus, Novolog and Atorvastatin 90 day supply    LOV:     RTC:    FU:    Month Supply [place] day supply

## 2024-06-12 NOTE — TELEPHONE ENCOUNTER
24-Patient called into clinic today.  Verified name and .  Patient asked to speak to a nurse as he would like to know if he can get a 90 day supply of insulin.  Please call patient back.

## 2024-06-13 RX ORDER — ATORVASTATIN CALCIUM 40 MG/1
40 TABLET, FILM COATED ORAL NIGHTLY
Qty: 90 TABLET | Refills: 1 | Status: SHIPPED | OUTPATIENT
Start: 2024-06-13 | End: 2024-12-10

## 2024-06-13 RX ORDER — ISOSORBIDE MONONITRATE 30 MG/1
30 TABLET, EXTENDED RELEASE ORAL DAILY
Qty: 90 TABLET | Refills: 1 | Status: CANCELLED | OUTPATIENT
Start: 2024-06-13

## 2024-06-13 RX ORDER — POTASSIUM CHLORIDE 750 MG/1
10 TABLET, EXTENDED RELEASE ORAL 2 TIMES DAILY
Qty: 90 TABLET | Refills: 1 | Status: CANCELLED | OUTPATIENT
Start: 2024-06-13

## 2024-06-13 RX ORDER — INSULIN ASPART 100 [IU]/ML
INJECTION, SOLUTION INTRAVENOUS; SUBCUTANEOUS
Qty: 48 ML | Refills: 2 | Status: SHIPPED | OUTPATIENT
Start: 2024-06-13

## 2024-06-13 RX ORDER — INSULIN GLARGINE 100 [IU]/ML
65 INJECTION, SOLUTION SUBCUTANEOUS NIGHTLY
Qty: 60 ML | Refills: 1 | Status: SHIPPED | OUTPATIENT
Start: 2024-06-13 | End: 2024-12-10

## 2024-06-13 NOTE — TELEPHONE ENCOUNTER
Spoke with patient on telephone, reviewed below- states he is ok with following up with these two with PCP- he will not run out of medication.    Closing this encounter.

## 2024-06-13 NOTE — TELEPHONE ENCOUNTER
Is he running out of the potassium or mononitrate? If so, I can refill until he gets in touch with his PCP.

## 2024-06-14 ENCOUNTER — TELEPHONE (OUTPATIENT)
Facility: CLINIC | Age: 79
End: 2024-06-14

## 2024-06-14 NOTE — TELEPHONE ENCOUNTER
6/14/24 rcvd via fax from [facility]    90 day refill request    Fax in [address]

## 2024-06-14 NOTE — TELEPHONE ENCOUNTER
Fax request for Isosorbide and Klor-Con.    See TE dated 6/12/24- pt will get these from PCP.    Closing this encounter.

## 2024-06-17 ENCOUNTER — TELEPHONE (OUTPATIENT)
Dept: FAMILY MEDICINE CLINIC | Facility: CLINIC | Age: 79
End: 2024-06-17

## 2024-06-17 NOTE — TELEPHONE ENCOUNTER
Pt phoned clinic due to his Metformin sig is different than previously prescribed.    New Rx that was sent on 6/13/24 sig states \" Route: Take 1 tablet (500 mg total) by mouth 2 (two) times daily with meals. - Oral \"    Pt states that he normally takes a total of 2000 mg total daily. Metformin 1000 mg BID.    Confirmed with pt to continue taking normal dosage (dosage above) and I will confirm with Dr. Romano about the current sig.    Pt verbalized understanding.      LOV 4/04/24 pt instructions  Continue: Jardiance 25mg, Metformin 1000mg BID   Decrease: Lantus to 64 units daily  Increase:  Apidra to 20u with each meal and take it prior to meal . Continue: 1:20 above 120 correction with a reverse scale if premeal glucose below    We reviewed your labs including your lipid panel and urine protein test  We will plan on following up in 3 months  Please keep me updated if your glucose is consistely over 250 or less than 70

## 2024-06-17 NOTE — TELEPHONE ENCOUNTER
Patient phoned back- pharmacy only distributed 180 tablets    Confirmed patient is to take 500 mg, 2 tablets BID    When in need of a refill- new prescription will be sent with updated SIG and with quantity of 360.    Patient verbalized understanding.     Closing Encounter.

## 2024-06-27 DIAGNOSIS — I27.20 PULMONARY HTN (HCC): Primary | ICD-10-CM

## 2024-06-27 DIAGNOSIS — E11.42 POLYNEUROPATHY DUE TO TYPE 2 DIABETES MELLITUS (HCC): ICD-10-CM

## 2024-06-27 NOTE — TELEPHONE ENCOUNTER
Pharmacy suggested that he call PCP for refills on the Klor-Con and Isosorbide-patient is out of these medications and would like to have these sent to the local [place].  Neurologist wants him to double the Pregabalin-he will need a new presccription sent for that medication.

## 2024-06-27 NOTE — TELEPHONE ENCOUNTER
See TE from 6/12/24. Endo wants PCP to prescribe klor-con and isosorbide.     Isosorbide Mononitrate     Dispensed Written Strength Quantity Refills Days Supply Provider Pharmacy   ISOSORB MONO 30MG ER TAB 02/13/2024  30 90 tablet  90 Emily Madrigal MD [facility]     Potassium Chloride [facility]     Dispensed Written Strength Quantity Refills Days Supply Provider Pharmacy   KLOR-CON M10 10MEQ ER TAB 03/14/2024  10 180 tablet  90 Emily Madrigal MD [facility]     See OV with Dr. Doyle on 5/28/24  Plan:  MRI c and L spine reviewed with pt  Cont physical therapy   Fall precaution  May increase lyrica 75 mg bid if worsening in pain  Offered emg if interested  See orders and medications filed with this encounter. The patient indicates understanding of these issues and agrees with the plan.  Discussed with patient regarding assessment, care plan   RTC 9-12 months    RX pended.

## 2024-06-29 RX ORDER — POTASSIUM CHLORIDE 750 MG/1
10 TABLET, FILM COATED, EXTENDED RELEASE ORAL 2 TIMES DAILY
Qty: 180 TABLET | Refills: 0 | Status: SHIPPED | OUTPATIENT
Start: 2024-06-29

## 2024-06-29 RX ORDER — PREGABALIN 75 MG/1
75 CAPSULE ORAL 2 TIMES DAILY
Qty: 180 CAPSULE | Refills: 0 | Status: SHIPPED | OUTPATIENT
Start: 2024-06-29

## 2024-06-29 RX ORDER — ISOSORBIDE MONONITRATE 30 MG/1
30 TABLET, EXTENDED RELEASE ORAL DAILY
Qty: 90 TABLET | Refills: 0 | Status: SHIPPED | OUTPATIENT
Start: 2024-06-29

## 2024-07-02 RX ORDER — ISOSORBIDE MONONITRATE 30 MG/1
30 TABLET, EXTENDED RELEASE ORAL DAILY
Qty: 90 TABLET | Refills: 0 | OUTPATIENT
Start: 2024-07-02

## 2024-07-02 NOTE — TELEPHONE ENCOUNTER
Requested Prescriptions     Pending Prescriptions Disp Refills    isosorbide mononitrate ER 30 MG Oral Tablet 24 Hr 90 tablet 0     Sig: Take 1 tablet (30 mg total) by mouth daily.       LOV: 2/28/28  RTC:   Last Relevant Labs: 3/26/24      Future Appointments   Date Time Provider Department Center   7/11/2024  3:00 PM Niurka Romano DO [id number] [place]   11/5/2024  3:00 PM Elder Mcknight MD [id number] [place]

## 2024-07-11 ENCOUNTER — OFFICE VISIT (OUTPATIENT)
Facility: CLINIC | Age: 79
End: 2024-07-11
Payer: MEDICARE

## 2024-07-11 VITALS
DIASTOLIC BLOOD PRESSURE: 72 MMHG | BODY MASS INDEX: 33.66 KG/M2 | HEIGHT: 65 IN | HEART RATE: 54 BPM | WEIGHT: 202 LBS | OXYGEN SATURATION: 97 % | SYSTOLIC BLOOD PRESSURE: 130 MMHG

## 2024-07-11 DIAGNOSIS — E11.9 TYPE 2 DIABETES MELLITUS WITHOUT COMPLICATION, WITH LONG-TERM CURRENT USE OF INSULIN (HCC): Primary | ICD-10-CM

## 2024-07-11 DIAGNOSIS — Z79.4 TYPE 2 DIABETES MELLITUS WITH HYPOGLYCEMIA WITHOUT COMA, WITH LONG-TERM CURRENT USE OF INSULIN (HCC): ICD-10-CM

## 2024-07-11 DIAGNOSIS — E03.9 HYPOTHYROIDISM, UNSPECIFIED TYPE: ICD-10-CM

## 2024-07-11 DIAGNOSIS — E11.649 TYPE 2 DIABETES MELLITUS WITH HYPOGLYCEMIA WITHOUT COMA, WITH LONG-TERM CURRENT USE OF INSULIN (HCC): ICD-10-CM

## 2024-07-11 DIAGNOSIS — Z79.4 TYPE 2 DIABETES MELLITUS WITHOUT COMPLICATION, WITH LONG-TERM CURRENT USE OF INSULIN (HCC): Primary | ICD-10-CM

## 2024-07-11 LAB — HEMOGLOBIN A1C: 7.4 % (ref 4.3–5.6)

## 2024-07-11 PROCEDURE — 83036 HEMOGLOBIN GLYCOSYLATED A1C: CPT | Performed by: STUDENT IN AN ORGANIZED HEALTH CARE EDUCATION/TRAINING PROGRAM

## 2024-07-11 PROCEDURE — 99213 OFFICE O/P EST LOW 20 MIN: CPT | Performed by: STUDENT IN AN ORGANIZED HEALTH CARE EDUCATION/TRAINING PROGRAM

## 2024-07-11 RX ORDER — BLOOD SUGAR DIAGNOSTIC
STRIP MISCELLANEOUS
Qty: 400 STRIP | Refills: 0 | Status: SHIPPED | OUTPATIENT
Start: 2024-07-11

## 2024-07-11 NOTE — PROGRESS NOTES
[facility] Note    Name: Ida Burgess    Date: 2024    Ida Burgess is a 79 year old male who presents for evaluation of T2DM management.     Chief complaint: Diabetes (Type 2 DM, Fingerstick no concerns /Last A1c 7.5 24, Eye exam scheduled in Fall /A1C will be done in office today )       Subjective:    Initial HPI consult in 2023  Initially started on oral medications and then eventually added on insulin     DM hx:  -Diagnosed with diabetes in   -Family history-  not known   -Re: potential DM medication contraindication (if positive, checkbox selected):  [] history of pancreatitis  [] personal/fam hx of medullary thyroid ca/MEN2  [x] UTI/yeast infxn    -Presence of associated DM complications (if positive, checkbox selected):  [x] Macrovascular complications (CAD/CVA/PAD)  [] Neuropathy  [] Retinopathy  [x] Nephropathy  [x] HTN  [x] Hyperlipidemia  [] Stroke/TIA  [] Gastroparesis       Interval Hx 2024  Saw Hendrickson 2024  - Increase Lantus to 48 u daily 2024 > 52 units but takes 68-70 units     Current Regimen for diabetes:   Oral/Injectable medications: Jardiance 25mg, Metformin 1000mg BID   Basal:  Lantus 68-70 units qAM . Injects in abdomen  Prandial:  Apidra 18u with each meal and takes it prior to meal . Also on a 1:20 above 120 correction with a reverse scale if premeal glucose below 100. Injects in abdomen  Missed doses: very rare     2024  Noted symmptoms when he was low and alerted our office  Current regimen: Continue: Jardiance 25mg, Metformin 1000mg BID   Decrease: Lantus to 48 units daily  Novolog 18u with each meal and take it prior to meal .   Continue: 1:20 above 120 correction with a reverse scale if premeal glucose below    Blood Glucose log reviewed. Checking 3 times per day. Morning/fastin-170, episodes of hypoglycemia: Yes69  5p    Previously trialed/failed DM meds:   -He stopped ozempic, bydureon and rybelsus due to GI upset  -He stopped  actos due to concern about bladder cancer     History/Other:    Allergies, PMH, SocHx and FHx reviewed and updated as appropriate in Epic on    Glucose Blood (ACCU-CHEK GUIDE) In Vitro Strip Test 4-5 times per day.  Diagnoses: E11.649 and Z79.4 400 strip 0    isosorbide mononitrate ER 30 MG Oral Tablet 24 Hr Take 1 tablet (30 mg total) by mouth daily. 90 tablet 0    Potassium Chloride ER 10 MEQ Oral Tab CR Take 1 tablet (10 mEq total) by mouth 2 (two) times daily. 180 tablet 0    pregabalin (LYRICA) 75 MG Oral Cap Take 1 capsule (75 mg total) by mouth 2 (two) times daily. 180 capsule 0    insulin aspart (NOVOLOG FLEXPEN) 100 Units/mL Subcutaneous Solution Pen-injector Take 16U with meals and ISF 1:20 >120 48 mL 2    insulin glargine (LANTUS SOLOSTAR) 100 UNIT/ML Subcutaneous Solution Pen-injector Inject 65 Units into the skin nightly. 60 mL 1    metFORMIN 500 MG Oral Tab Take 1 tablet (500 mg total) by mouth 2 (two) times daily with meals. 360 tablet 1    atorvastatin 40 MG Oral Tab Take 1 tablet (40 mg total) by mouth nightly. 90 tablet 1    Irbesartan 150 MG Oral Tab Take 1 tablet (150 mg total) by mouth daily. 90 tablet 0    pregabalin 75 MG Oral Cap Take 1 capsule (75 mg total) by mouth daily. 90 capsule 1    Niacin ER, Antihyperlipidemic, 500 MG Oral Tab CR Take 1 tablet (500 mg total) by mouth nightly. 90 tablet 1    tamsulosin 0.4 MG Oral Cap Take 2 capsules (0.8 mg total) by mouth daily. 180 capsule 1    furosemide 20 MG Oral Tab Take 1 tablet (20 mg total) by mouth daily. 90 tablet 1    Glucose Blood (ACCU-CHEK GUIDE) In Vitro Strip Test glucose two times a day 100 strip 3    Blood Glucose Monitoring Suppl (ACCU-CHEK GUIDE) w/Device Does not apply Kit 1 Device  in the morning and 1 Device before bedtime. 1 kit 0    Accu-Chek FastClix Lancets Does not apply Misc 1 Lancet  in the morning and 1 Lancet before bedtime. Test glucose twice daily. 100 each 5    Empagliflozin (JARDIANCE) 25 MG Oral Tab Take 1 tablet  by mouth daily. 90 tablet 0    atenolol 25 MG Oral Tab Take 1 tablet (25 mg total) by mouth daily. 90 tablet 0    aspirin 81 MG Oral Tab EC Take 1 tablet (81 mg total) by mouth daily.      Blood Glucose Monitoring Suppl (ACCU-CHEK GUIDE) w/Device Does not apply Kit 1 kit by Does not apply route.      Insulin Pen Needle 31G X 8 MM Does not apply Misc 4/day      Lancets Misc. (ACCU-CHEK FASTCLIX LANCET) Does not apply Kit       Levothyroxine Sodium 75 MCG Oral Tab Take 1 tablet (75 mcg total) by mouth before breakfast.      Ferrous Sulfate 325 (65 FE) MG Oral Tab Take 1 tablet (325 mg total) by mouth daily with breakfast.      LANTUS SOLOSTAR 100 UNIT/ML Subcutaneous Solution Pen-injector 45 Units. 45 units each morning       Allergies   Allergen Reactions    Bromocriptine ANAPHYLAXIS and OTHER (SEE COMMENTS)    Clindamycin HIVES    Other OTHER (SEE COMMENTS)     Cycloset     Current Outpatient Medications   Medication Sig Dispense Refill    Glucose Blood (ACCU-CHEK GUIDE) In Vitro Strip Test 4-5 times per day.  Diagnoses: E11.649 and Z79.4 400 strip 0    isosorbide mononitrate ER 30 MG Oral Tablet 24 Hr Take 1 tablet (30 mg total) by mouth daily. 90 tablet 0    Potassium Chloride ER 10 MEQ Oral Tab CR Take 1 tablet (10 mEq total) by mouth 2 (two) times daily. 180 tablet 0    pregabalin (LYRICA) 75 MG Oral Cap Take 1 capsule (75 mg total) by mouth 2 (two) times daily. 180 capsule 0    insulin aspart (NOVOLOG FLEXPEN) 100 Units/mL Subcutaneous Solution Pen-injector Take 16U with meals and ISF 1:20 >120 48 mL 2    insulin glargine (LANTUS SOLOSTAR) 100 UNIT/ML Subcutaneous Solution Pen-injector Inject 65 Units into the skin nightly. 60 mL 1    metFORMIN 500 MG Oral Tab Take 1 tablet (500 mg total) by mouth 2 (two) times daily with meals. 360 tablet 1    atorvastatin 40 MG Oral Tab Take 1 tablet (40 mg total) by mouth nightly. 90 tablet 1    Irbesartan 150 MG Oral Tab Take 1 tablet (150 mg total) by mouth daily. 90  tablet 0    pregabalin 75 MG Oral Cap Take 1 capsule (75 mg total) by mouth daily. 90 capsule 1    Niacin ER, Antihyperlipidemic, 500 MG Oral Tab CR Take 1 tablet (500 mg total) by mouth nightly. 90 tablet 1    tamsulosin 0.4 MG Oral Cap Take 2 capsules (0.8 mg total) by mouth daily. 180 capsule 1    furosemide 20 MG Oral Tab Take 1 tablet (20 mg total) by mouth daily. 90 tablet 1    Glucose Blood (ACCU-CHEK GUIDE) In Vitro Strip Test glucose two times a day 100 strip 3    Blood Glucose Monitoring Suppl (ACCU-CHEK GUIDE) w/Device Does not apply Kit 1 Device  in the morning and 1 Device before bedtime. 1 kit 0    Accu-Chek FastClix Lancets Does not apply Misc 1 Lancet  in the morning and 1 Lancet before bedtime. Test glucose twice daily. 100 each 5    Empagliflozin (JARDIANCE) 25 MG Oral Tab Take 1 tablet by mouth daily. 90 tablet 0    atenolol 25 MG Oral Tab Take 1 tablet (25 mg total) by mouth daily. 90 tablet 0    aspirin 81 MG Oral Tab EC Take 1 tablet (81 mg total) by mouth daily.      Blood Glucose Monitoring Suppl (ACCU-CHEK GUIDE) w/Device Does not apply Kit 1 kit by Does not apply route.      Insulin Pen Needle 31G X 8 MM Does not apply Misc 4/day      Lancets Misc. (ACCU-CHEK FASTCLIX LANCET) Does not apply Kit       Levothyroxine Sodium 75 MCG Oral Tab Take 1 tablet (75 mcg total) by mouth before breakfast.      Ferrous Sulfate 325 (65 FE) MG Oral Tab Take 1 tablet (325 mg total) by mouth daily with breakfast.      LANTUS SOLOSTAR 100 UNIT/ML Subcutaneous Solution Pen-injector 45 Units. 45 units each morning       Past Medical History:    Atherosclerosis of coronary artery    Heart disease    Hyperthyroidism    Other and unspecified hyperlipidemia    Type II or unspecified type diabetes mellitus without mention of complication, not stated as uncontrolled    Unspecified essential hypertension    Vertigo     Past Surgical History:   Procedure Laterality Date    Cabg  1996    4x bypass    Cataract  Ten yrs  approx    Bilateral    Colonoscopy  Not sure    Hemorrhoidectomy  Not sure    Sinus surgery         Social History     Socioeconomic History    Marital status:    Tobacco Use    Smoking status: Never    Smokeless tobacco: Never   Vaping Use    Vaping status: Never Used   Substance and Sexual Activity    Alcohol use: Never    Drug use: Never   Other Topics Concern    Caffeine Concern Yes     Comment: tea daily    Exercise Yes     Comment: biking    Seat Belt No    Special Diet No    Stress Concern No    Weight Concern No     Social Determinants of Health     Physical Activity: Inactive (10/14/2020)    [facility]    Exercise Vital Sign     Days of Exercise per Week: 0 days     Minutes of Exercise per Session: 0 min     Family History   Problem Relation Age of Onset    Diabetes Mother     Diabetes Father     Hypertension Paternal Grandmother         Sisters    Heart Disease Neg     High Blood Pressure Neg        ROS/Exam    REVIEW OF SYSTEMS: Ten point review of systems has been performed and is otherwise negative and/or non-contributory, except as described above.     VITALS  Vitals:    07/11/24 1458   BP: 130/72   Pulse: 54   SpO2: 97%   Weight: 202 lb (91.6 kg)   Height: 5' 5\" (1.651 m)       Wt Readings from Last 6 Encounters:   07/11/24 202 lb (91.6 kg)   05/28/24 202 lb (91.6 kg)   05/02/24 200 lb (90.7 kg)   03/05/24 200 lb (90.7 kg)   02/28/24 200 lb (90.7 kg)   01/02/24 200 lb (90.7 kg)       PHYSICAL EXAM  CONSTITUTIONAL:  awake, alert, cooperative, no apparent distress, and appears stated age  PSYCH: normal affect  LUNGS: breathing comfortably  CARDIOVASCULAR:  regular rate   ENT:  no palpable thyroid nodules    Bilateral barefoot skin diabetic exam is normal, visualized feet and the appearance is normal.  Bilateral monofilament/sensation of both feet is normal.        Labs/Imaging: Pertinent imaging reviewed.    Overall glucose control:  Lab Results    Component Value Date    A1C 7.4 (A) 07/11/2024    A1C 7.5 (A) 04/04/2024    A1C 7.6 (A) 01/02/2024    A1C 7.8 (A) 10/03/2023    A1C 7.1 12/02/2022   TSH 3.63 8/2023, Cr 0.81 8/2023, Ca 8.8 8/2023, ALP 76 8/2023     Assessment & Plan:     ICD-10-CM    1. Type 2 diabetes mellitus without complication, with long-term current use of insulin (HCC)  E11.9 POC Hgb A1C    Z79.4       2. Type 2 diabetes mellitus with hypoglycemia without coma, with long-term current use of insulin ([facility])  E11.649 Glucose Blood (ACCU-CHEK GUIDE) In Vitro Strip    Z79.4       3. Hypothyroidism, unspecified type  E03.9 TSH and Free T4 [E]          Ida Burgess is a pleasant 79 year old male here for evaluation of:    #Diabetes- PMHx of Type 2 diabetes mellitus diagnosed in 1986.        Last A1c value was 7.4% done 7/11/2024.  Goal <7%. Importance of better glucose control in preventing onset/progression of end-organ damage discussed, as well as goals of therapy and clinical significance of A1C.     - med changes: -Continue Lantus 48 units daily with Novolog 18 u with meals and correction scale  - Patient to inform us if glucose is consistently greater than 250 or less than 70  - Continue checking glucose with glucometer    -Surveillance for Diabetes Complications & Risks  Foot exam/neuropathy: Last Foot Exam: 07/11/24      Retinopathy screening: Last Dilated Eye Exam: 02/07/24  No data recorded  CKD/Nephropathy screening:   Lab Results   Component Value Date    EGFRCR 85 10/04/2023    MICROALBCREA 147.2 (H) 03/26/2024      Blood pressure control:   BP Readings from Last 1 Encounters:   07/11/24 130/72   BP Meds: atenolol Tabs - 25 MG; furosemide Tabs - 20 MG; Irbesartan Tabs - 150 MG     Hyperlipidemia/Lipids:   Lab Results   Component Value Date    LDL 47 03/26/2024    TRIG 98 03/26/2024   Cholesterol Meds: atorvastatin Tabs - 40 MG; Niacin ER (Antihyperlipidemic) Tbcr - 500 MG      Hpothyroidisim  Endorses compliance with his LT4 75mcg    States that he has been on this medication for very long time and it was started by his endocrinologist been on this for a long time  Repeat labs prior to our follow up visit     Return in about 3 months (around 10/11/2024).    7/14/2024  Niurka Romano DO    A total of 20 minutes was spent today on obtaining history, reviewing pertinent labs, evaluating patient, providing multiple treatment options, reinforcing diet/exercise and compliance, and completing documentation.      Note to patient: The 21 Century Cures Act makes medical notes like these available to patients in the interest of transparency. However, be advised this is a medical document. It is intended as peer to peer communication. It is written in medical language and may contain abbreviations or verbiage that are unfamiliar. It may appear blunt or direct. Medical documents are intended to carry relevant information, facts as evident, and the clinical opinion of the practitioner.

## 2024-07-11 NOTE — PATIENT INSTRUCTIONS
Return Visit   [ x ] Physician in 3 months   [  ] In person or video visit  [  ] In person only    [ x ] After visit summary   [ x ] Placed labs/imaging.     It was great seeing you today!    Today we discussed your diabetes and thyroid:  -Please continue your levothyroxine and repeat your thyroid labs 9/2024 onwards prior to our follow up  -Continue Lantus 48 units daily with Novolog 18 u with meals and correction scale  -If you notice glucose is less than 70 or greater than 250, please inform our clinic  -I will plan on seeing you in 3 months     Take care!  -Dr. Romano

## 2024-07-25 RX ORDER — IRBESARTAN 150 MG/1
150 TABLET ORAL DAILY
Qty: 90 TABLET | Refills: 0 | Status: SHIPPED | OUTPATIENT
Start: 2024-07-25

## 2024-07-25 NOTE — TELEPHONE ENCOUNTER
Requested Prescriptions     Pending Prescriptions Disp Refills    IRBESARTAN 150 MG Oral Tab [Pharmacy Med Name: IRBESARTAN TAB 150MG] 90 tablet 0     Sig: TAKE 1 TABLET DAILY       LOV: 2/28/24  RTC:   Last Relevant Labs: 3/26/24  Filled: 5/27/24 #90 with 0 refills    Future Appointments   Date Time Provider Department Center   10/15/2024  3:00 PM Niurka Romano DO [id number] [facility] [place]   11/5/2024  3:00 PM Elder Mcknight MD ENINAPER2 [facility] [place]

## 2024-07-31 ENCOUNTER — TELEPHONE (OUTPATIENT)
Facility: CLINIC | Age: 79
End: 2024-07-31

## 2024-07-31 DIAGNOSIS — Z79.4 TYPE 2 DIABETES MELLITUS WITHOUT COMPLICATION, WITH LONG-TERM CURRENT USE OF INSULIN (HCC): ICD-10-CM

## 2024-07-31 DIAGNOSIS — E11.9 TYPE 2 DIABETES MELLITUS WITHOUT COMPLICATION, WITH LONG-TERM CURRENT USE OF INSULIN (HCC): ICD-10-CM

## 2024-08-05 ENCOUNTER — TELEPHONE (OUTPATIENT)
Dept: FAMILY MEDICINE CLINIC | Facility: CLINIC | Age: 79
End: 2024-08-05

## 2024-08-05 NOTE — TELEPHONE ENCOUNTER
Patient will be traveling to [place] and would like to know when vaccines will need to be done before leaving?  Patient will be leaving 09/28/2024.    Please call to advise.  Ph. [phone]

## 2024-08-06 ENCOUNTER — TELEPHONE (OUTPATIENT)
Dept: FAMILY MEDICINE CLINIC | Facility: CLINIC | Age: 79
End: 2024-08-06

## 2024-08-06 DIAGNOSIS — M79.605 LEG PAIN, BILATERAL: Primary | ICD-10-CM

## 2024-08-06 DIAGNOSIS — M79.604 LEG PAIN, BILATERAL: Primary | ICD-10-CM

## 2024-08-06 NOTE — TELEPHONE ENCOUNTER
Patient calling, patient requesting new referral for PHYSICAL THERAPY-patient was using [facility] previously. Patient has numbness,tingling sensation of legs

## 2024-08-06 NOTE — TELEPHONE ENCOUNTER
Patient is requesting an order for physical therapy to [facility].    There was an order for this placed in January for neck pain and vertigo.    Last OV 2/28/24    Patient c/o numbness/tingling sensation in legs.    Ok for new order to physical therapy or schedule appt to be evaluated?

## 2024-08-20 ENCOUNTER — APPOINTMENT (OUTPATIENT)
Dept: OTHER | Facility: HOSPITAL | Age: 79
End: 2024-08-20
Attending: PREVENTIVE MEDICINE

## 2024-08-26 ENCOUNTER — TELEPHONE (OUTPATIENT)
Dept: FAMILY MEDICINE CLINIC | Facility: CLINIC | Age: 79
End: 2024-08-26

## 2024-08-26 RX ORDER — LEVOTHYROXINE SODIUM 75 UG/1
75 TABLET ORAL
Qty: 90 TABLET | Refills: 0 | Status: SHIPPED | OUTPATIENT
Start: 2024-08-26

## 2024-08-26 NOTE — TELEPHONE ENCOUNTER
Patient has appointment with Dr. Anna on 8/30/24, can discuss then. I don't see any recent hepatitis titers done    Future Appointments   Date Time Provider Department Center   8/30/2024  8:00 AM Ashley Anna DO [facility] [address]   10/15/2024  3:00 PM Niurka Romano DO [id number] [facility] [place]   11/5/2024  3:00 PM Elder Mcknight MD ENINAPER2 [facility] [place]

## 2024-08-26 NOTE — TELEPHONE ENCOUNTER
Requested Prescriptions     Pending Prescriptions Disp Refills    levothyroxine 75 MCG Oral Tab 90 tablet 0     Sig: Take 1 tablet (75 mcg total) by mouth before breakfast.       LOV: 2/28/24  RTC:   Last Relevant Labs: 3/26/24  Filled: 10/7/19 #10/7/19 with 0 refills    Future Appointments   Date Time Provider Department Center   8/30/2024  8:00 AM Ashley Anna DO [facility] [address]   10/15/2024  3:00 PM Niurka Romano DO [id number] [facility] [place]   11/5/2024  3:00 PM Elder Mcknight MD ENINAPER2 [facility] [place]

## 2024-08-30 ENCOUNTER — LAB ENCOUNTER (OUTPATIENT)
Dept: LAB | Age: 79
End: 2024-08-30
Attending: STUDENT IN AN ORGANIZED HEALTH CARE EDUCATION/TRAINING PROGRAM
Payer: MEDICARE

## 2024-08-30 ENCOUNTER — OFFICE VISIT (OUTPATIENT)
Dept: FAMILY MEDICINE CLINIC | Facility: CLINIC | Age: 79
End: 2024-08-30
Payer: MEDICARE

## 2024-08-30 ENCOUNTER — TELEPHONE (OUTPATIENT)
Dept: WOUND CARE | Facility: HOSPITAL | Age: 79
End: 2024-08-30

## 2024-08-30 VITALS
SYSTOLIC BLOOD PRESSURE: 128 MMHG | BODY MASS INDEX: 33.66 KG/M2 | DIASTOLIC BLOOD PRESSURE: 72 MMHG | WEIGHT: 202 LBS | HEART RATE: 50 BPM | HEIGHT: 65 IN | RESPIRATION RATE: 16 BRPM | TEMPERATURE: 97 F | OXYGEN SATURATION: 96 %

## 2024-08-30 DIAGNOSIS — E03.9 HYPOTHYROIDISM, UNSPECIFIED TYPE: ICD-10-CM

## 2024-08-30 DIAGNOSIS — S21.202A WOUND OF LEFT SIDE OF BACK, INITIAL ENCOUNTER: ICD-10-CM

## 2024-08-30 DIAGNOSIS — Z79.4 TYPE 2 DIABETES MELLITUS WITHOUT COMPLICATION, WITH LONG-TERM CURRENT USE OF INSULIN (HCC): ICD-10-CM

## 2024-08-30 DIAGNOSIS — M79.89 LEG SWELLING: ICD-10-CM

## 2024-08-30 DIAGNOSIS — M79.89 LEG SWELLING: Primary | ICD-10-CM

## 2024-08-30 DIAGNOSIS — E11.9 TYPE 2 DIABETES MELLITUS WITHOUT COMPLICATION, WITH LONG-TERM CURRENT USE OF INSULIN (HCC): ICD-10-CM

## 2024-08-30 LAB
ANION GAP SERPL CALC-SCNC: 5 MMOL/L (ref 0–18)
BUN BLD-MCNC: 18 MG/DL (ref 9–23)
CALCIUM BLD-MCNC: 9.8 MG/DL (ref 8.7–10.4)
CHLORIDE SERPL-SCNC: 106 MMOL/L (ref 98–112)
CO2 SERPL-SCNC: 30 MMOL/L (ref 21–32)
CREAT BLD-MCNC: 0.86 MG/DL
EGFRCR SERPLBLD CKD-EPI 2021: 88 ML/MIN/1.73M2 (ref 60–?)
FASTING STATUS PATIENT QL REPORTED: YES
GLUCOSE BLD-MCNC: 119 MG/DL (ref 70–99)
NT-PROBNP SERPL-MCNC: 444 PG/ML (ref ?–450)
OSMOLALITY SERPL CALC.SUM OF ELEC: 295 MOSM/KG (ref 275–295)
POTASSIUM SERPL-SCNC: 4.5 MMOL/L (ref 3.5–5.1)
SODIUM SERPL-SCNC: 141 MMOL/L (ref 136–145)
T4 FREE SERPL-MCNC: 1.4 NG/DL (ref 0.8–1.7)
TSI SER-ACNC: 3.89 MIU/ML (ref 0.55–4.78)

## 2024-08-30 PROCEDURE — 83880 ASSAY OF NATRIURETIC PEPTIDE: CPT

## 2024-08-30 PROCEDURE — G2211 COMPLEX E/M VISIT ADD ON: HCPCS | Performed by: FAMILY MEDICINE

## 2024-08-30 PROCEDURE — 84439 ASSAY OF FREE THYROXINE: CPT

## 2024-08-30 PROCEDURE — 99214 OFFICE O/P EST MOD 30 MIN: CPT | Performed by: FAMILY MEDICINE

## 2024-08-30 PROCEDURE — 36415 COLL VENOUS BLD VENIPUNCTURE: CPT

## 2024-08-30 PROCEDURE — 84443 ASSAY THYROID STIM HORMONE: CPT

## 2024-08-30 PROCEDURE — 80048 BASIC METABOLIC PNL TOTAL CA: CPT

## 2024-08-30 RX ORDER — FUROSEMIDE 20 MG
20 TABLET ORAL DAILY
COMMUNITY
End: 2024-08-30

## 2024-08-30 RX ORDER — METOLAZONE 2.5 MG/1
2.5 TABLET ORAL
Qty: 8 TABLET | Refills: 0 | Status: SHIPPED | OUTPATIENT
Start: 2024-08-30 | End: 2024-09-29

## 2024-08-30 RX ORDER — COLLAGENASE SANTYL 250 [ARB'U]/G
1 OINTMENT TOPICAL DAILY
Qty: 90 G | Refills: 0 | Status: SHIPPED | OUTPATIENT
Start: 2024-08-30 | End: 2024-10-29

## 2024-08-30 RX ORDER — ALGINATE DRESSING 2" X 2"
1 BANDAGE TOPICAL DAILY
Qty: 10 EACH | Refills: 5 | Status: SHIPPED | OUTPATIENT
Start: 2024-08-30 | End: 2024-10-29

## 2024-08-30 NOTE — PATIENT INSTRUCTIONS
APPLY SANTYL TO YOUR WOUND DAILY AND APPLY CALCIUM ALGINATE OVER THE WOUND. I GAVE A TWO MONTH SUPPLY OF THIS. YOU SHOULD CALL THE WOUND CARE CLINIC AND SCHEDULE AN APPOINTMENT TO BE SEEN IN THE NEXT 1-2 WEEKS.     FOR THE SWELLING, TAKE METOLAZONE 1 TABLET TWICE A WEEK FOR WEEK #1. IF SYMPTOMS ARE BETTER, HOLD THE MEDICATION. YOU CAN TAKE IT AGAIN IF NEEDED. I HAVE ONLY GIVEN 8 TABLETS - THIS IS NOT A MEDICATION I WANT YOU TO TAKE REGULARLY DUE TO THE RISK OF TAKING TWO DIURETICS. FOLLOW UP WITH CARDIOLOGY.     I ALSO PLACED A REFERRAL FOR DR. ATNUNEZ FOR TOENAIL CARE/DIABETIC FOOT EXAM.

## 2024-08-31 ENCOUNTER — PATIENT MESSAGE (OUTPATIENT)
Dept: FAMILY MEDICINE CLINIC | Facility: CLINIC | Age: 79
End: 2024-08-31

## 2024-08-31 ENCOUNTER — PATIENT MESSAGE (OUTPATIENT)
Facility: CLINIC | Age: 79
End: 2024-08-31

## 2024-09-03 ENCOUNTER — PATIENT MESSAGE (OUTPATIENT)
Facility: CLINIC | Age: 79
End: 2024-09-03

## 2024-09-03 DIAGNOSIS — I27.20 PULMONARY HTN (HCC): ICD-10-CM

## 2024-09-03 NOTE — TELEPHONE ENCOUNTER
RN phoned patient to discuss Mychart message    States he has seen PCP and cardiology regarding his swelling in the lower extremities.    He is looking for compression sock recommendations.    While on the phone patient wants to discuss blood sugars. States they have been higher. Did not have access to his BG log, but will send this in a [employer] message    Used to take Apidra and everything was fine, now on Novolog his numbers have been higher.    Patient to send log and current medications in a [employer] message.     Routed for review.

## 2024-09-03 NOTE — TELEPHONE ENCOUNTER
Received request from [facility] for new prescription for 90 days of Isosorbide Mononitrate ER 30 mg ER tabs.

## 2024-09-03 NOTE — TELEPHONE ENCOUNTER
From: Ida Burgess  To: Jessicafelicepadmini Shoshana  Sent: 8/31/2024 11:50 AM CDT  Subject: Dr Briggs +compression socks.     Salam. I did get an appt with Dr LOCKE on oct3.it was difficult to get an appt. I wish he could see me sooner.   I did see the cardiologist yesterday. He agreed with u on the Rx . He also recommended compression socks. Do I need a prescription for the right ones. Choices on [employer] r so confusing.

## 2024-09-03 NOTE — TELEPHONE ENCOUNTER
Requesting Isosorbide ER 30mg  Last OV: 8/30/24  RTC: 2 weeks  Last Rx'd 6/29/24 #90 with 0 refills    Future Appointments   Date Time Provider Department Center   9/6/2024  1:15 PM [facility] DIABETIC EDUCATOR ENDO EMGENDO [facility] [place]   10/3/2024  9:00 AM José Briggs MD [facility]   10/15/2024  3:00 PM Niurka Romano DO [id number] [facility] [place]   11/5/2024  3:00 PM Elder Mcknight MD ENINAPER2 [facility] [place]       Non-protocol med:  Rx pended and routed for approval/denial

## 2024-09-03 NOTE — TELEPHONE ENCOUNTER
From: Ida Burgess  To: Niurka Romano  Sent: 9/3/2024 11:27 AM CDT  Subject: Glucometer readings    Here are my readings:  09/03 193 @ 10.27am   09/02 223 am   09/01 183 am   08/31 177 am   08/30 168 am   08/29 191 am   08/28 191 am - 166 2.11pm   08/27 179 am   08/26 157 am- 199 2 hrs later  08/24 189 am -148 8.04 pm   08/23 185 am - 211 1.40 pm   08/22 185 am-108 2.14 pm   08/21 177 am  08/20 203 am   I have now been taking 54 units of Lantus each am and 54 units of Novolog tid   Benjy also taking Metformin and jardiance , the same doses.   I wonder if novolog is the culprit.

## 2024-09-03 NOTE — TELEPHONE ENCOUNTER
Medication Quantity Refills Start End   isosorbide mononitrate ER 30 MG Oral Tablet 24 Hr 90 tablet 0 6/29/2024 --   Sig:   Take 1 tablet (30 mg total) by mouth daily.     Route:   Oral     Order #:   [id number]       Last OV 8/30/24  Future Appointments   Date Time Provider Department Center   10/3/2024  9:00 AM José Briggs MD  [facility]   10/15/2024  3:00 PM Niurka Romano DO [id number] [facility] [place]   11/5/2024  3:00 PM Elder Mcknight MD ENINAPER2 [facility] [place]    Isosorbide Mononitrate     Dispensed Written Strength Quantity Refills Days Supply Provider Pharmacy   ISOSORB MONO E 30MG TAB SHLOMO 06/29/2024 06/29/2024 30 90 each  90 Ashley Anna DO [id number] [place]...

## 2024-09-04 RX ORDER — TAMSULOSIN HYDROCHLORIDE 0.4 MG/1
0.8 CAPSULE ORAL DAILY
Qty: 180 CAPSULE | Refills: 1 | Status: SHIPPED | OUTPATIENT
Start: 2024-09-04

## 2024-09-04 RX ORDER — ISOSORBIDE MONONITRATE 30 MG/1
30 TABLET, EXTENDED RELEASE ORAL DAILY
Qty: 90 TABLET | Refills: 0 | OUTPATIENT
Start: 2024-09-04

## 2024-09-04 NOTE — TELEPHONE ENCOUNTER
Name from pharmacy: TAMSULOSIN CAP 0.4MG          Will file in chart as: TAMSULOSIN 0.4 MG Oral Cap    Sig: TAKE 2 CAPSULES DAILY    Disp: 180 capsule    Refills: 1    Start: 9/3/2024    Class: Normal    Non-formulary    Last ordered: 5 months ago (3/16/2024) by José Briggs MD    Last refill: 6/14/2024    Rx #: [id number]    Genitourinary Medications [id number]/03/2024 07:03 PM   Protocol Details Patient does not have pulmonary hypertension on problem list    In person appointment or virtual visit in the past 12 mos or appointment in next 3 mos      [facility] at[facility] [place] [address] [facility], [phone], [phone]     Future Appointments   Date Time Provider Department Center   9/6/2024  1:15 PM [facility] DIABETIC EDUCATOR ENDO EM[facility]O [facility] [place]   10/3/2024  9:00 AM José Briggs MD [facility]   10/15/2024  3:00 PM Niurka Romano DO [id number] [facility] [place]   11/5/2024  3:00 PM Elder Mcknight MD ENINAPER2 [facility] [place]     LOV: 8/30/24  Last r/f: 3/16/24 # 180 1 r/fs  Labs: bmp: 8/30/24     RTC: 2 wks if symptoms worsen or dont improve    Please advise

## 2024-09-04 NOTE — TELEPHONE ENCOUNTER
Hello, is there any update regarding his glucose levels?  I know he has an upcoming appointment on 9/6/2024 with Madhavi.

## 2024-09-05 ENCOUNTER — PATIENT MESSAGE (OUTPATIENT)
Facility: CLINIC | Age: 79
End: 2024-09-05

## 2024-09-05 ENCOUNTER — TELEPHONE (OUTPATIENT)
Facility: CLINIC | Age: 79
End: 2024-09-05

## 2024-09-05 RX ORDER — LEVOTHYROXINE SODIUM 75 UG/1
75 TABLET ORAL
Qty: 90 TABLET | Refills: 0 | Status: SHIPPED | OUTPATIENT
Start: 2024-09-05

## 2024-09-05 NOTE — TELEPHONE ENCOUNTER
From: Ida Burgess  To: Niurka Romano  Sent: 9/5/2024 1:59 PM CDT  Subject: Travel.     Hi. I will b traveling to [facility] on Sep 20 2024. I am requesting you to send me a letter indicating I will be carrying insulin syringes, testing supplies and oral Rx for diabetes. Pls mail it to:   [id number] [place] [address]   Thanks

## 2024-09-05 NOTE — TELEPHONE ENCOUNTER
LOV: 7/11/24     Next office visit: 10/15/24     Refill request: Levothyroxine 75 mcg tab     Order pended and routed to provider

## 2024-09-06 ENCOUNTER — TELEPHONE (OUTPATIENT)
Facility: CLINIC | Age: 79
End: 2024-09-06

## 2024-09-06 ENCOUNTER — PATIENT MESSAGE (OUTPATIENT)
Facility: CLINIC | Age: 79
End: 2024-09-06

## 2024-09-06 ENCOUNTER — NURSE ONLY (OUTPATIENT)
Facility: CLINIC | Age: 79
End: 2024-09-06
Payer: MEDICARE

## 2024-09-06 DIAGNOSIS — E11.9 TYPE 2 DIABETES MELLITUS WITHOUT COMPLICATION, WITH LONG-TERM CURRENT USE OF INSULIN (HCC): Primary | ICD-10-CM

## 2024-09-06 DIAGNOSIS — Z79.4 TYPE 2 DIABETES MELLITUS WITHOUT COMPLICATION, WITH LONG-TERM CURRENT USE OF INSULIN (HCC): Primary | ICD-10-CM

## 2024-09-06 PROCEDURE — 99211 OFF/OP EST MAY X REQ PHY/QHP: CPT | Performed by: STUDENT IN AN ORGANIZED HEALTH CARE EDUCATION/TRAINING PROGRAM

## 2024-09-06 RX ORDER — INSULIN GLULISINE 100 [IU]/ML
INJECTION, SOLUTION SUBCUTANEOUS
Qty: 45 ML | Refills: 1 | Status: SHIPPED | OUTPATIENT
Start: 2024-09-06

## 2024-09-06 NOTE — PATIENT INSTRUCTIONS
Instructions for Lantus switch:    - Take 27 units tomorrow (Saturday) morning    - Take the other 27 units tomorrow (Saturday) evening at your new dosing time    - On Sunday take the full 54 units in the evening

## 2024-09-06 NOTE — TELEPHONE ENCOUNTER
[facility] today[facility]    Closing Encounter.

## 2024-09-06 NOTE — TELEPHONE ENCOUNTER
RN phoned pharmacy- states this is $105 co-pay for 45 ml    Pharmacy states they spoke with patient and told him they would order this in and it would be in Monday or Tuesday.    [employer] message sent to patient.

## 2024-09-06 NOTE — PROGRESS NOTES
Glucose Meter Review     Ida Burgess  6/13/1945     Referred by: Niurka Romano DO     Medical Background      Past Medical History:    Atherosclerosis of coronary artery    Heart disease    Hyperthyroidism    Other and unspecified hyperlipidemia    Type II or unspecified type diabetes mellitus without mention of complication, not stated as uncontrolled    Unspecified essential hypertension    Vertigo      Lab Results   Component Value Date    A1C 7.4 (A) 07/11/2024    A1C 7.5 (A) 04/04/2024    A1C 7.6 (A) 01/02/2024    A1C 7.8 (A) 10/03/2023    A1C 7.1 12/02/2022         Patient has T2DM, seen today regarding high blood glucose levels     Medication Review      DM Meds: Jardiance Tabs - 25 MG; Lantus SoloStar Sopn - 100 UNIT/ML; metFORMIN Tabs - 500 MG; NovoLOG FlexPen Sopn - 100 UNIT/MLMeds : Kept the same       Patient States Currently Taking:   - Lantus 54 u AM  - Novolog 54 u 3 x daily   - Metformin 2000 mg daily  - Jardiance 25 mg     Blood Glucose Review      Reviewed Meter Data for last 14 days:     09/03 193 @ 10.27am   09/02 223 am   09/01 183 am   08/31 177 am   08/30 168 am   08/29 191 am   08/28 191 am - 166 2.11pm   08/27 179 am   08/26 157 am- 199 2 hrs later  08/24 189 am -148 8.04 pm   08/23 185 am - 211 1.40 pm   08/22 185 am-108 2.14 pm   08/21 177 am  08/20 203 am     Data Interpretation:   FBG not to goal, may benefit from switching timing of long acting insulin    Patient Concerns   - blood glucose levels have been much higher since switching to Novolog, would like to try and appeal plan for apidra     Recommendations / Plan      - Take Lantus 54 u every evening at 9 pm     Future Appointments   Date Time Provider Department Center   9/6/2024  1:15 PM EMG DIABETIC EDUCATOR ENDO EMGENDO [facility]   10/3/2024  9:00 AM José Briggs MD [facility]   10/15/2024  3:00 PM Niurka Romano DO [id number] [facility]   11/5/2024  3:00 PM Elder Mcknight MD [id number] [facility]        9/6/2024  Madhavi Mora RN, MSN, BC-ADM, [facility]  Diabetes Care &      A total of 15 minutes was spent with the patient including chart review, discussion and education / advisement pertinent to patient and provider specified concerns as documented above.     Note to patient: The 21 Century Cures Act makes medical notes like these available to patients in the interest of transparency. However, be advised this is a medical document. It is intended as peer to peer communication. It is written in medical language and may contain abbreviations or verbiage that are unfamiliar. It may appear blunt or direct. Medical documents are intended to carry relevant information, facts as evident, and the clinical opinion of the practitioner.

## 2024-09-06 NOTE — TELEPHONE ENCOUNTER
Received phone call from Olman RODAS With [facility]- needing clarification on Apidra rx.      Three times daily with meals?    Also need to know specific scale.    Spoke with Diabetic Educator, kathleen Hendrickson to remove scale from order- just 22u with meals.    Verbal given.    Closing this encounter.

## 2024-09-09 DIAGNOSIS — I27.20 PULMONARY HTN (HCC): ICD-10-CM

## 2024-09-12 ENCOUNTER — OFFICE VISIT (OUTPATIENT)
Dept: FAMILY MEDICINE CLINIC | Facility: CLINIC | Age: 79
End: 2024-09-12
Payer: MEDICARE

## 2024-09-12 VITALS
TEMPERATURE: 98 F | HEART RATE: 60 BPM | OXYGEN SATURATION: 96 % | DIASTOLIC BLOOD PRESSURE: 68 MMHG | RESPIRATION RATE: 16 BRPM | SYSTOLIC BLOOD PRESSURE: 128 MMHG | WEIGHT: 202 LBS | HEIGHT: 66 IN | BODY MASS INDEX: 32.47 KG/M2

## 2024-09-12 DIAGNOSIS — T16.2XXA FOREIGN BODY OF LEFT EAR, INITIAL ENCOUNTER: Primary | ICD-10-CM

## 2024-09-12 PROCEDURE — 99212 OFFICE O/P EST SF 10 MIN: CPT | Performed by: NURSE PRACTITIONER

## 2024-09-12 NOTE — PROGRESS NOTES
Subjective:   Patient ID: Ida Burgess is a 79 year old male.    Patient is an 79 year old male who presents today for removal of foreign body from left ear canal. Noticed the tip of his hearing aid was missing this afternoon. Denies ear pain.        History/Other:   Review of Systems   HENT:  Negative for ear pain.         + foreign body left ear     Current Outpatient Medications   Medication Sig Dispense Refill    Insulin Glulisine (APIDRA SOLOSTAR) 100 UNIT/ML Subcutaneous Solution Pen-injector Take 22 u with meals plus scale 45 mL 1    levothyroxine 75 MCG Oral Tab Take 1 tablet (75 mcg total) by mouth before breakfast. 90 tablet 0    TAMSULOSIN 0.4 MG Oral Cap TAKE 2 CAPSULES DAILY 180 capsule 1    metOLazone 2.5 MG Oral Tab Take 1 tablet (2.5 mg total) by mouth twice a week. 8 tablet 0    collagenase (SANTYL) 250 UNIT/GM External Ointment Apply 1 Application topically daily. 90 g 0    Calcium Alginate (ALGICELL CALCIUM DRESSING 2\"X2) External Misc Apply 1 Application topically daily. 10 each 5    metFORMIN 500 MG Oral Tab Take 2 tablets (1,000 mg total) by mouth 2 (two) times daily with meals. 360 tablet 1    IRBESARTAN 150 MG Oral Tab TAKE 1 TABLET DAILY 90 tablet 0    Glucose Blood (ACCU-CHEK GUIDE) In Vitro Strip Test 4-5 times per day.  Diagnoses: E11.649 and Z79.4 400 strip 0    isosorbide mononitrate ER 30 MG Oral Tablet 24 Hr Take 1 tablet (30 mg total) by mouth daily. 90 tablet 0    Potassium Chloride ER 10 MEQ Oral Tab CR Take 1 tablet (10 mEq total) by mouth 2 (two) times daily. 180 tablet 0    pregabalin (LYRICA) 75 MG Oral Cap Take 1 capsule (75 mg total) by mouth 2 (two) times daily. 180 capsule 0    insulin aspart (NOVOLOG FLEXPEN) 100 Units/mL Subcutaneous Solution Pen-injector Take 16U with meals and ISF 1:20 >120 48 mL 2    insulin glargine (LANTUS SOLOSTAR) 100 UNIT/ML Subcutaneous Solution Pen-injector Inject 65 Units into the skin nightly. 60 mL 1    atorvastatin 40 MG Oral Tab Take 1  tablet (40 mg total) by mouth nightly. 90 tablet 1    Niacin ER, Antihyperlipidemic, 500 MG Oral Tab CR Take 1 tablet (500 mg total) by mouth nightly. 90 tablet 1    furosemide 20 MG Oral Tab Take 1 tablet (20 mg total) by mouth daily. 90 tablet 1    Glucose Blood (ACCU-CHEK GUIDE) In Vitro Strip Test glucose two times a day 100 strip 3    Blood Glucose Monitoring Suppl (ACCU-CHEK GUIDE) w/Device Does not apply Kit 1 Device  in the morning and 1 Device before bedtime. 1 kit 0    Accu-Chek FastClix Lancets Does not apply Misc 1 Lancet  in the morning and 1 Lancet before bedtime. Test glucose twice daily. 100 each 5    Empagliflozin (JARDIANCE) 25 MG Oral Tab Take 1 tablet by mouth daily. 90 tablet 0    atenolol 25 MG Oral Tab Take 1 tablet (25 mg total) by mouth daily. 90 tablet 0    aspirin 81 MG Oral Tab EC Take 1 tablet (81 mg total) by mouth daily.      Blood Glucose Monitoring Suppl (ACCU-CHEK GUIDE) w/Device Does not apply Kit 1 kit by Does not apply route.      Insulin Pen Needle 31G X 8 MM Does not apply Misc 4/day      Lancets Misc. (ACCU-CHEK FASTCLIX LANCET) Does not apply Kit       Ferrous Sulfate 325 (65 FE) MG Oral Tab Take 1 tablet (325 mg total) by mouth daily with breakfast.       Allergies:  Allergies   Allergen Reactions    Bromocriptine ANAPHYLAXIS and OTHER (SEE COMMENTS)    Clindamycin HIVES    Other OTHER (SEE COMMENTS)     Cycloset     /68   Pulse 60   Temp 97.5 °F (36.4 °C)   Resp 16   Ht 5' 6\" (1.676 m)   Wt 202 lb (91.6 kg)   SpO2 96%   BMI 32.60 kg/m²     Objective:   Physical Exam  Vitals reviewed.   HENT:      Left Ear: A foreign body is present.      Ears:      Comments: Hearing aid tip noted to left ear canal  Skin:     General: Skin is warm and dry.   Neurological:      Mental Status: He is alert and oriented to person, place, and time.         Assessment & Plan:   1. Foreign body of left ear, initial encounter        No orders of the defined types were placed in this  encounter.      Meds This Visit:  Requested Prescriptions      No prescriptions requested or ordered in this encounter     Tip of hearing aid removed with alligator forceps. Post removal exam reveals normal canal and TM. Patient notes immediate relief.

## 2024-09-17 ENCOUNTER — TELEPHONE (OUTPATIENT)
Facility: CLINIC | Age: 79
End: 2024-09-17

## 2024-09-17 ENCOUNTER — PATIENT MESSAGE (OUTPATIENT)
Facility: CLINIC | Age: 79
End: 2024-09-17

## 2024-09-17 RX ORDER — ISOSORBIDE MONONITRATE 30 MG/1
30 TABLET, EXTENDED RELEASE ORAL DAILY
Qty: 90 TABLET | Refills: 2 | Status: SHIPPED | OUTPATIENT
Start: 2024-09-17

## 2024-09-17 NOTE — TELEPHONE ENCOUNTER
Requesting     Disp Refills Start End     isosorbide mononitrate ER 30 MG Oral Tablet 24 Hr 90 tablet 0 6/29/2024 --    Sig - Route: Take 1 tablet (30 mg total) by mouth daily. - Oral    Sent to pharmacy as: Isosorbide Mononitrate ER 30 MG Oral Tablet Extended Release 24 Hour (Imdur)      LOV: 8/30/2024 w/Dr. Anna for swelling and wound care  RTC: Return in about 2 weeks (around 9/13/2024), or if symptoms worsen or fail to improve.     Filled:     Dispensed Written Strength Quantity Refills Days Supply Provider Pharmacy    ISOSORB MONO E 30MG TAB SHLOMO 06/29/2024 06/29/2024 30 90 each  90 Ashley Anna DO [id number] [place]       Future Appointments   Date Time Provider Department Center   10/3/2024  9:00 AM José Briggs MD  [facility]   10/15/2024  3:00 PM Niurka Romano DO [id number] [facility]   11/5/2024  3:00 PM Elder Mcknight MD ENINAPER2 [facility]     Rx pended  No protocol  Routed to provider for review

## 2024-09-17 NOTE — TELEPHONE ENCOUNTER
Patient called into clinic stating that he sent in blood glucose readings for review by CDE via [employer] last Friday 9/13. States that he will be leaving out of town on Friday 9/20 and would like to speak with CDE regarding his blood glucose readings prior to this.     Routing for review.

## 2024-09-26 ENCOUNTER — PATIENT MESSAGE (OUTPATIENT)
Facility: CLINIC | Age: 79
End: 2024-09-26

## 2024-10-01 ENCOUNTER — PATIENT MESSAGE (OUTPATIENT)
Dept: FAMILY MEDICINE CLINIC | Facility: CLINIC | Age: 79
End: 2024-10-01

## 2024-10-03 ENCOUNTER — OFFICE VISIT (OUTPATIENT)
Dept: WOUND CARE | Facility: HOSPITAL | Age: 79
End: 2024-10-03
Attending: FAMILY MEDICINE
Payer: MEDICARE

## 2024-10-03 VITALS
SYSTOLIC BLOOD PRESSURE: 147 MMHG | HEIGHT: 66 IN | BODY MASS INDEX: 32.14 KG/M2 | DIASTOLIC BLOOD PRESSURE: 63 MMHG | TEMPERATURE: 98 F | WEIGHT: 200 LBS | HEART RATE: 57 BPM | RESPIRATION RATE: 16 BRPM

## 2024-10-03 DIAGNOSIS — E11.42 POLYNEUROPATHY DUE TO TYPE 2 DIABETES MELLITUS (HCC): ICD-10-CM

## 2024-10-03 DIAGNOSIS — Z79.4 TYPE 2 DIABETES MELLITUS WITHOUT COMPLICATION, WITH LONG-TERM CURRENT USE OF INSULIN (HCC): ICD-10-CM

## 2024-10-03 DIAGNOSIS — E11.9 TYPE 2 DIABETES MELLITUS WITHOUT COMPLICATION, WITH LONG-TERM CURRENT USE OF INSULIN (HCC): ICD-10-CM

## 2024-10-03 DIAGNOSIS — L89.150 PRESSURE ULCER OF SACRAL REGION, UNSTAGEABLE (HCC): Primary | ICD-10-CM

## 2024-10-03 DIAGNOSIS — I10 ESSENTIAL HYPERTENSION: ICD-10-CM

## 2024-10-03 LAB — GLUCOSE BLD-MCNC: 127 MG/DL (ref 70–99)

## 2024-10-03 PROCEDURE — 99215 OFFICE O/P EST HI 40 MIN: CPT | Performed by: FAMILY MEDICINE

## 2024-10-03 RX ORDER — NIACIN 500 MG/1
500 TABLET, EXTENDED RELEASE ORAL NIGHTLY
Qty: 90 TABLET | Refills: 0 | Status: SHIPPED | OUTPATIENT
Start: 2024-10-03

## 2024-10-03 NOTE — PROGRESS NOTES
Weekly Wound Education Note    Teaching Provided To: Patient  Training Topics: Discharge instructions;Cleasing and general instructions;Dressing;Off-loading  Training Method: Demonstration;Explain/Verbal  Training Response: Reinforcement needed        Notes: Patient is here for an initial consult to sacral area. Patient states he has had wound since August, rates his pain currently at  6/10. Provider stimulated wound bed at visit today. Provider recommending patient to use coloplast triad paste to wound bed covered with 4x4 bordered foam. Educated patient on importance of off loading cushion, provided print out information about purpose and use of EHOB off loading cushion. Educated patient to use cushion on all surfaces at all times. Pt to follow up with provider in 1 week.

## 2024-10-03 NOTE — PROGRESS NOTES
Chief Complaint   Patient presents with    Wound Care     Patient is here for an initial consult. He presents with a sacral wound that he has had since August. His pain is currently 6/10.        HPI:     79-year-old new patient here for evaluation of wound in the sacral region.  He noticed a soreness and some pain in that sacral region when he was on a recent trip to [place] for [employer] within the last month.  He has not had any prolonged bedrest but thinks that the wound might be related to pressure as he had to sit on different types of surfaces for long periods of time.  He has never had pressure ulcer before.  Past medical history significant for type 2 diabetes under control, high blood pressure, high cholesterol, CAD, BMI elevated at 32.28.    Lab Results   Component Value Date    BUN 18 08/30/2024    CREATSERUM 0.86 08/30/2024    ALB 3.5 10/04/2023    TP 7.3 10/04/2023    A1C 7.4 (A) 07/11/2024         Current Outpatient Medications:     isosorbide mononitrate ER 30 MG Oral Tablet 24 Hr, Take 1 tablet (30 mg total) by mouth daily., Disp: 90 tablet, Rfl: 2    Insulin Glulisine (APIDRA SOLOSTAR) 100 UNIT/ML Subcutaneous Solution Pen-injector, Take 22 u with meals plus scale, Disp: 45 mL, Rfl: 1    levothyroxine 75 MCG Oral Tab, Take 1 tablet (75 mcg total) by mouth before breakfast., Disp: 90 tablet, Rfl: 0    TAMSULOSIN 0.4 MG Oral Cap, TAKE 2 CAPSULES DAILY, Disp: 180 capsule, Rfl: 1    collagenase (SANTYL) 250 UNIT/GM External Ointment, Apply 1 Application topically daily., Disp: 90 g, Rfl: 0    metFORMIN 500 MG Oral Tab, Take 2 tablets (1,000 mg total) by mouth 2 (two) times daily with meals., Disp: 360 tablet, Rfl: 1    IRBESARTAN 150 MG Oral Tab, TAKE 1 TABLET DAILY, Disp: 90 tablet, Rfl: 0    Glucose Blood (ACCU-CHEK GUIDE) In Vitro Strip, Test 4-5 times per day.  Diagnoses: E11.649 and Z79.4, Disp: 400 strip, Rfl: 0    Potassium Chloride ER 10 MEQ Oral Tab CR, Take 1 tablet (10 mEq total) by mouth 2  (two) times daily., Disp: 180 tablet, Rfl: 0    pregabalin (LYRICA) 75 MG Oral Cap, Take 1 capsule (75 mg total) by mouth 2 (two) times daily., Disp: 180 capsule, Rfl: 0    insulin glargine (LANTUS SOLOSTAR) 100 UNIT/ML Subcutaneous Solution Pen-injector, Inject 65 Units into the skin nightly., Disp: 60 mL, Rfl: 1    atorvastatin 40 MG Oral Tab, Take 1 tablet (40 mg total) by mouth nightly., Disp: 90 tablet, Rfl: 1    furosemide 20 MG Oral Tab, Take 1 tablet (20 mg total) by mouth daily., Disp: 90 tablet, Rfl: 1    Glucose Blood (ACCU-CHEK GUIDE) In Vitro Strip, Test glucose two times a day, Disp: 100 strip, Rfl: 3    Blood Glucose Monitoring Suppl (ACCU-CHEK GUIDE) w/Device Does not apply Kit, 1 Device  in the morning and 1 Device before bedtime., Disp: 1 kit, Rfl: 0    Accu-Chek FastClix Lancets Does not apply Misc, 1 Lancet  in the morning and 1 Lancet before bedtime. Test glucose twice daily., Disp: 100 each, Rfl: 5    Empagliflozin (JARDIANCE) 25 MG Oral Tab, Take 1 tablet by mouth daily., Disp: 90 tablet, Rfl: 0    atenolol 25 MG Oral Tab, Take 1 tablet (25 mg total) by mouth daily., Disp: 90 tablet, Rfl: 0    aspirin 81 MG Oral Tab EC, Take 1 tablet (81 mg total) by mouth daily., Disp: , Rfl:     Blood Glucose Monitoring Suppl (ACCU-CHEK GUIDE) w/Device Does not apply Kit, 1 kit by Does not apply route., Disp: , Rfl:     Insulin Pen Needle 31G X 8 MM Does not apply Misc, 4/day, Disp: , Rfl:     Lancets Misc. (ACCU-CHEK FASTCLIX LANCET) Does not apply Kit, , Disp: , Rfl:     Ferrous Sulfate 325 (65 FE) MG Oral Tab, Take 1 tablet (325 mg total) by mouth daily with breakfast., Disp: , Rfl:     Niacin ER, Antihyperlipidemic, 500 MG Oral Tab CR, TAKE 1 TABLET NIGHTLY, Disp: 90 tablet, Rfl: 0    Calcium Alginate (ALGICELL CALCIUM DRESSING 2\"X2) External Misc, Apply 1 Application topically daily. (Patient not taking: Reported on 10/3/2024), Disp: 10 each, Rfl: 5    insulin aspart (NOVOLOG FLEXPEN) 100 Units/mL  Subcutaneous Solution Pen-injector, Take 16U with meals and ISF 1:20 >120 (Patient not taking: Reported on 10/3/2024), Disp: 48 mL, Rfl: 2    Allergies   Allergen Reactions    Bromocriptine ANAPHYLAXIS and OTHER (SEE COMMENTS)    Clindamycin HIVES    Other OTHER (SEE COMMENTS)     Cycloset            REVIEW OF SYSTEMS:     Review of Systems (ROS)  This information was obtained from the patient, chart    A comprehensive 10 point review of systems was completed.  Pertinent positives and negatives noted in the the HPI.     Past medical, surgical, family and social history updated where appropriate.    PHYSICAL EXAM:   /63   Pulse 57   Temp 98 °F (36.7 °C)   Resp 16   Ht 66\"   Wt 200 lb (90.7 kg)   BMI 32.28 kg/m²    Estimated body mass index is 32.28 kg/m² as calculated from the following:    Height as of this encounter: 66\".    Weight as of this encounter: 200 lb (90.7 kg).   Vital signs reviewed.Appears stated age, well groomed.        Calf                      Ankle                            Foot                               Wound 10/03/24 #1 Sacrum Sacrum (Active)   Date First Assessed/Time First Assessed: 10/03/24 0907    Wound Number (Wound Clinic Only): #1 Sacrum  Primary Wound Type: Pressure Injury  Location: Sacrum      Assessments 10/3/2024  9:10 AM   Wound Image     Drainage Amount Unable to assess   Wound Length (cm) 1.4 cm   Wound Width (cm) 1.2 cm   Wound Surface Area (cm^2) 1.68 cm^2   Wound Depth (cm) 0.1 cm   Wound Volume (cm^3) 0.168 cm^3   Margins Well-defined edges   Non-staged Wound Description Full thickness   Larisa-wound Assessment Dry;Induration   Wound Granulation Tissue Firm;Pink   Wound Bed Granulation (%) 30 %   Wound Bed Epithelium (%) 60 %   Wound Bed Slough (%) 10 %   Wound Odor None   Tunneling? No   Undermining? No   Sinus Tracts? No   Pressure Injury Stage Stage 3       No associated orders.              ASSESSMENT AND PLAN:      1. Pressure ulcer of sacral region,  unstageable (HCC)    2. 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 Type 2 diabetes mellitus without complication, with long-term current use of insulin (HCC)    4. Essential hypertension    Clinically the wound has slough in the wound base is appearing to be superficial but because of the slough is uncertain of the depth.  Will apply Coloplast Triad paste and border foam dressing and patient to change daily.  Offloading measures are recommended including Ehob cushion and frequent turning measures.    Risks, benefits, and alternatives of current treatment plan discussed in detail.  Questions and concerns addressed. Red flags to RTC or ED reviewed.  Patient (or parent) agrees to plan.      Return in about 1 week (around 10/10/2024).    Also refer to patient instructions.     I spent a total of 40 minutes with this patient's care.  This time included preparing to see the patient (eg, review notes and recent diagnostics), seeing the patient, performing a medically appropriate examination and/or evaluation, counseling and educating the patient, and documenting in the record.          José Briggs M.D.   [facility]   10/3/2024    Patient Instructions       PATIENT INSTRUCTIONS      FOR Ida Burgess ,  1945    DATE OF SERVICE: 10/3/2024      Swipe with a gloved finger Coloplast Triad paste to the wound once a day    Cover with border foam dressing    OFFLOADING IS CRITICAL TO WOUND HEALING    USE THE EHOB CUSHION FOR ALL SITTING SURFACES     TRY TO LAY ON YOUR SIDE IN BED     FREQUENT TURNING SIDE TO SIDE EVERY 2 HOURS        Follow up with Dr. Briggs 1 WEEK      MISCELLANEOUS INSTRUCTIONS  Supplement with a daily multivitamin   Low salt diet  Intense blood sugar control - Goal Blood sugar below 180 at all times recommended.  Increase protein intake / consider protein supplements - see below  Elevate extremities at all times when sitting / laying down.  No tobacco use      DIETARY MODIFICATIONS TO HELP WITH WOUND HEALING:          Please follow any restrictions to diet given by your other doctors     Protein: meats, beans, eggs, milk and yogurt particularly Greek yogurt), tofu, soy nuts, soy protein products     Vitamin C: citrus fruits and juices, strawberries, tomatoes, tomato juice, peppers, baked potatoes, spinach, broccoli, cauliflower, [place] sprouts, cabbage     Vitamin A: dark greens, leafy vegetables, orange or yellow vegetables, cantaloupe, fortified dairy products, liver, fortified cereals     Zinc: fortified cereals, red meats, seafood     Consider Mina by [employer] (These are essential branch chain amino acids that help with tissue building and wound healing) and take 2 packets/day. You can order online at [employer] or [employer]     ADDITIONAL REMINDERS:     The treatment plan has been discussed at length with you and your provider. Follow all instructions carefully, it is very important. If you do not follow all instructions, you are at risk of your wound not healing, infection, possible loss of limb and even loss of life.  Please call the clinic at [phone] during regular business hours ( 7:30 AM - 5:30 PM) if you notice increased redness, warmth, pain or pus like drainage or start running a fever greater than 100.3.    For after hour emergencies, please call your primary physician or go to the nearest emergency room.  If your blood pressure is elevated, follow up with your primary care doctor and/or cardiologist as soon as possible.                       MISCELLANEOUS INSTRUCTIONS  Supplement with a daily multivitamin   Low salt diet  Intense blood sugar control - Goal Blood sugar below 180 at all times recommended.  Increase protein intake / consider protein supplements - see below  Elevate extremities at all times when sitting / laying down.  No tobacco use     DIETARY MODIFICATIONS TO HELP WITH WOUND HEALING:          Please follow any restrictions to diet  given by your other doctors     Protein: meats, beans, eggs, milk and yogurt particularly Greek yogurt), tofu, soy nuts, soy protein products     Vitamin C: citrus fruits and juices, strawberries, tomatoes, tomato juice, peppers, baked potatoes, spinach, broccoli, cauliflower, [employer] sprouts, cabbage     Vitamin A: dark greens, leafy vegetables, orange or yellow vegetables, cantaloupe, fortified dairy products, liver, fortified cereals     Zinc: fortified cereals, red meats, seafood     Consider Mina by [employer] (These are essential branch chain amino acids that help with tissue building and wound healing) and take 2 packets/day. You can order online at [employer] or [employer]     ADDITIONAL REMINDERS:     The treatment plan has been discussed at length with you and your provider. Follow all instructions carefully, it is very important. If you do not follow all instructions, you are at risk of your wound not healing, infection, possible loss of limb and even loss of life.  Please call the clinic at [phone] during regular business hours ( 7:30 AM - 5:30 PM) if you notice increased redness, warmth, pain or pus like drainage or start running a fever greater than 100.3.    For after hour emergencies, please call your primary physician or go to the nearest emergency room.  If your blood pressure is elevated, follow up with your primary care doctor and/or cardiologist as soon as possible.

## 2024-10-03 NOTE — PATIENT INSTRUCTIONS
PATIENT INSTRUCTIONS      FOR Ida Burgess ,  1945    DATE OF SERVICE: 10/3/2024      Swipe with a gloved finger Coloplast Triad paste to the wound once a day    Cover with border foam dressing    OFFLOADING IS CRITICAL TO WOUND HEALING    USE THE EHOB CUSHION FOR ALL SITTING SURFACES     TRY TO LAY ON YOUR SIDE IN BED     FREQUENT TURNING SIDE TO SIDE EVERY 2 HOURS        Follow up with Dr. Briggs 1 WEEK      MISCELLANEOUS INSTRUCTIONS  Supplement with a daily multivitamin   Low salt diet  Intense blood sugar control - Goal Blood sugar below 180 at all times recommended.  Increase protein intake / consider protein supplements - see below  Elevate extremities at all times when sitting / laying down.  No tobacco use     DIETARY MODIFICATIONS TO HELP WITH WOUND HEALING:          Please follow any restrictions to diet given by your other doctors     Protein: meats, beans, eggs, milk and yogurt particularly Greek yogurt), tofu, soy nuts, soy protein products     Vitamin C: citrus fruits and juices, strawberries, tomatoes, tomato juice, peppers, baked potatoes, spinach, broccoli, cauliflower, [place] sprouts, cabbage     Vitamin A: dark greens, leafy vegetables, orange or yellow vegetables, cantaloupe, fortified dairy products, liver, fortified cereals     Zinc: fortified cereals, red meats, seafood     Consider Mina by [employer] (These are essential branch chain amino acids that help with tissue building and wound healing) and take 2 packets/day. You can order online at [employer] or [employer]     ADDITIONAL REMINDERS:     The treatment plan has been discussed at length with you and your provider. Follow all instructions carefully, it is very important. If you do not follow all instructions, you are at risk of your wound not healing, infection, possible loss of limb and even loss of life.  Please call the clinic at [phone] during regular business hours ( 7:30 AM - 5:30 PM) if you notice increased redness,  warmth, pain or pus like drainage or start running a fever greater than 100.3.    For after hour emergencies, please call your primary physician or go to the nearest emergency room.  If your blood pressure is elevated, follow up with your primary care doctor and/or cardiologist as soon as possible.

## 2024-10-05 ENCOUNTER — TELEPHONE (OUTPATIENT)
Dept: FAMILY MEDICINE CLINIC | Facility: CLINIC | Age: 79
End: 2024-10-05

## 2024-10-05 NOTE — TELEPHONE ENCOUNTER
Received paperwork from [facility] for a new prescription for Potassium Chloride ER 10MEQ ER tabs.  Paperwork placed in MA's folder.

## 2024-10-07 DIAGNOSIS — I27.20 PULMONARY HTN (HCC): ICD-10-CM

## 2024-10-07 RX ORDER — IRBESARTAN 150 MG/1
150 TABLET ORAL DAILY
Qty: 90 TABLET | Refills: 0 | Status: SHIPPED | OUTPATIENT
Start: 2024-10-07

## 2024-10-07 NOTE — TELEPHONE ENCOUNTER
Requesting Irbesartan 150mg  LOV: 8/30/24  RTC: not noted  Last Relevant Labs: 8/30/24  Filled: 7/25/24 #90 with 0 refills    Future Appointments   Date Time Provider Department Center   10/10/2024 11:00 AM José Briggs MD [facility]   10/15/2024  3:00 PM Niurka Romano DO [id number] [facility]   11/5/2024  3:00 PM Elder Mcknight MD ENINAPER2 [facility]     Hypertension Medications Protocol Silckx29/05/2024 03:46 PM   Protocol Details Last BP reading less than 140/90    CMP or BMP in past 12 months    In person appointment or virtual visit in the past 12 mos or appointment in next 3 mos    EGFRCR or GFRNAA > 50     Rx sent to pharmacy per protocol

## 2024-10-07 NOTE — TELEPHONE ENCOUNTER
From: Ida Burgess  To: Ashley Anna  Sent: 10/1/2024 2:49 PM CDT  Subject: Malaria pills.     I have been taking malaria pills without any problems as of today. This afternoon I started diarrhea . Can I stop taking them

## 2024-10-09 RX ORDER — POTASSIUM CHLORIDE 750 MG/1
10 TABLET, EXTENDED RELEASE ORAL 2 TIMES DAILY
Qty: 180 TABLET | Refills: 0 | Status: SHIPPED | OUTPATIENT
Start: 2024-10-09

## 2024-10-09 NOTE — TELEPHONE ENCOUNTER
Requesting Potassium Chloride 10meq  Last OV: 8/30/24  RTC: not noted  Last Rx'd 6/29/24 #180 with 0 refills    Future Appointments   Date Time Provider Department Center   10/10/2024 11:00 AM José Briggs MD [facility]   10/15/2024  3:00 PM Niurka Romano DO [id number] [facility]   11/5/2024  3:00 PM Elder Mcknight MD [id number] [facility]       Non-protocol med:  Rx pended and routed for approval/denial

## 2024-10-10 ENCOUNTER — OFFICE VISIT (OUTPATIENT)
Dept: WOUND CARE | Facility: HOSPITAL | Age: 79
End: 2024-10-10
Attending: FAMILY MEDICINE
Payer: MEDICARE

## 2024-10-10 VITALS
DIASTOLIC BLOOD PRESSURE: 65 MMHG | TEMPERATURE: 98 F | SYSTOLIC BLOOD PRESSURE: 149 MMHG | RESPIRATION RATE: 16 BRPM | HEART RATE: 57 BPM

## 2024-10-10 DIAGNOSIS — I10 ESSENTIAL HYPERTENSION: ICD-10-CM

## 2024-10-10 DIAGNOSIS — Z79.4 TYPE 2 DIABETES MELLITUS WITHOUT COMPLICATION, WITH LONG-TERM CURRENT USE OF INSULIN (HCC): ICD-10-CM

## 2024-10-10 DIAGNOSIS — E11.9 TYPE 2 DIABETES MELLITUS WITHOUT COMPLICATION, WITH LONG-TERM CURRENT USE OF INSULIN (HCC): ICD-10-CM

## 2024-10-10 DIAGNOSIS — L89.150 PRESSURE ULCER OF SACRAL REGION, UNSTAGEABLE (HCC): Primary | ICD-10-CM

## 2024-10-10 DIAGNOSIS — E11.42 POLYNEUROPATHY DUE TO TYPE 2 DIABETES MELLITUS (HCC): ICD-10-CM

## 2024-10-10 LAB — GLUCOSE BLD-MCNC: 148 MG/DL (ref 70–99)

## 2024-10-10 PROCEDURE — 99213 OFFICE O/P EST LOW 20 MIN: CPT | Performed by: FAMILY MEDICINE

## 2024-10-10 NOTE — PROGRESS NOTES
Chief Complaint   Patient presents with    Wound Care     Patient is here for a wound care follow up. He denies any pain or new wound concerns.       HPI:     Patient here for follow-up of low back/sacral wound.  He is doing well and has no pain now.  He has no drainage from the wound.    Lab Results   Component Value Date    BUN 18 08/30/2024    CREATSERUM 0.86 08/30/2024    ALB 3.5 10/04/2023    TP 7.3 10/04/2023    A1C 7.4 (A) 07/11/2024         Current Outpatient Medications:     Potassium Chloride ER 10 MEQ Oral Tab CR, Take 1 tablet (10 mEq total) by mouth 2 (two) times daily., Disp: 180 tablet, Rfl: 0    IRBESARTAN 150 MG Oral Tab, TAKE 1 TABLET DAILY, Disp: 90 tablet, Rfl: 0    Niacin ER, Antihyperlipidemic, 500 MG Oral Tab CR, TAKE 1 TABLET NIGHTLY, Disp: 90 tablet, Rfl: 0    isosorbide mononitrate ER 30 MG Oral Tablet 24 Hr, Take 1 tablet (30 mg total) by mouth daily., Disp: 90 tablet, Rfl: 2    Insulin Glulisine (APIDRA SOLOSTAR) 100 UNIT/ML Subcutaneous Solution Pen-injector, Take 22 u with meals plus scale, Disp: 45 mL, Rfl: 1    levothyroxine 75 MCG Oral Tab, Take 1 tablet (75 mcg total) by mouth before breakfast., Disp: 90 tablet, Rfl: 0    TAMSULOSIN 0.4 MG Oral Cap, TAKE 2 CAPSULES DAILY, Disp: 180 capsule, Rfl: 1    collagenase (SANTYL) 250 UNIT/GM External Ointment, Apply 1 Application topically daily., Disp: 90 g, Rfl: 0    Calcium Alginate (ALGICELL CALCIUM DRESSING 2\"X2) External Misc, Apply 1 Application topically daily. (Patient not taking: Reported on 10/3/2024), Disp: 10 each, Rfl: 5    metFORMIN 500 MG Oral Tab, Take 2 tablets (1,000 mg total) by mouth 2 (two) times daily with meals., Disp: 360 tablet, Rfl: 1    Glucose Blood (ACCU-CHEK GUIDE) In Vitro Strip, Test 4-5 times per day.  Diagnoses: E11.649 and Z79.4, Disp: 400 strip, Rfl: 0    pregabalin (LYRICA) 75 MG Oral Cap, Take 1 capsule (75 mg total) by mouth 2 (two) times daily., Disp: 180 capsule, Rfl: 0    insulin aspart (NOVOLOG  FLEXPEN) 100 Units/mL Subcutaneous Solution Pen-injector, Take 16U with meals and ISF 1:20 >120 (Patient not taking: Reported on 10/3/2024), Disp: 48 mL, Rfl: 2    insulin glargine (LANTUS SOLOSTAR) 100 UNIT/ML Subcutaneous Solution Pen-injector, Inject 65 Units into the skin nightly., Disp: 60 mL, Rfl: 1    atorvastatin 40 MG Oral Tab, Take 1 tablet (40 mg total) by mouth nightly., Disp: 90 tablet, Rfl: 1    furosemide 20 MG Oral Tab, Take 1 tablet (20 mg total) by mouth daily., Disp: 90 tablet, Rfl: 1    Glucose Blood (ACCU-CHEK GUIDE) In Vitro Strip, Test glucose two times a day, Disp: 100 strip, Rfl: 3    Blood Glucose Monitoring Suppl (ACCU-CHEK GUIDE) w/Device Does not apply Kit, 1 Device  in the morning and 1 Device before bedtime., Disp: 1 kit, Rfl: 0    Accu-Chek FastClix Lancets Does not apply Misc, 1 Lancet  in the morning and 1 Lancet before bedtime. Test glucose twice daily., Disp: 100 each, Rfl: 5    Empagliflozin (JARDIANCE) 25 MG Oral Tab, Take 1 tablet by mouth daily., Disp: 90 tablet, Rfl: 0    atenolol 25 MG Oral Tab, Take 1 tablet (25 mg total) by mouth daily., Disp: 90 tablet, Rfl: 0    aspirin 81 MG Oral Tab EC, Take 1 tablet (81 mg total) by mouth daily., Disp: , Rfl:     Blood Glucose Monitoring Suppl (ACCU-CHEK GUIDE) w/Device Does not apply Kit, 1 kit by Does not apply route., Disp: , Rfl:     Insulin Pen Needle 31G X 8 MM Does not apply Misc, 4/day, Disp: , Rfl:     Lancets Misc. (ACCU-CHEK FASTCLIX LANCET) Does not apply Kit, , Disp: , Rfl:     Ferrous Sulfate 325 (65 FE) MG Oral Tab, Take 1 tablet (325 mg total) by mouth daily with breakfast., Disp: , Rfl:     Allergies[1]         REVIEW OF SYSTEMS:     Review of Systems (ROS)  This information was obtained from the patient, chart    A comprehensive 10 point review of systems was completed.  Pertinent positives and negatives noted in the the HPI.     Past medical, surgical, family and social history updated where appropriate.    PHYSICAL  EXAM:   /65   Pulse 57   Temp 97.8 °F (36.6 °C)   Resp 16    Estimated body mass index is 32.28 kg/m² as calculated from the following:    Height as of 10/3/24: 66\".    Weight as of 10/3/24: 200 lb (90.7 kg).   Vital signs reviewed.Appears stated age, well groomed.        Calf                      Ankle                            Foot                                           ASSESSMENT AND PLAN:      1. Pressure ulcer of sacral region, unstageable (HCC)    2. 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 Type 2 diabetes mellitus without complication, with long-term current use of insulin (HCC)    4. Essential hypertension    Clinically the wound is completely healed now.  There is some dry skin.  No further medication or dressing needed.  Offloading measures discussed in detail.  He does have medical grade offloading cushion.  Will discharge from wound clinic at this time.  Risks, benefits, and alternatives of current treatment plan discussed in detail.  Questions and concerns addressed. Red flags to RTC or ED reviewed.  Patient (or parent) agrees to plan.      No follow-ups on file.    Also refer to patient instructions.     I spent a total of 20 minutes with this patient's care.  This time included preparing to see the patient (eg, review notes and recent diagnostics), seeing the patient, performing a medically appropriate examination and/or evaluation, counseling and educating the patient, and documenting in the record.          José Briggs M.D.   [facility]   10/10/2024    There are no Patient Instructions on file for this visit.  MISCELLANEOUS INSTRUCTIONS  Supplement with a daily multivitamin   Low salt diet  Intense blood sugar control - Goal Blood sugar below 180 at all times recommended.  Increase protein intake / consider protein supplements - see below  Elevate extremities at all times when sitting / laying down.  No tobacco use     DIETARY MODIFICATIONS  TO HELP WITH WOUND HEALING:          Please follow any restrictions to diet given by your other doctors     Protein: meats, beans, eggs, milk and yogurt particularly Greek yogurt), tofu, soy nuts, soy protein products     Vitamin C: citrus fruits and juices, strawberries, tomatoes, tomato juice, peppers, baked potatoes, spinach, broccoli, cauliflower, [place] sprouts, cabbage     Vitamin A: dark greens, leafy vegetables, orange or yellow vegetables, cantaloupe, fortified dairy products, liver, fortified cereals     Zinc: fortified cereals, red meats, seafood     Consider Mina by [employer] (These are essential branch chain amino acids that help with tissue building and wound healing) and take 2 packets/day. You can order online at [employer] or [employer]     ADDITIONAL REMINDERS:     The treatment plan has been discussed at length with you and your provider. Follow all instructions carefully, it is very important. If you do not follow all instructions, you are at risk of your wound not healing, infection, possible loss of limb and even loss of life.  Please call the clinic at [phone] during regular business hours ( 7:30 AM - 5:30 PM) if you notice increased redness, warmth, pain or pus like drainage or start running a fever greater than 100.3.    For after hour emergencies, please call your primary physician or go to the nearest emergency room.  If your blood pressure is elevated, follow up with your primary care doctor and/or cardiologist as soon as possible.     FOR LEG SWELLING,  LOWER EXTREMITY WOUNDS AND IF USING COMPRESSION:   Managing your edema:    Avoid prolonged standing in one place. It is better to have your calf muscles moving to pump fluid out of the legs.  Elevate leg(s) above the level of the heart when sitting or as much as possible.  Take you diuretics as directed by your physician. Do not skip doses or change doses unless instructed to do so by your physician.  Decrease salt intake, follow  recommended 2 grams daily.  Do not get leg(s) with compression wrap wet. If wraps are too tight as indicated by pain, numbness/tingling or discoloration of toes remove wrap completely and call the wound center @ [phone].       The treatment plan has been discussed at length between you and your provider. Follow all instructions carefully, it is very important. If you do not follow all instructions you are at risk of your wound not healing, infection, possible loss of limb and even loss of life.    COMPRESSION WRAP PATIENT CARE INSTRUCTIONS  DO NOT get compression wrap wet. When showering, use a shower boot.   Please contact wound clinic and if not able to reach, then go to emergency room if ANY of the following occur:   Tingling or numbness in the foot or toes on leg with compression wrap.  Severe pain that cannot be relieved with elevation and any medication instructed per your doctor.  A fever of 100.4°F (38°C) or higher.  Swelling, coldness, or blue-gray discoloration of the toes.  If the compression wrap feels too tight or too loose.  If the compression wrap is damaged or has rough edges that hurt.  If the compression wrap gets wet, you must cut wrap off.   Drainage from compression wrap that smells different than usual.                        [1]   Allergies  Allergen Reactions    Bromocriptine ANAPHYLAXIS and OTHER (SEE COMMENTS)    Clindamycin HIVES    Other OTHER (SEE COMMENTS)     Cycloset

## 2024-10-10 NOTE — PROGRESS NOTES
Weekly Wound Education Note    Teaching Provided To: Patient  Training Topics: Discharge instructions;Cleasing and general instructions;Dressing;Off-loading  Training Method: Demonstration;Explain/Verbal  Training Response: Reinforcement needed        Notes: Pt here for follow up wound care visit to sacral wound. Per provider wound is healed, no need for dressing at this time. Educated patient about continuing to off load sacral area at all times to prevent any future issues. Pt states understanding. Educated patient to call clinic if wound reopens or drainage is noted.

## 2024-10-15 ENCOUNTER — OFFICE VISIT (OUTPATIENT)
Facility: CLINIC | Age: 79
End: 2024-10-15
Payer: MEDICARE

## 2024-10-15 VITALS
WEIGHT: 200 LBS | SYSTOLIC BLOOD PRESSURE: 128 MMHG | DIASTOLIC BLOOD PRESSURE: 66 MMHG | BODY MASS INDEX: 32.14 KG/M2 | HEART RATE: 59 BPM | HEIGHT: 66 IN | OXYGEN SATURATION: 99 %

## 2024-10-15 DIAGNOSIS — Z79.4 TYPE 2 DIABETES MELLITUS WITHOUT COMPLICATION, WITH LONG-TERM CURRENT USE OF INSULIN (HCC): Primary | ICD-10-CM

## 2024-10-15 DIAGNOSIS — E03.9 HYPOTHYROIDISM, UNSPECIFIED TYPE: ICD-10-CM

## 2024-10-15 DIAGNOSIS — E78.2 MIXED HYPERLIPIDEMIA: ICD-10-CM

## 2024-10-15 DIAGNOSIS — E11.9 TYPE 2 DIABETES MELLITUS WITHOUT COMPLICATION, WITH LONG-TERM CURRENT USE OF INSULIN (HCC): Primary | ICD-10-CM

## 2024-10-15 LAB — HEMOGLOBIN A1C: 7.2 % (ref 4.3–5.6)

## 2024-10-15 PROCEDURE — 83036 HEMOGLOBIN GLYCOSYLATED A1C: CPT | Performed by: STUDENT IN AN ORGANIZED HEALTH CARE EDUCATION/TRAINING PROGRAM

## 2024-10-15 PROCEDURE — 99214 OFFICE O/P EST MOD 30 MIN: CPT | Performed by: STUDENT IN AN ORGANIZED HEALTH CARE EDUCATION/TRAINING PROGRAM

## 2024-10-15 NOTE — PATIENT INSTRUCTIONS
Summary of today's visit:  Medication changes:  Continue your current metformin, insulin, Jardiance        General follow up information:  Please let us know if you require any refills at least 1 week prior to your medication running out. If you do run out of medication, please call our office ASAP to request refills (do not wait until your follow up).   Please call our office if sugars at home are consistently greater than 250 or less than 70 for medication adjustment (do not wait until your follow up appointment).  The on-call pager is for emergencies only. If you are a type 1 diabetic and run out of insulin after business hours 8AM-4PM, you may call the on-call pager for a refill to a 24 hour pharmacy. All other refill requests should be requested during business hours.      Return Visit:  [x]  Physician in 3 months for thyroid and diabetes     -Can be video visit if needed     HOW TO TREAT LOW BLOOD SUGAR (Hypoglycemia)  Low blood sugar = Less than 70, or if you start to have symptoms  Symptoms: Shaking or trembling, fast heart rate, extreme hunger, sweating, confusion/difficulty concentrating, dizziness    How to treat a low blood sugar if you are able to eat/drink: The Rule of 15/15  If you are using a continuous glucose monitor that says you are low, but you do not have any symptoms, verify on fingerstick that your blood sugar is actually <70 before treating.   Eat 15 grams of carbs (see examples below)  Check your blood sugar after 15 minutes. If it’s still below your target range, have another serving.   Repeat these steps until sugar is >90. Once it’s in range, if you're nervous about your sugar going low again, have a protein source (ie, a spoonful of peanut butter).   If you have a CGM, you want to look for how your arrow has changed. If you arrow is pointed up or sideways after 15 min, give your CGM more time OR check with a finger stick. Try not to eat more food until at least 15 min after the first BG  check - otherwise you risk spiking your sugar too high.  If you are experiencing symptoms and you are unable to check your blood glucose for any reason, treat the hypoglycemia.  If someone has a low blood sugar and is unconscious: Don’t hesitate to call 911. Use emergency glucagon. If someone is unconscious and glucagon is not available or someone does not know how to use it, call 911 immediately.     To treat a low, carry with you easy, pre-portioned treatment for low blood sugars that are 15G of carbs:   - Children sized squeeze pouch applesauce (high fiber + carbs help prevent too high of a spike)  - Small children's sized juicebox- 15g carb --> 4oz juice box  - Glucose tablets from [email or web address], you can find them near diabetes supplies --> Note, you will need to eat 3-4 tablets to get to 15g of carbs  - Child sized fruit snack pack- look for one with 15 grams of total carbohydrate  - Choice of how to treat your low is important. Complex carbs, or foods that contain fats along with carbs (like chocolate) can slow the absorption of glucose and should NOT be used to treat an emergency low.

## 2024-10-15 NOTE — PROGRESS NOTES
[facility]    Name: Ida Burgess    Date: 10/15/2024    Ida Burgess is a 79 year old male who presents for evaluation of T2DM management.     Chief complaint: Diabetes (T2DM Lab F/u , fingerstick /Last A1C 7.4 24/Eye Exam 24/Foot Exam 24/)       Subjective:    Initial HPI consult in 2023  Initially started on oral medications and then eventually added on insulin     DM hx:  -Diagnosed with diabetes in   -Family history-  not known   -Re: potential DM medication contraindication (if positive, checkbox selected):  [] history of pancreatitis  [] personal/fam hx of medullary thyroid ca/MEN2  [x] UTI/yeast infxn    -Presence of associated DM complications (if positive, checkbox selected):  [x] Macrovascular complications (CAD/CVA/PAD)  [] Neuropathy  [] Retinopathy  [x] Nephropathy  [x] HTN  [x] Hyperlipidemia  [] Stroke/TIA  [] Gastroparesis       Interval Hx 2024  Saw Hendrickson 2024  - Increase Lantus to 48 u daily 2024 > 52 units but takes 68-70 units     Current Regimen for diabetes:   Oral/Injectable medications: Jardiance 25mg, Metformin 1000mg BID   Basal:  Lantus 68-70 units qAM . Injects in abdomen  Prandial:  Apidra 18u with each meal and takes it prior to meal . Also on a 1:20 above 120 correction with a reverse scale if premeal glucose below 100. Injects in abdomen  Missed doses: very rare     2024  Noted symmptoms when he was low and alerted our office  Current regimen: Continue: Jardiance 25mg, Metformin 1000mg BID   Decrease: Lantus to 48 units daily  Novolog 18u with each meal and take it prior to meal .   Continue: 1:20 above 120 correction with a reverse scale if premeal glucose below    Blood Glucose log reviewed. Checking 3 times per day. Morning/fastin-170, episodes of hypoglycemia: Yes69  5p    Previously trialed/failed DM meds:   -He stopped ozempic, bydureon and rybelsus due to GI upset  -He stopped actos due to concern about bladder  cancer     10/15/2024  8/2024 TSH 3.8, free T41.4, calcium 9.8, creatinine 0.86, GFR 88, glucose 119  Was diagnosed with a sacral wound since August 2024, follows with wound care   Current regimen: Jardiance 25 mg daily, metformin 1000 mg twice daily, Lantus 52-54 units daily, NovoLog 18 units 3 times daily with meals, ISF 1-20 above 120  Did miss around a week of medication when he was in [place]; continued on Metformin during that time  Last A1c value was 7.2% done 10/15/2024.     History/Other:    Allergies, PMH, SocHx and FHx reviewed and updated as appropriate in Epic on    Potassium Chloride ER 10 MEQ Oral Tab CR Take 1 tablet (10 mEq total) by mouth 2 (two) times daily. 180 tablet 0    IRBESARTAN 150 MG Oral Tab TAKE 1 TABLET DAILY 90 tablet 0    Niacin ER, Antihyperlipidemic, 500 MG Oral Tab CR TAKE 1 TABLET NIGHTLY 90 tablet 0    isosorbide mononitrate ER 30 MG Oral Tablet 24 Hr Take 1 tablet (30 mg total) by mouth daily. 90 tablet 2    Insulin Glulisine (APIDRA SOLOSTAR) 100 UNIT/ML Subcutaneous Solution Pen-injector Take 22 u with meals plus scale 45 mL 1    levothyroxine 75 MCG Oral Tab Take 1 tablet (75 mcg total) by mouth before breakfast. 90 tablet 0    TAMSULOSIN 0.4 MG Oral Cap TAKE 2 CAPSULES DAILY 180 capsule 1    collagenase (SANTYL) 250 UNIT/GM External Ointment Apply 1 Application topically daily. 90 g 0    Calcium Alginate (ALGICELL CALCIUM DRESSING 2\"X2) External Misc Apply 1 Application topically daily. 10 each 5    metFORMIN 500 MG Oral Tab Take 2 tablets (1,000 mg total) by mouth 2 (two) times daily with meals. 360 tablet 1    Glucose Blood (ACCU-CHEK GUIDE) In Vitro Strip Test 4-5 times per day.  Diagnoses: E11.649 and Z79.4 400 strip 0    pregabalin (LYRICA) 75 MG Oral Cap Take 1 capsule (75 mg total) by mouth 2 (two) times daily. 180 capsule 0    insulin glargine (LANTUS SOLOSTAR) 100 UNIT/ML Subcutaneous Solution Pen-injector Inject 65 Units into the skin nightly. 60 mL 1     atorvastatin 40 MG Oral Tab Take 1 tablet (40 mg total) by mouth nightly. 90 tablet 1    furosemide 20 MG Oral Tab Take 1 tablet (20 mg total) by mouth daily. 90 tablet 1    Glucose Blood (ACCU-CHEK GUIDE) In Vitro Strip Test glucose two times a day 100 strip 3    Blood Glucose Monitoring Suppl (ACCU-CHEK GUIDE) w/Device Does not apply Kit 1 Device  in the morning and 1 Device before bedtime. 1 kit 0    Accu-Chek FastClix Lancets Does not apply Misc 1 Lancet  in the morning and 1 Lancet before bedtime. Test glucose twice daily. 100 each 5    Empagliflozin (JARDIANCE) 25 MG Oral Tab Take 1 tablet by mouth daily. 90 tablet 0    atenolol 25 MG Oral Tab Take 1 tablet (25 mg total) by mouth daily. 90 tablet 0    aspirin 81 MG Oral Tab EC Take 1 tablet (81 mg total) by mouth daily.      Blood Glucose Monitoring Suppl (ACCU-CHEK GUIDE) w/Device Does not apply Kit 1 kit by Does not apply route.      Insulin Pen Needle 31G X 8 MM Does not apply Misc 4/day      Lancets Misc. (ACCU-CHEK FASTCLIX LANCET) Does not apply Kit       Ferrous Sulfate 325 (65 FE) MG Oral Tab Take 1 tablet (325 mg total) by mouth daily with breakfast.       Allergies   Allergen Reactions    Bromocriptine ANAPHYLAXIS and OTHER (SEE COMMENTS)    Clindamycin HIVES    Other OTHER (SEE COMMENTS)     Cycloset     Current Outpatient Medications   Medication Sig Dispense Refill    Potassium Chloride ER 10 MEQ Oral Tab CR Take 1 tablet (10 mEq total) by mouth 2 (two) times daily. 180 tablet 0    IRBESARTAN 150 MG Oral Tab TAKE 1 TABLET DAILY 90 tablet 0    Niacin ER, Antihyperlipidemic, 500 MG Oral Tab CR TAKE 1 TABLET NIGHTLY 90 tablet 0    isosorbide mononitrate ER 30 MG Oral Tablet 24 Hr Take 1 tablet (30 mg total) by mouth daily. 90 tablet 2    Insulin Glulisine (APIDRA SOLOSTAR) 100 UNIT/ML Subcutaneous Solution Pen-injector Take 22 u with meals plus scale 45 mL 1    levothyroxine 75 MCG Oral Tab Take 1 tablet (75 mcg total) by mouth before breakfast. 90  tablet 0    TAMSULOSIN 0.4 MG Oral Cap TAKE 2 CAPSULES DAILY 180 capsule 1    collagenase (SANTYL) 250 UNIT/GM External Ointment Apply 1 Application topically daily. 90 g 0    Calcium Alginate (ALGICELL CALCIUM DRESSING 2\"X2) External Misc Apply 1 Application topically daily. 10 each 5    metFORMIN 500 MG Oral Tab Take 2 tablets (1,000 mg total) by mouth 2 (two) times daily with meals. 360 tablet 1    Glucose Blood (ACCU-CHEK GUIDE) In Vitro Strip Test 4-5 times per day.  Diagnoses: E11.649 and Z79.4 400 strip 0    pregabalin (LYRICA) 75 MG Oral Cap Take 1 capsule (75 mg total) by mouth 2 (two) times daily. 180 capsule 0    insulin glargine (LANTUS SOLOSTAR) 100 UNIT/ML Subcutaneous Solution Pen-injector Inject 65 Units into the skin nightly. 60 mL 1    atorvastatin 40 MG Oral Tab Take 1 tablet (40 mg total) by mouth nightly. 90 tablet 1    furosemide 20 MG Oral Tab Take 1 tablet (20 mg total) by mouth daily. 90 tablet 1    Glucose Blood (ACCU-CHEK GUIDE) In Vitro Strip Test glucose two times a day 100 strip 3    Blood Glucose Monitoring Suppl (ACCU-CHEK GUIDE) w/Device Does not apply Kit 1 Device  in the morning and 1 Device before bedtime. 1 kit 0    Accu-Chek FastClix Lancets Does not apply Misc 1 Lancet  in the morning and 1 Lancet before bedtime. Test glucose twice daily. 100 each 5    Empagliflozin (JARDIANCE) 25 MG Oral Tab Take 1 tablet by mouth daily. 90 tablet 0    atenolol 25 MG Oral Tab Take 1 tablet (25 mg total) by mouth daily. 90 tablet 0    aspirin 81 MG Oral Tab EC Take 1 tablet (81 mg total) by mouth daily.      Blood Glucose Monitoring Suppl (ACCU-CHEK GUIDE) w/Device Does not apply Kit 1 kit by Does not apply route.      Insulin Pen Needle 31G X 8 MM Does not apply Misc 4/day      Lancets Misc. (ACCU-CHEK FASTCLIX LANCET) Does not apply Kit       Ferrous Sulfate 325 (65 FE) MG Oral Tab Take 1 tablet (325 mg total) by mouth daily with breakfast.      insulin aspart (NOVOLOG FLEXPEN) 100 Units/mL  Subcutaneous Solution Pen-injector Take 16U with meals and ISF 1:20 >120 (Patient not taking: Reported on 10/15/2024) 48 mL 2     Past Medical History:    Atherosclerosis of coronary artery    Heart disease    Hyperthyroidism    Other and unspecified hyperlipidemia    Type II or unspecified type diabetes mellitus without mention of complication, not stated as uncontrolled    Unspecified essential hypertension    Vertigo     Past Surgical History:   Procedure Laterality Date    Cabg  1996    4x bypass    Cataract  Ten yrs approx    Bilateral    Colonoscopy  Not sure    Hemorrhoidectomy  Not sure    Sinus surgery         Social History     Socioeconomic History    Marital status:    Tobacco Use    Smoking status: Never     Passive exposure: Never    Smokeless tobacco: Never   Vaping Use    Vaping status: Never Used   Substance and Sexual Activity    Alcohol use: Never    Drug use: Never   Other Topics Concern    Caffeine Concern No    Exercise No    Seat Belt No    Special Diet No    Stress Concern No    Weight Concern No     Social Drivers of Health     Physical Activity: Inactive (10/14/2020)    [facility]    Exercise Vital Sign     Days of Exercise per Week: 0 days     Minutes of Exercise per Session: 0 min     Family History   Problem Relation Age of Onset    Diabetes Mother     Diabetes Father     Hypertension Paternal Grandmother         Sisters    Heart Disease Neg     High Blood Pressure Neg        ROS/Exam    REVIEW OF SYSTEMS: Ten point review of systems has been performed and is otherwise negative and/or non-contributory, except as described above.     VITALS  Vitals:    10/15/24 1457   Weight: 200 lb (90.7 kg)   Height: 5' 6\" (1.676 m)       Wt Readings from Last 6 Encounters:   10/15/24 200 lb (90.7 kg)   10/03/24 200 lb (90.7 kg)   09/12/24 202 lb (91.6 kg)   08/30/24 202 lb (91.6 kg)   07/11/24 202 lb (91.6 kg)   05/28/24 202 lb (91.6 kg)        PHYSICAL EXAM  CONSTITUTIONAL:  awake, alert, cooperative, no apparent distress, and appears stated age  PSYCH: normal affect  LUNGS: breathing comfortably  CARDIOVASCULAR:  regular rate   ENT:  no palpable thyroid nodules          Labs/Imaging: Pertinent imaging reviewed.    Overall glucose control:  Lab Results   Component Value Date    A1C 7.4 (A) 07/11/2024    A1C 7.5 (A) 04/04/2024    A1C 7.6 (A) 01/02/2024    A1C 7.8 (A) 10/03/2023    A1C 7.1 12/02/2022   Thyroid:    Lab Results   Component Value Date    TSH 3.888 08/30/2024    TSH 3.016 03/26/2024    TSH 2.74 12/02/2023    T4F 1.4 08/30/2024    T4F 1.3 03/26/2024    T4F 1.2 12/02/2023        Assessment & Plan:     ICD-10-CM    1. Type 2 diabetes mellitus without complication, with long-term current use of insulin (HCC)  E11.9 POC Hemoglobin A1C    Z79.4       2. Hypothyroidism, unspecified type  E03.9       3. Mixed hyperlipidemia  E78.2           Ida Burgess is a pleasant 79 year old male here for evaluation of:    #Diabetes- PMHx of Type 2 diabetes mellitus diagnosed in 1986.        Last A1c value was 7.4% done 7/11/2024.  Goal <7%. Importance of better glucose control in preventing onset/progression of end-organ damage discussed, as well as goals of therapy and clinical significance of A1C.     - med changes: -Continue Lantus 48 units daily with Novolog 18 u with meals and correction scale  -Continue metformin and Jardiance  - Patient to inform us if glucose is consistently greater than 250 or less than 70  - Continue checking glucose with glucometer    -Surveillance for Diabetes Complications & Risks  Foot exam/neuropathy: Last Foot Exam: 07/11/24  Retinopathy screening: Last Dilated Eye Exam: 02/07/24  No data recorded  CKD/Nephropathy screening:   Lab Results   Component Value Date    EGFRCR 88 08/30/2024    MICROALBCREA 147.2 (H) 03/26/2024      Blood pressure control:   BP Readings from Last 1 Encounters:   10/10/24 149/65   BP Meds: atenolol  Tabs - 25 MG; furosemide Tabs - 20 MG; Irbesartan Tabs - 150 MG     Hyperlipidemia/Lipids:   Lab Results   Component Value Date    LDL 47 03/26/2024    TRIG 98 03/26/2024   Cholesterol Meds: atorvastatin Tabs - 40 MG; Niacin ER (Antihyperlipidemic) Tbcr - 500 MG      Hpothyroidisim  Endorses compliance with his LT4 75mcg   States that he has been on this medication for very long time and it was started by his endocrinologist (Dr. Madrigal)  8/2024 labs stable, continue current LT4 75 mcg daily  Repeat labs prior to our follow up visit     Return in about 3 months (around 1/15/2025).    10/15/2024  Niurka Romano DO    A total of 20 minutes was spent today on obtaining history, reviewing pertinent labs, evaluating patient, providing multiple treatment options, reinforcing diet/exercise and compliance, and completing documentation.      Note to patient: The 21 Century Cures Act makes medical notes like these available to patients in the interest of transparency. However, be advised this is a medical document. It is intended as peer to peer communication. It is written in medical language and may contain abbreviations or verbiage that are unfamiliar. It may appear blunt or direct. Medical documents are intended to carry relevant information, facts as evident, and the clinical opinion of the practitioner.

## 2024-10-17 ENCOUNTER — APPOINTMENT (OUTPATIENT)
Dept: WOUND CARE | Facility: HOSPITAL | Age: 79
End: 2024-10-17
Attending: FAMILY MEDICINE
Payer: MEDICARE

## 2024-10-21 ENCOUNTER — TELEPHONE (OUTPATIENT)
Dept: FAMILY MEDICINE CLINIC | Facility: CLINIC | Age: 79
End: 2024-10-21

## 2024-10-21 RX ORDER — FUROSEMIDE 20 MG/1
20 TABLET ORAL DAILY
Qty: 90 TABLET | Refills: 0 | Status: SHIPPED | OUTPATIENT
Start: 2024-10-21

## 2024-10-21 NOTE — TELEPHONE ENCOUNTER
Patient needs a refill on furosemide 20 MG Oral Tab sent to [id number] - [place], [place] - [address] [phone], [phone]

## 2024-10-21 NOTE — TELEPHONE ENCOUNTER
Requesting     Disp Refills Start End     furosemide 20 MG Oral Tab 90 tablet 1 2/18/2024 --    Sig - Route: Take 1 tablet (20 mg total) by mouth daily. - Oral    Sent to pharmacy as: Furosemide 20 MG Oral Tablet (Lasix)    E-Prescribing Status: Receipt confirmed by pharmacy (2/18/2024  5:54 PM CST)      Wound visit on 10/10/2024  LOV: 8/3/2024 w/Dr. Anna for swelling, wound care  RTC: Return in about 2 weeks, or if symptoms worsen or fail to improve.     Filled:     Dispensed Written Strength Quantity Refills Days Supply Provider Pharmacy    FUROSEMIDE 20MG TAB 05/13/2024  20 90 tablet  90 José Briggs MD [facility] PRESCRIPTI       Future Appointments   Date Time Provider Department Center   10/25/2024 12:30 PM Luis Miguel Gomez MD [facility] [address]   11/5/2024  3:00 PM Elder Mcknight MD [id number] [facility] [place]   1/16/2025  3:00 PM Niurka Romano DO [id number] [facility] [place]     Rx pended  No protocol  Routed to provider for review

## 2024-10-22 DIAGNOSIS — E11.42 POLYNEUROPATHY DUE TO TYPE 2 DIABETES MELLITUS (HCC): ICD-10-CM

## 2024-10-22 NOTE — TELEPHONE ENCOUNTER
Requesting Pregabalin 75mg  Last OV: 8/30/24  RTC: prn  Last Rx'd 6/29/24 #180 with 0 refills    Future Appointments   Date Time Provider Department Center   10/25/2024 12:30 PM Luis Miguel Gomez MD [facility] [address]   11/5/2024  3:00 PM Elder Mcknight MD [id number] [facility] [place]   1/16/2025  3:00 PM Niurka Romano DO [id number] [facility] [place]       Per [place] , last dispensed 6/29/24 #180    Controlled med:  Rx pended and routed for approval/denial

## 2024-10-22 NOTE — TELEPHONE ENCOUNTER
Received request from [facility] for new prescription of Pregabalin 75 mg capsules.

## 2024-10-23 RX ORDER — PREGABALIN 75 MG/1
75 CAPSULE ORAL 2 TIMES DAILY
Qty: 180 CAPSULE | Refills: 0 | Status: SHIPPED | OUTPATIENT
Start: 2024-10-23

## 2024-10-24 ENCOUNTER — APPOINTMENT (OUTPATIENT)
Dept: WOUND CARE | Facility: HOSPITAL | Age: 79
End: 2024-10-24
Attending: FAMILY MEDICINE
Payer: MEDICARE

## 2024-10-24 ENCOUNTER — TELEPHONE (OUTPATIENT)
Dept: FAMILY MEDICINE CLINIC | Facility: CLINIC | Age: 79
End: 2024-10-24

## 2024-10-24 RX ORDER — FUROSEMIDE 20 MG/1
20 TABLET ORAL DAILY
Qty: 90 TABLET | Refills: 0 | Status: SHIPPED | OUTPATIENT
Start: 2024-10-24

## 2024-10-24 NOTE — TELEPHONE ENCOUNTER
Please resend prescription for the Furosemide. Pharmacy states that they did not receive the request.

## 2024-10-24 NOTE — TELEPHONE ENCOUNTER
Disp Refills Start End    FUROSEMIDE 20 MG Oral Tab 90 tablet 0 10/21/2024 --    Sig - Route: TAKE 1 TABLET (20 MG TOTAL) BY MOUTH DAILY. - Oral    Sent to pharmacy as: Furosemide 20 MG Oral Tablet (Lasix)    Notes to Pharmacy: pt request    E-Prescribing Status: Receipt confirmed by pharmacy (10/21/2024  3:09 PM CDT)      RX resent

## 2024-10-25 ENCOUNTER — OFFICE VISIT (OUTPATIENT)
Dept: FAMILY MEDICINE CLINIC | Facility: CLINIC | Age: 79
End: 2024-10-25
Payer: MEDICARE

## 2024-10-25 VITALS
DIASTOLIC BLOOD PRESSURE: 60 MMHG | HEART RATE: 65 BPM | RESPIRATION RATE: 16 BRPM | SYSTOLIC BLOOD PRESSURE: 108 MMHG | WEIGHT: 206 LBS | BODY MASS INDEX: 33.11 KG/M2 | OXYGEN SATURATION: 98 % | HEIGHT: 66 IN

## 2024-10-25 DIAGNOSIS — I10 ESSENTIAL HYPERTENSION: ICD-10-CM

## 2024-10-25 DIAGNOSIS — Z79.4 TYPE 2 DIABETES MELLITUS WITH DIABETIC POLYNEUROPATHY, WITH LONG-TERM CURRENT USE OF INSULIN (HCC): Primary | ICD-10-CM

## 2024-10-25 DIAGNOSIS — S21.202A WOUND OF LEFT SIDE OF BACK, INITIAL ENCOUNTER: ICD-10-CM

## 2024-10-25 DIAGNOSIS — E11.42 TYPE 2 DIABETES MELLITUS WITH DIABETIC POLYNEUROPATHY, WITH LONG-TERM CURRENT USE OF INSULIN (HCC): Primary | ICD-10-CM

## 2024-10-25 PROCEDURE — G2211 COMPLEX E/M VISIT ADD ON: HCPCS | Performed by: FAMILY MEDICINE

## 2024-10-25 PROCEDURE — 99214 OFFICE O/P EST MOD 30 MIN: CPT | Performed by: FAMILY MEDICINE

## 2024-10-25 RX ORDER — ATENOLOL 25 MG/1
25 TABLET ORAL DAILY
Qty: 90 TABLET | Refills: 3 | Status: SHIPPED | OUTPATIENT
Start: 2024-10-25

## 2024-10-25 NOTE — PATIENT INSTRUCTIONS
Thank you for choosing Luis Miguel Gomez MD at [facility]  To Do: Ida Burgess  1. Please take meds as directed.     Call [phone] to schedule the appointment.   Please signup for [employer], which is electronic access to your record if you have not done so.  All your results will post on there.  [email or web address]   You can NOW use [employer] to book your appointments with us, or consider using open access scheduling which is through the [place] website [email or web address] and type in Luis Miguel Gomez MD and follow the links for \"Schedule Online Now\"    To schedule Imaging or tests at [place] call Central Scheduling [phone], Go to [facility] ER Building (For example: CT scans, X rays, Ultrasound, MRI)  Cardiac Testing in ER building Building A second floor Cardiac Testing [phone] (For example: Holter Monitor, Cardiac Stress tests,Event Monitor, or 2D Echocardiograms)  [facility] call [phone] usually in [facility]  Walk in Clinic in [place] at [address] Mon-Fri at 8am-7:30 p.m., and Sat/Sun 9:00a.m.-4:30 p.m.  Also at [address] [place] [address]  Call [phone] for info     Please call our office about any questions regarding your treatment/medicines/tests as a result of today's visit.  For your safety, read the entire package insert of all medicines prescribed to you and be aware of all of the risks of treatment even beyond those discussed today.  All therapies have potential risk of harm or side effects or medication interactions.  It is your duty and for your safety to discuss with the pharmacist and our office with questions, and to notify us and stop treatment if problems arise, but know that our intention is that the benefits outweigh those potential risks and we strive to make you healthier and to improve your quality of life.    Referrals, and Radiology Information:    If your insurance requires a referral to a specialist, please allow 5 business days  to process your referral request.    If Luis Miguel Gomez MD orders a CT or MRI, it may take up to 10 business days to receive approval from your insurance company. Once our office has called informing you that the insurance company approved your testing, please call Central Scheduling at [phone]  Please allow our office 5 business days to contact you regarding any testing results.    Refill policies:   Allow 3 business days for refills; controlled substances may take longer and must be picked up from the office in person.  Narcotic medications can only be filled in 30 day increments and must be refilled at an office visit only.  If your prescription is due for a refill, you may be due for a follow-up appointment.  We cannot refill your maintenance medications at a preventative wellness visit.  To best provide you care, patients receiving maintenance medications need to be seen at least twice a year.

## 2024-10-25 NOTE — PROGRESS NOTES
Subjective:   Patient ID: Ida Burgess is a 79 year old male.    HPI  Mr. Burgess is a very pleasant 79-year-old gentleman with history of diabetes mellitus with diabetic polyneuropathy, hypertension, hyperlipidemia, coronary disease, osteoarthritis, pulmonary hypertension, thrombocytopenia presenting today to establish with me.  He has been taking his meds compliantly.  He sees cardiology and endocrinology.  No fever no cough no chest pain or shortness of breath no nausea no vomiting no abdominal pain.  Uses a cane to ambulate.  He is a retired psychologist.  Otherwise he is quite independent.  He likes to travel outside the [place].  Recently when he was at the [facility] he had sustained a sacral ulcer noted on the right gluteal area and has been seeing wound clinic for it.  He thinks that this had healed already but would like for me to check it.    I had reviewed past medical and family histories together with allergy and medication lists documented.      History/Other:   Review of Systems   Constitutional:  Negative for fatigue and fever.   HENT:  Negative for sore throat and trouble swallowing.    Respiratory:  Negative for cough and shortness of breath.    Cardiovascular:  Negative for chest pain, palpitations and leg swelling.   Gastrointestinal:  Negative for abdominal pain, constipation, diarrhea, nausea and vomiting.   Neurological:  Negative for dizziness, weakness and headaches.     Current Outpatient Medications   Medication Sig Dispense Refill    atenolol 25 MG Oral Tab Take 1 tablet (25 mg total) by mouth daily. 90 tablet 3    furosemide 20 MG Oral Tab Take 1 tablet (20 mg total) by mouth daily. 90 tablet 0    pregabalin (LYRICA) 75 MG Oral Cap Take 1 capsule (75 mg total) by mouth 2 (two) times daily. 180 capsule 0    Potassium Chloride ER 10 MEQ Oral Tab CR Take 1 tablet (10 mEq total) by mouth 2 (two) times daily. 180 tablet 0    IRBESARTAN 150 MG Oral Tab TAKE 1 TABLET DAILY 90 tablet 0    Niacin  ER, Antihyperlipidemic, 500 MG Oral Tab CR TAKE 1 TABLET NIGHTLY 90 tablet 0    isosorbide mononitrate ER 30 MG Oral Tablet 24 Hr Take 1 tablet (30 mg total) by mouth daily. 90 tablet 2    Insulin Glulisine (APIDRA SOLOSTAR) 100 UNIT/ML Subcutaneous Solution Pen-injector Take 22 u with meals plus scale 45 mL 1    levothyroxine 75 MCG Oral Tab Take 1 tablet (75 mcg total) by mouth before breakfast. 90 tablet 0    TAMSULOSIN 0.4 MG Oral Cap TAKE 2 CAPSULES DAILY 180 capsule 1    collagenase (SANTYL) 250 UNIT/GM External Ointment Apply 1 Application topically daily. 90 g 0    Calcium Alginate (ALGICELL CALCIUM DRESSING 2\"X2) External Misc Apply 1 Application topically daily. 10 each 5    metFORMIN 500 MG Oral Tab Take 2 tablets (1,000 mg total) by mouth 2 (two) times daily with meals. 360 tablet 1    Glucose Blood (ACCU-CHEK GUIDE) In Vitro Strip Test 4-5 times per day.  Diagnoses: E11.649 and Z79.4 400 strip 0    insulin glargine (LANTUS SOLOSTAR) 100 UNIT/ML Subcutaneous Solution Pen-injector Inject 65 Units into the skin nightly. (Patient taking differently: Inject 54 Units into the skin nightly.) 60 mL 1    atorvastatin 40 MG Oral Tab Take 1 tablet (40 mg total) by mouth nightly. 90 tablet 1    Glucose Blood (ACCU-CHEK GUIDE) In Vitro Strip Test glucose two times a day 100 strip 3    Blood Glucose Monitoring Suppl (ACCU-CHEK GUIDE) w/Device Does not apply Kit 1 Device  in the morning and 1 Device before bedtime. 1 kit 0    Accu-Chek FastClix Lancets Does not apply Misc 1 Lancet  in the morning and 1 Lancet before bedtime. Test glucose twice daily. 100 each 5    Empagliflozin (JARDIANCE) 25 MG Oral Tab Take 1 tablet by mouth daily. 90 tablet 0    aspirin 81 MG Oral Tab EC Take 1 tablet (81 mg total) by mouth daily.      Blood Glucose Monitoring Suppl (ACCU-CHEK GUIDE) w/Device Does not apply Kit 1 kit by Does not apply route.      Insulin Pen Needle 31G X 8 MM Does not apply Misc 4/day      Lancets Misc. (ACCU-CHEK  FASTCLIX LANCET) Does not apply Kit       Ferrous Sulfate 325 (65 FE) MG Oral Tab Take 1 tablet (325 mg total) by mouth daily with breakfast.      insulin aspart (NOVOLOG FLEXPEN) 100 Units/mL Subcutaneous Solution Pen-injector Take 16U with meals and ISF 1:20 >120 (Patient not taking: Reported on 10/25/2024) 48 mL 2     Allergies:Allergies[1]    Objective:   Physical Exam  Vitals reviewed.   Constitutional:       General: He is not in acute distress.  HENT:      Mouth/Throat:      Mouth: Mucous membranes are moist.      Pharynx: Oropharynx is clear.   Eyes:      General: No scleral icterus.     Conjunctiva/sclera: Conjunctivae normal.   Cardiovascular:      Rate and Rhythm: Normal rate and regular rhythm.      Heart sounds: Normal heart sounds. No murmur heard.  Pulmonary:      Effort: Pulmonary effort is normal. No respiratory distress.      Breath sounds: Normal breath sounds. No wheezing or rales.   Abdominal:      General: There is no distension.      Palpations: Abdomen is soft. There is no mass.      Tenderness: There is no abdominal tenderness.   Musculoskeletal:      Cervical back: Neck supple.      Right lower leg: No edema.      Left lower leg: No edema.   Lymphadenopathy:      Cervical: No cervical adenopathy.   Skin:     General: Skin is warm.             Comments: Well-healed wound noted on the right gluteal region with no erythema no warmth no swelling no tenderness.   Neurological:      General: No focal deficit present.      Mental Status: He is alert.   Psychiatric:         Mood and Affect: Mood normal.         Behavior: Behavior normal.         Thought Content: Thought content normal.         Assessment & Plan:   1. Type 2 diabetes mellitus without complication, with long-term current use of insulin (HCC)   -Stable  - Continue with current medication regimen  - Continue follow-up with endocrinology for management   2. Essential hypertension   -Well-controlled  - Continue current meds   3. Wound of  left side of back, initial encounter   -Healed up and resolved  - We will monitor for recurrence at this point     Follow-up after 6 months or as needed    This note was prepared using Dragon Medical voice recognition dictation software. As a result errors may occur. When identified these errors have been corrected. While every attempt is made to correct errors during dictation discrepancies may still exist          No orders of the defined types were placed in this encounter.      Meds This Visit:  Requested Prescriptions     Signed Prescriptions Disp Refills    atenolol 25 MG Oral Tab 90 tablet 3     Sig: Take 1 tablet (25 mg total) by mouth daily.       Imaging & Referrals:  None         [1]   Allergies  Allergen Reactions    Bromocriptine ANAPHYLAXIS and OTHER (SEE COMMENTS)    Clindamycin HIVES    Other OTHER (SEE COMMENTS)     Cycloset

## 2024-11-05 ENCOUNTER — OFFICE VISIT (OUTPATIENT)
Dept: SURGERY | Facility: CLINIC | Age: 79
End: 2024-11-05
Payer: MEDICARE

## 2024-11-05 VITALS
DIASTOLIC BLOOD PRESSURE: 64 MMHG | SYSTOLIC BLOOD PRESSURE: 130 MMHG | HEART RATE: 57 BPM | BODY MASS INDEX: 32.14 KG/M2 | WEIGHT: 200 LBS | HEIGHT: 66 IN | OXYGEN SATURATION: 98 %

## 2024-11-05 DIAGNOSIS — M62.838 TRAPEZIUS MUSCLE SPASM: ICD-10-CM

## 2024-11-05 DIAGNOSIS — R20.2 NUMBNESS AND TINGLING OF BOTH LEGS: Primary | ICD-10-CM

## 2024-11-05 DIAGNOSIS — R20.0 NUMBNESS AND TINGLING OF BOTH LEGS: Primary | ICD-10-CM

## 2024-11-05 PROCEDURE — 99212 OFFICE O/P EST SF 10 MIN: CPT | Performed by: NEUROLOGICAL SURGERY

## 2024-11-05 PROCEDURE — 1160F RVW MEDS BY RX/DR IN RCRD: CPT | Performed by: NEUROLOGICAL SURGERY

## 2024-11-05 PROCEDURE — 1159F MED LIST DOCD IN RCRD: CPT | Performed by: NEUROLOGICAL SURGERY

## 2024-11-05 RX ORDER — POTASSIUM CHLORIDE 750 MG/1
10 TABLET, EXTENDED RELEASE ORAL 2 TIMES DAILY
COMMUNITY
Start: 2024-10-09

## 2024-11-05 NOTE — PROGRESS NOTES
Established patient:  Reason for follow up:   6 month f/u-pt reports some neck stiffness and tingling on hands   Pt denies any new pain or symptoms     Numeric Rating Scale:       Pain at Present: 6/10

## 2024-11-05 NOTE — PATIENT INSTRUCTIONS
Refill policies:    Allow 2-3 business days for refills; controlled substances may take longer.  Contact your pharmacy at least 5 days prior to running out of medication and have them send an electronic request or submit request through the “request refill” option in your [employer] account.  Refills are not addressed on weekends; covering physicians do not authorize routine medications on weekends.  No narcotics or controlled substances are refilled after noon on Fridays or by on call physicians.  By law, narcotics must be electronically prescribed.  A 30 day supply with no refills is the maximum allowed.  If your prescription is due for a refill, you may be due for a follow up appointment.  To best provide you care, patients receiving routine medications need to be seen at least once a year.  Patients receiving narcotic/controlled substance medications need to be seen at least once every 3 months.  In the event that your preferred pharmacy does not have the requested medication in stock (e.g. Backordered), it is your responsibility to find another pharmacy that has the requested medication available.  We will gladly send a new prescription to that pharmacy at your request.    Scheduling Tests:    If your physician has ordered radiology tests such as MRI or CT scans, please contact Central Scheduling at [phone] right away to schedule the test.  Once scheduled, the [facility] Centralized Referral Team will work with your insurance carrier to obtain pre-certification or prior authorization.  Depending on your insurance carrier, approval may take 3-10 days.  It is highly recommended patients assure they have received an authorization before having a test performed.  If test is done without insurance authorization, patient may be responsible for the entire amount billed.      Precertification and Prior Authorizations:  If your physician has recommended that you have a procedure or additional testing performed the [facility]  Centralized Referral Team will contact your insurance carrier to obtain pre-certification or prior authorization.    You are strongly encouraged to contact your insurance carrier to verify that your procedure/test has been approved and is a COVERED benefit.  Although the [facility] Centralized Referral Team does its due diligence, the insurance carrier gives the disclaimer that \"Although the procedure is authorized, this does not guarantee payment.\"    Ultimately the patient is responsible for payment.   Thank you for your understanding in this matter.  Paperwork Completion:  If you require FMLA or disability paperwork for your recovery, please make sure to either drop it off or have it faxed to our office at [phone]. Be sure the form has your name and date of birth on it.  The form will be faxed to our Forms Department and they will complete it for you.  There is a 25$ fee for all forms that need to be filled out.  Please be aware there is a 10-14 day turnaround time.  You will need to sign a release of information (COY) form if your paperwork does not come with one.  You may call the Forms Department with any questions at [phone].  Their fax number is [phone].

## 2024-11-05 NOTE — PROGRESS NOTES
Neurosurgery Clinic Visit  2024    Ida Burgess PCP:  Luis Miguel Gomez MD    1945 MRN [id number]     HISTORY OF PRESENT ILLNESS:  Ida Burgess is a(n) 79 year old male here for follow-up  He is doing pretty well  No new issues  Tingling his fingers better  Occasional stiffness of his neck and his back  He recently went on safari in [facility]      PHYSICAL EXAMINATION:  Vital Signs:  /64   Pulse 57   Ht 66\"   Wt 200 lb (90.7 kg)   SpO2 98%   BMI 32.28 kg/m²   Awake alert, orient x 3  Follows commands x 4  No Savannah's      REVIEW OF STUDIES:    Previous MRIs reviewed      ASSESSMENT and PLAN:  79-year-old gentleman here for follow-up  He is doing well  No intervention needed  He will call me with any issues  Reviewed films in detail  All questions were answered  Patient appreciative        Time spent on counseling/coordination of care:  15 Minutes    Total time spent with patient:  15 Minutes      Elder Mcknight MD   [facility]  2024  3:29 PM    Dictated but not proofread

## 2024-12-09 DIAGNOSIS — E11.9 TYPE 2 DIABETES MELLITUS WITHOUT COMPLICATION, WITH LONG-TERM CURRENT USE OF INSULIN (HCC): ICD-10-CM

## 2024-12-09 DIAGNOSIS — Z79.4 TYPE 2 DIABETES MELLITUS WITHOUT COMPLICATION, WITH LONG-TERM CURRENT USE OF INSULIN (HCC): ICD-10-CM

## 2024-12-09 RX ORDER — INSULIN GLARGINE 100 [IU]/ML
65 INJECTION, SOLUTION SUBCUTANEOUS NIGHTLY
Qty: 60 ML | Refills: 1 | Status: SHIPPED | OUTPATIENT
Start: 2024-12-09

## 2024-12-09 NOTE — TELEPHONE ENCOUNTER
Endocrine refill protocol for basal insulins     Protocol Criteria: PASSED Reason: N/A    If all below requirements are met, send a 90-day supply with 1 refill per provider protocol.       Verify Appointment with Endocrinology completed in the last 6 months or scheduled in the next 3 months.  Verify A1C has been completed within the last 6 months and is below 8.5%     Last completed office visit:10/15/2024 Niurka Romano DO   Next scheduled Follow up:   Future Appointments   Date Time Provider Department Center   1/16/2025  3:00 PM Niurka Romano DO [id number] [facility]   4/28/2025  1:00 PM Luis Miguel Gomez MD [facility] [address]      Last A1c result: Last A1c value was 7.2% done 10/15/2024.

## 2024-12-12 DIAGNOSIS — Z79.4 TYPE 2 DIABETES MELLITUS WITHOUT COMPLICATION, WITH LONG-TERM CURRENT USE OF INSULIN (HCC): ICD-10-CM

## 2024-12-12 DIAGNOSIS — E11.9 TYPE 2 DIABETES MELLITUS WITHOUT COMPLICATION, WITH LONG-TERM CURRENT USE OF INSULIN (HCC): ICD-10-CM

## 2024-12-12 RX ORDER — INSULIN GLULISINE 100 [IU]/ML
INJECTION, SOLUTION SUBCUTANEOUS
Qty: 15 ML | Refills: 1 | Status: SHIPPED | OUTPATIENT
Start: 2024-12-12

## 2024-12-12 NOTE — TELEPHONE ENCOUNTER
Received call from patient to discuss APIDRA refill- reviewed we have not received this refill request    Patient confirmed would like this to go to [facility]    Current medications per patient:    Apidra: 18 units with each meal   Lantus: 54 units at night   Metformin: 2 tabs AM and 2 tabs PM  Jardiance: 25 mg     Endocrine refill protocol for rapid acting, regular, intermediate, and mixed insulin:    Protocol Criteria:  PASSED Reason: N/A    If all below requirements are met, send a 90-day supply with 1 refill per provider protocol.    Verify appointment with Endocrinology completed in the last 6 months or scheduled in the next 3 months.  Verify A1C has been completed within the last 6 months and is below 8.5%    Last completed office visit: 10/15/2024 Niurka Romano DO   Next scheduled Follow up:   Future Appointments   Date Time Provider Department Center   1/16/2025  3:00 PM Niurka Romano DO [id number] [facility] [place]   4/28/2025  1:00 PM Luis Miguel Gomez MD [facility] [address]      Last A1C result: 7.2% done 10/15/2024.     Routed for reivew

## 2024-12-24 DIAGNOSIS — I27.20 PULMONARY HTN (HCC): ICD-10-CM

## 2024-12-24 NOTE — TELEPHONE ENCOUNTER
Endocrine Refill protocol for oral and injectable diabetic medications    Protocol Criteria:  PASSED  Reason: N/A    If all below requirements are met, send a 90-day supply with 1 refill per provider protocol.    Verify appointment with Endocrinology completed in the last 6 months or scheduled in the next 3 months.  Verify A1C has been completed within the last 6 months and is below 8.5%     Last completed office visit: 10/15/2024 Niurka Romano DO   Next scheduled Follow up:   Future Appointments   Date Time Provider Department Center   1/16/2025  3:00 PM Niurka Romano DO [id number] [facility] [place]   4/28/2025  1:00 PM Luis Miguel Gomez MD [facility] [address]      Last A1c result: Last A1c value was 7.2% done 10/15/2024.     Routed for review.

## 2024-12-26 RX ORDER — EMPAGLIFLOZIN 25 MG/1
25 TABLET, FILM COATED ORAL DAILY
Qty: 30 TABLET | Refills: 0 | Status: SHIPPED | OUTPATIENT
Start: 2024-12-26

## 2024-12-27 ENCOUNTER — VIRTUAL PHONE E/M (OUTPATIENT)
Dept: FAMILY MEDICINE CLINIC | Facility: CLINIC | Age: 79
End: 2024-12-27
Payer: MEDICARE

## 2024-12-27 ENCOUNTER — PATIENT MESSAGE (OUTPATIENT)
Dept: FAMILY MEDICINE CLINIC | Facility: CLINIC | Age: 79
End: 2024-12-27

## 2024-12-27 DIAGNOSIS — M62.830 BACK MUSCLE SPASM: Primary | ICD-10-CM

## 2024-12-27 RX ORDER — ISOSORBIDE MONONITRATE 30 MG/1
30 TABLET, EXTENDED RELEASE ORAL DAILY
Qty: 90 TABLET | Refills: 0 | Status: SHIPPED | OUTPATIENT
Start: 2024-12-27

## 2024-12-27 NOTE — PROGRESS NOTES
Virtual Telephone Check-In    Ida Burgess verbally consents to a Virtual/Telephone Check-In visit on 12/27/24.  Patient has been referred to the [facility] website at [email or web address] to review the yearly Consent to Treat document.    Patient understands and accepts financial responsibility for any deductible, co-insurance and/or co-pays associated with this service.    Duration of the service: 15 minutes      Summary of topics discussed:             Luis Miguel Gomez MD

## 2024-12-27 NOTE — TELEPHONE ENCOUNTER
Rx sent 9/17/24 #90 with 2 refills to [facility]. Patient requesting Rx be sent to local pharmacy.    Rx sent

## 2024-12-27 NOTE — PROGRESS NOTES
Subjective:   Patient ID: Ida Burgess is a 79 year old male.    HPI  Mr Aditya is a very pleasant 79-year-old gentleman with history of diabetes mellitus with polyneuropathy, hypertension, hyperlipidemia, coronary artery disease, osteoarthritis, pulmonary hypertension, thrombocytopenia presenting for phone visit today for back spasms and localizes it on upper and lower back.  He has had this in the past and usually responds to physical therapy.  No recent injury or trauma.  This is associated with numbness and tingling on his back.  He had tried over-the-counter medications but with not much improvement.      I had reviewed past medical and family histories together with allergy and medication lists documented.    History/Other:   Review of Systems   Constitutional:  Negative for fatigue and fever.   Respiratory:  Negative for cough and shortness of breath.    Gastrointestinal:  Negative for abdominal pain, diarrhea, nausea and vomiting.     Current Outpatient Medications   Medication Sig Dispense Refill    ISOSORBIDE MONONITRATE ER 30 MG Oral Tablet 24 Hr Take 1 tablet (30 mg total) by mouth daily. 90 tablet 0    JARDIANCE 25 MG Oral Tab TAKE ONE TABLET BY MOUTH ONE TIME DAILY 30 tablet 0    Insulin Glulisine (APIDRA SOLOSTAR) 100 UNIT/ML Subcutaneous Solution Pen-injector Take 18 u with meals plus scale. Max TDD: 65 units 15 mL 1    LANTUS SOLOSTAR 100 UNIT/ML Subcutaneous Solution Pen-injector INJECT 65 UNITS            SUBCUTANEOUSLY NIGHTLY 60 mL 1    KLOR-CON M10 10 MEQ Oral Tab CR Take 1 tablet (10 mEq total) by mouth 2 (two) times daily.      atenolol 25 MG Oral Tab Take 1 tablet (25 mg total) by mouth daily. 90 tablet 3    furosemide 20 MG Oral Tab Take 1 tablet (20 mg total) by mouth daily. 90 tablet 0    pregabalin (LYRICA) 75 MG Oral Cap Take 1 capsule (75 mg total) by mouth 2 (two) times daily. 180 capsule 0    Potassium Chloride ER 10 MEQ Oral Tab CR Take 1 tablet (10 mEq total) by mouth 2 (two) times  daily. 180 tablet 0    IRBESARTAN 150 MG Oral Tab TAKE 1 TABLET DAILY 90 tablet 0    Niacin ER, Antihyperlipidemic, 500 MG Oral Tab CR TAKE 1 TABLET NIGHTLY 90 tablet 0    levothyroxine 75 MCG Oral Tab Take 1 tablet (75 mcg total) by mouth before breakfast. 90 tablet 0    TAMSULOSIN 0.4 MG Oral Cap TAKE 2 CAPSULES DAILY 180 capsule 1    metFORMIN 500 MG Oral Tab Take 2 tablets (1,000 mg total) by mouth 2 (two) times daily with meals. 360 tablet 1    Glucose Blood (ACCU-CHEK GUIDE) In Vitro Strip Test 4-5 times per day.  Diagnoses: E11.649 and Z79.4 400 strip 0    Glucose Blood (ACCU-CHEK GUIDE) In Vitro Strip Test glucose two times a day 100 strip 3    Blood Glucose Monitoring Suppl (ACCU-CHEK GUIDE) w/Device Does not apply Kit 1 Device  in the morning and 1 Device before bedtime. 1 kit 0    Accu-Chek FastClix Lancets Does not apply Misc 1 Lancet  in the morning and 1 Lancet before bedtime. Test glucose twice daily. 100 each 5    aspirin 81 MG Oral Tab EC Take 1 tablet (81 mg total) by mouth daily.      Blood Glucose Monitoring Suppl (ACCU-CHEK GUIDE) w/Device Does not apply Kit 1 kit by Does not apply route.      Insulin Pen Needle 31G X 8 MM Does not apply Misc 4/day      Lancets Misc. (ACCU-CHEK FASTCLIX LANCET) Does not apply Kit       Ferrous Sulfate 325 (65 FE) MG Oral Tab Take 1 tablet (325 mg total) by mouth daily with breakfast.       Allergies:Allergies[1]    Objective:   Physical Exam  Constitutional:       General: He is not in acute distress.  Neurological:      Mental Status: He is alert.         Assessment & Plan:   1. Back muscle spasm    -Will refer to physical therapy as he has had in the past which usually works for this issue  - Continue with current medication regimen  - Call or come in sooner if symptoms worsen or persist      This note was prepared using Dragon Medical voice recognition dictation software. As a result errors may occur. When identified these errors have been corrected. While  every attempt is made to correct errors during dictation discrepancies may still exist            No orders of the defined types were placed in this encounter.      Meds This Visit:  Requested Prescriptions      No prescriptions requested or ordered in this encounter       Imaging & Referrals:  OP REFERRAL TO [facility]         [1]   Allergies  Allergen Reactions    Bromocriptine ANAPHYLAXIS and OTHER (SEE COMMENTS)    Clindamycin HIVES    Other OTHER (SEE COMMENTS)     Cycloset

## 2024-12-30 DIAGNOSIS — Z79.4 TYPE 2 DIABETES MELLITUS WITHOUT COMPLICATION, WITH LONG-TERM CURRENT USE OF INSULIN (HCC): Primary | ICD-10-CM

## 2024-12-30 DIAGNOSIS — E11.9 TYPE 2 DIABETES MELLITUS WITHOUT COMPLICATION, WITH LONG-TERM CURRENT USE OF INSULIN (HCC): Primary | ICD-10-CM

## 2024-12-30 NOTE — TELEPHONE ENCOUNTER
Physical therapy orders placed for:    [facility]  [address] [place] [address] [address] [address]  Phone: [phone]    Order faxed to [phone].  Fax confirmation received.    MCM sent to patient.

## 2024-12-30 NOTE — TELEPHONE ENCOUNTER
RN returning patient's call. Patient wondering why recent prescription for Jardiance (called into the [facility] on 12/26/24) was ordered as a 30 day supply only. Patient states he needs a 90 day supply.    Jardiance was ordered from a local pharmacy because he was out of medication. RN notified patient that's why only 30 tabs were prescribed; to hold him over until mail order arrived. Patient is asking for a mail order 90 day supply of Jardiance 25 mg be called into [facility].    Jardiance 25 mg tablets  Sig: one tablet daily  #90  Refill 0    Above refill pended and routed to Dr. Romano for approval.    Message routed to Dr. Romano.

## 2025-01-10 ENCOUNTER — PATIENT MESSAGE (OUTPATIENT)
Facility: CLINIC | Age: 80
End: 2025-01-10

## 2025-01-10 DIAGNOSIS — E11.9 TYPE 2 DIABETES MELLITUS WITHOUT COMPLICATION, WITH LONG-TERM CURRENT USE OF INSULIN (HCC): ICD-10-CM

## 2025-01-10 DIAGNOSIS — Z79.4 TYPE 2 DIABETES MELLITUS WITHOUT COMPLICATION, WITH LONG-TERM CURRENT USE OF INSULIN (HCC): ICD-10-CM

## 2025-01-10 RX ORDER — EMPAGLIFLOZIN 25 MG/1
25 TABLET, FILM COATED ORAL DAILY
Qty: 30 TABLET | Refills: 0 | OUTPATIENT
Start: 2025-01-10

## 2025-01-13 RX ORDER — INSULIN GLULISINE 100 [IU]/ML
INJECTION, SOLUTION SUBCUTANEOUS
Qty: 60 ML | Refills: 1 | Status: SHIPPED | OUTPATIENT
Start: 2025-01-13

## 2025-01-13 NOTE — TELEPHONE ENCOUNTER
Endocrine refill protocol for rapid acting, regular, intermediate, and mixed insulin:    Protocol Criteria:  PASSED Reason: N/A    If all below requirements are met, send a 90-day supply with 1 refill per provider protocol.    Verify appointment with Endocrinology completed in the last 6 months or scheduled in the next 3 months.  Verify A1C has been completed within the last 6 months and is below 8.5%  Last completed office visit: 10/15/2024 Niurka Romano DO   Next scheduled Follow up:   Future Appointments   Date Time Provider Department Center   1/16/2025  3:00 PM Niurka Romano DO [id number] [facility] [place]   4/28/2025  1:00 PM Luis Miguel Gomez MD [facility] [address] [facility] [address]      Last A1C result: 7.2% done 10/15/2024.

## 2025-01-16 ENCOUNTER — OFFICE VISIT (OUTPATIENT)
Facility: CLINIC | Age: 80
End: 2025-01-16
Payer: MEDICARE

## 2025-01-16 VITALS
OXYGEN SATURATION: 100 % | BODY MASS INDEX: 32 KG/M2 | DIASTOLIC BLOOD PRESSURE: 68 MMHG | HEIGHT: 66 IN | SYSTOLIC BLOOD PRESSURE: 126 MMHG | HEART RATE: 59 BPM

## 2025-01-16 DIAGNOSIS — E03.9 HYPOTHYROIDISM, UNSPECIFIED TYPE: ICD-10-CM

## 2025-01-16 DIAGNOSIS — E78.2 MIXED HYPERLIPIDEMIA: ICD-10-CM

## 2025-01-16 DIAGNOSIS — Z79.4 TYPE 2 DIABETES MELLITUS WITHOUT COMPLICATION, WITH LONG-TERM CURRENT USE OF INSULIN (HCC): Primary | ICD-10-CM

## 2025-01-16 DIAGNOSIS — E11.9 TYPE 2 DIABETES MELLITUS WITHOUT COMPLICATION, WITH LONG-TERM CURRENT USE OF INSULIN (HCC): Primary | ICD-10-CM

## 2025-01-16 LAB — HEMOGLOBIN A1C: 7.4 % (ref 4.3–5.6)

## 2025-01-16 PROCEDURE — 83036 HEMOGLOBIN GLYCOSYLATED A1C: CPT | Performed by: STUDENT IN AN ORGANIZED HEALTH CARE EDUCATION/TRAINING PROGRAM

## 2025-01-16 PROCEDURE — 1160F RVW MEDS BY RX/DR IN RCRD: CPT | Performed by: STUDENT IN AN ORGANIZED HEALTH CARE EDUCATION/TRAINING PROGRAM

## 2025-01-16 PROCEDURE — 99214 OFFICE O/P EST MOD 30 MIN: CPT | Performed by: STUDENT IN AN ORGANIZED HEALTH CARE EDUCATION/TRAINING PROGRAM

## 2025-01-16 PROCEDURE — 1159F MED LIST DOCD IN RCRD: CPT | Performed by: STUDENT IN AN ORGANIZED HEALTH CARE EDUCATION/TRAINING PROGRAM

## 2025-01-16 RX ORDER — BLOOD SUGAR DIAGNOSTIC
STRIP MISCELLANEOUS
Qty: 100 EACH | Refills: 3 | Status: SHIPPED | OUTPATIENT
Start: 2025-01-16

## 2025-01-16 NOTE — PROGRESS NOTES
[facility] Note    Name: Ida Burgess    Date: 2025    Ida Burgess is a 79 year old male who presents for evaluation of T2DM management.     Chief complaint: Diabetes (T2dm Followup /Last A1C 7.2 10/15/24)       Subjective:    Initial HPI consult in 2023  Initially started on oral medications and then eventually added on insulin     DM hx:  -Diagnosed with diabetes in   -Family history-  not known   -Re: potential DM medication contraindication (if positive, checkbox selected):  [] history of pancreatitis  [] personal/fam hx of medullary thyroid ca/MEN2  [x] UTI/yeast infxn    -Presence of associated DM complications (if positive, checkbox selected):  [x] Macrovascular complications (CAD/CVA/PAD)  [] Neuropathy  [] Retinopathy  [x] Nephropathy  [x] HTN  [x] Hyperlipidemia  [] Stroke/TIA  [] Gastroparesis       Interval Hx 2024  Saw Hendrickson 2024  - Increase Lantus to 48 u daily 2024 > 52 units but takes 68-70 units     Current Regimen for diabetes:   Oral/Injectable medications: Jardiance 25mg, Metformin 1000mg BID   Basal:  Lantus 68-70 units qAM . Injects in abdomen  Prandial:  Apidra 18u with each meal and takes it prior to meal . Also on a 1:20 above 120 correction with a reverse scale if premeal glucose below 100. Injects in abdomen  Missed doses: very rare     2024  Noted symmptoms when he was low and alerted our office  Current regimen: Continue: Jardiance 25mg, Metformin 1000mg BID   Decrease: Lantus to 48 units daily  Novolog 18u with each meal and take it prior to meal .   Continue: 1:20 above 120 correction with a reverse scale if premeal glucose below    Blood Glucose log reviewed. Checking 3 times per day. Morning/fastin-170, episodes of hypoglycemia: Yes69  5p    Previously trialed/failed DM meds:   -He stopped ozempic, bydureon and rybelsus due to GI upset  -He stopped actos due to concern about bladder cancer     10/15/2024  2024 TSH 3.8, free T41.4,  calcium 9.8, creatinine 0.86, GFR 88, glucose 119  Was diagnosed with a sacral wound since August 2024, follows with wound care   Current regimen: Jardiance 25 mg daily, metformin 1000 mg twice daily, Lantus 54 units at night, NovoLog 18 units 3 times daily with meals, ISF 1-20 above 120  Did miss around a week of medication when he was in [place]; continued on Metformin during that time  Last A1c value was 7.2% done 10/15/2024.     1/16/2025  Current regimen: Jardiance 25 mg daily, metformin 1000 mg twice daily, Lantus 54 units daily, NovoLog 18 units 3 times daily with meals, ISF 1-20 above 120 (rarely uses it)   Fasting   Last A1c value was 7.4% done 1/16/2025    History/Other:    Allergies, PMH, SocHx and FHx reviewed and updated as appropriate in Epic on    Insulin Glulisine (APIDRA SOLOSTAR) 100 UNIT/ML Subcutaneous Solution Pen-injector Take 18 u with meals plus scale. Max TDD: 70 units 63 mL 1    Glucose Blood (ACCU-CHEK GUIDE TEST) In Vitro Strip Test glucose two times a day 100 each 3    metFORMIN 500 MG Oral Tab Take 2 tablets (1,000 mg total) by mouth 2 (two) times daily with meals. 360 tablet 1    empagliflozin (JARDIANCE) 25 MG Oral Tab Take 1 tablet (25 mg total) by mouth daily. 90 tablet 0    ISOSORBIDE MONONITRATE ER 30 MG Oral Tablet 24 Hr Take 1 tablet (30 mg total) by mouth daily. 90 tablet 0    JARDIANCE 25 MG Oral Tab TAKE ONE TABLET BY MOUTH ONE TIME DAILY 30 tablet 0    LANTUS SOLOSTAR 100 UNIT/ML Subcutaneous Solution Pen-injector INJECT 65 UNITS            SUBCUTANEOUSLY NIGHTLY 60 mL 1    KLOR-CON M10 10 MEQ Oral Tab CR Take 1 tablet (10 mEq total) by mouth 2 (two) times daily.      atenolol 25 MG Oral Tab Take 1 tablet (25 mg total) by mouth daily. 90 tablet 3    furosemide 20 MG Oral Tab Take 1 tablet (20 mg total) by mouth daily. 90 tablet 0    pregabalin (LYRICA) 75 MG Oral Cap Take 1 capsule (75 mg total) by mouth 2 (two) times daily. 180 capsule 0    Potassium Chloride ER 10 MEQ  Oral Tab CR Take 1 tablet (10 mEq total) by mouth 2 (two) times daily. 180 tablet 0    IRBESARTAN 150 MG Oral Tab TAKE 1 TABLET DAILY 90 tablet 0    Niacin ER, Antihyperlipidemic, 500 MG Oral Tab CR TAKE 1 TABLET NIGHTLY 90 tablet 0    levothyroxine 75 MCG Oral Tab Take 1 tablet (75 mcg total) by mouth before breakfast. 90 tablet 0    TAMSULOSIN 0.4 MG Oral Cap TAKE 2 CAPSULES DAILY 180 capsule 1    Glucose Blood (ACCU-CHEK GUIDE) In Vitro Strip Test 4-5 times per day.  Diagnoses: E11.649 and Z79.4 400 strip 0    Blood Glucose Monitoring Suppl (ACCU-CHEK GUIDE) w/Device Does not apply Kit 1 Device  in the morning and 1 Device before bedtime. 1 kit 0    Accu-Chek FastClix Lancets Does not apply Misc 1 Lancet  in the morning and 1 Lancet before bedtime. Test glucose twice daily. 100 each 5    aspirin 81 MG Oral Tab EC Take 1 tablet (81 mg total) by mouth daily.      Blood Glucose Monitoring Suppl (ACCU-CHEK GUIDE) w/Device Does not apply Kit 1 kit by Does not apply route.      Insulin Pen Needle 31G X 8 MM Does not apply Misc 4/day      Lancets Misc. (ACCU-CHEK FASTCLIX LANCET) Does not apply Kit       Ferrous Sulfate 325 (65 FE) MG Oral Tab Take 1 tablet (325 mg total) by mouth daily with breakfast.       Allergies   Allergen Reactions    Bromocriptine ANAPHYLAXIS and OTHER (SEE COMMENTS)    Clindamycin HIVES    Other OTHER (SEE COMMENTS)     Cycloset     Current Outpatient Medications   Medication Sig Dispense Refill    Insulin Glulisine (APIDRA SOLOSTAR) 100 UNIT/ML Subcutaneous Solution Pen-injector Take 18 u with meals plus scale. Max TDD: 70 units 63 mL 1    Glucose Blood (ACCU-CHEK GUIDE TEST) In Vitro Strip Test glucose two times a day 100 each 3    metFORMIN 500 MG Oral Tab Take 2 tablets (1,000 mg total) by mouth 2 (two) times daily with meals. 360 tablet 1    empagliflozin (JARDIANCE) 25 MG Oral Tab Take 1 tablet (25 mg total) by mouth daily. 90 tablet 0    ISOSORBIDE MONONITRATE ER 30 MG Oral Tablet 24 Hr  Take 1 tablet (30 mg total) by mouth daily. 90 tablet 0    JARDIANCE 25 MG Oral Tab TAKE ONE TABLET BY MOUTH ONE TIME DAILY 30 tablet 0    LANTUS SOLOSTAR 100 UNIT/ML Subcutaneous Solution Pen-injector INJECT 65 UNITS            SUBCUTANEOUSLY NIGHTLY 60 mL 1    KLOR-CON M10 10 MEQ Oral Tab CR Take 1 tablet (10 mEq total) by mouth 2 (two) times daily.      atenolol 25 MG Oral Tab Take 1 tablet (25 mg total) by mouth daily. 90 tablet 3    furosemide 20 MG Oral Tab Take 1 tablet (20 mg total) by mouth daily. 90 tablet 0    pregabalin (LYRICA) 75 MG Oral Cap Take 1 capsule (75 mg total) by mouth 2 (two) times daily. 180 capsule 0    Potassium Chloride ER 10 MEQ Oral Tab CR Take 1 tablet (10 mEq total) by mouth 2 (two) times daily. 180 tablet 0    IRBESARTAN 150 MG Oral Tab TAKE 1 TABLET DAILY 90 tablet 0    Niacin ER, Antihyperlipidemic, 500 MG Oral Tab CR TAKE 1 TABLET NIGHTLY 90 tablet 0    levothyroxine 75 MCG Oral Tab Take 1 tablet (75 mcg total) by mouth before breakfast. 90 tablet 0    TAMSULOSIN 0.4 MG Oral Cap TAKE 2 CAPSULES DAILY 180 capsule 1    Glucose Blood (ACCU-CHEK GUIDE) In Vitro Strip Test 4-5 times per day.  Diagnoses: E11.649 and Z79.4 400 strip 0    Blood Glucose Monitoring Suppl (ACCU-CHEK GUIDE) w/Device Does not apply Kit 1 Device  in the morning and 1 Device before bedtime. 1 kit 0    Accu-Chek FastClix Lancets Does not apply Misc 1 Lancet  in the morning and 1 Lancet before bedtime. Test glucose twice daily. 100 each 5    aspirin 81 MG Oral Tab EC Take 1 tablet (81 mg total) by mouth daily.      Blood Glucose Monitoring Suppl (ACCU-CHEK GUIDE) w/Device Does not apply Kit 1 kit by Does not apply route.      Insulin Pen Needle 31G X 8 MM Does not apply Misc 4/day      Lancets Misc. (ACCU-CHEK FASTCLIX LANCET) Does not apply Kit       Ferrous Sulfate 325 (65 FE) MG Oral Tab Take 1 tablet (325 mg total) by mouth daily with breakfast.      Glucose Blood (ACCU-CHEK GUIDE) In Vitro Strip Test glucose  two times a day 100 strip 3     Past Medical History:    Atherosclerosis of coronary artery    Heart disease    Hyperthyroidism    Other and unspecified hyperlipidemia    Type II or unspecified type diabetes mellitus without mention of complication, not stated as uncontrolled    Unspecified essential hypertension    Vertigo     Past Surgical History:   Procedure Laterality Date    Cabg  1996    4x bypass    Cataract  Ten yrs approx    Bilateral    Colonoscopy  Not sure    Hemorrhoidectomy  Not sure    Sinus surgery         Social History     Socioeconomic History    Marital status:    Tobacco Use    Smoking status: Never     Passive exposure: Never    Smokeless tobacco: Never   Vaping Use    Vaping status: Never Used   Substance and Sexual Activity    Alcohol use: Never    Drug use: Never   Other Topics Concern    Caffeine Concern No    Exercise No    Seat Belt No    Special Diet No    Stress Concern No    Weight Concern No     Social Drivers of Health     Physical Activity: Inactive (10/14/2020)    [facility]    Exercise Vital Sign     Days of Exercise per Week: 0 days     Minutes of Exercise per Session: 0 min     Family History   Problem Relation Age of Onset    Diabetes Mother     Diabetes Father     Hypertension Paternal Grandmother         Sisters    Heart Disease Neg     High Blood Pressure Neg        ROS/Exam    REVIEW OF SYSTEMS: Ten point review of systems has been performed and is otherwise negative and/or non-contributory, except as described above.     VITALS  Vitals:    01/16/25 1504   BP: 126/68   Pulse: 59   SpO2: 100%   Height: 5' 6\" (1.676 m)       Wt Readings from Last 6 Encounters:   11/05/24 200 lb (90.7 kg)   10/25/24 206 lb (93.4 kg)   10/15/24 200 lb (90.7 kg)   10/03/24 200 lb (90.7 kg)   09/12/24 202 lb (91.6 kg)   08/30/24 202 lb (91.6 kg)       PHYSICAL EXAM  CONSTITUTIONAL:  awake, alert, cooperative, no apparent distress, and  appears stated age  PSYCH: normal affect  LUNGS: breathing comfortably  CARDIOVASCULAR:  regular rate   ENT:  no thyromegaly          Labs/Imaging: Pertinent imaging reviewed.    Overall glucose control:  Lab Results   Component Value Date    A1C 7.4 (A) 01/16/2025    A1C 7.2 (A) 10/15/2024    A1C 7.4 (A) 07/11/2024    A1C 7.5 (A) 04/04/2024    A1C 7.6 (A) 01/02/2024   Thyroid:    Lab Results   Component Value Date    TSH 3.888 08/30/2024    TSH 3.016 03/26/2024    TSH 2.74 12/02/2023    T4F 1.4 08/30/2024    T4F 1.3 03/26/2024    T4F 1.2 12/02/2023        Assessment & Plan:     ICD-10-CM    1. Type 2 diabetes mellitus without complication, with long-term current use of insulin (HCC)  E11.9 POC Hemoglobin A1C    Z79.4 Glucose Blood (ACCU-CHEK GUIDE TEST) In Vitro Strip     metFORMIN 500 MG Oral Tab      2. Hypothyroidism, unspecified type  E03.9       3. Mixed hyperlipidemia  E78.2 Lipid Panel [E]            Ida Burgess is a pleasant 79 year old male here for evaluation of:    #Diabetes- PMHx of Type 2 diabetes mellitus diagnosed in 1986.        Last A1c value was 7.4% done 1/16/2025.  Goal <7%. Importance of better glucose control in preventing onset/progression of end-organ damage discussed, as well as goals of therapy and clinical significance of A1C.     - med changes: -Continue Lantus 54 units daily with Novolog 18 u with meals and correction scale  -Continue metformin and Jardiance  - Patient to inform us if glucose is consistently greater than 250 or less than 70  - Continue checking glucose with glucometer    -Surveillance for Diabetes Complications & Risks  Foot exam/neuropathy: Last Foot Exam: 07/11/24  Retinopathy screening: Last Dilated Eye Exam: 02/07/24  No data recorded  CKD/Nephropathy screening:   Lab Results   Component Value Date    EGFRCR 88 08/30/2024    MICROALBCREA 147.2 (H) 03/26/2024      Blood pressure control:   BP Readings from Last 1 Encounters:   01/16/25 126/68   BP Meds: atenolol  Tabs - 25 MG; furosemide Tabs - 20 MG; Irbesartan Tabs - 150 MG     Hyperlipidemia/Lipids:   Lab Results   Component Value Date    LDL 47 03/26/2024    TRIG 98 03/26/2024   Cholesterol Meds: Niacin ER (Antihyperlipidemic) Tbcr - 500 MG   LDL at goal, repeat labs prior to follow-up visit    Hpothyroidisim  Endorses compliance with his LT4 75mcg   States that he has been on this medication for very long time and it was started by his endocrinologist (Dr. Madrigal)  8/2024 labs stable, continue current LT4 75 mcg daily  Repeat labs prior to our follow up visit     Return in about 3 months (around 4/16/2025).    1/16/2025  Niurka Romano DO    A total of 20 minutes was spent today on obtaining history, reviewing pertinent labs, evaluating patient, providing multiple treatment options, reinforcing diet/exercise and compliance, and completing documentation.      Note to patient: The 21 Century Cures Act makes medical notes like these available to patients in the interest of transparency. However, be advised this is a medical document. It is intended as peer to peer communication. It is written in medical language and may contain abbreviations or verbiage that are unfamiliar. It may appear blunt or direct. Medical documents are intended to carry relevant information, facts as evident, and the clinical opinion of the practitioner.

## 2025-01-21 RX ORDER — FUROSEMIDE 20 MG/1
20 TABLET ORAL DAILY
Qty: 90 TABLET | Refills: 0 | Status: SHIPPED | OUTPATIENT
Start: 2025-01-21

## 2025-01-24 ENCOUNTER — PATIENT MESSAGE (OUTPATIENT)
Facility: CLINIC | Age: 80
End: 2025-01-24

## 2025-01-24 DIAGNOSIS — E11.42 POLYNEUROPATHY DUE TO TYPE 2 DIABETES MELLITUS (HCC): ICD-10-CM

## 2025-01-24 DIAGNOSIS — I27.20 PULMONARY HTN (HCC): ICD-10-CM

## 2025-01-24 RX ORDER — POTASSIUM CHLORIDE 750 MG/1
10 TABLET, EXTENDED RELEASE ORAL 2 TIMES DAILY
Qty: 180 TABLET | Refills: 0 | Status: SHIPPED | OUTPATIENT
Start: 2025-01-24

## 2025-01-24 RX ORDER — PREGABALIN 75 MG/1
75 CAPSULE ORAL 2 TIMES DAILY
Qty: 180 CAPSULE | Refills: 0 | Status: SHIPPED | OUTPATIENT
Start: 2025-01-24

## 2025-01-28 ENCOUNTER — TELEPHONE (OUTPATIENT)
Facility: CLINIC | Age: 80
End: 2025-01-28

## 2025-01-28 ENCOUNTER — TELEPHONE (OUTPATIENT)
Dept: FAMILY MEDICINE CLINIC | Facility: CLINIC | Age: 80
End: 2025-01-28

## 2025-01-28 DIAGNOSIS — I10 ESSENTIAL HYPERTENSION: ICD-10-CM

## 2025-01-28 DIAGNOSIS — E11.9 TYPE 2 DIABETES MELLITUS WITHOUT COMPLICATION, WITH LONG-TERM CURRENT USE OF INSULIN (HCC): Primary | ICD-10-CM

## 2025-01-28 DIAGNOSIS — Z79.4 TYPE 2 DIABETES MELLITUS WITHOUT COMPLICATION, WITH LONG-TERM CURRENT USE OF INSULIN (HCC): Primary | ICD-10-CM

## 2025-01-28 DIAGNOSIS — E78.2 MIXED HYPERLIPIDEMIA: ICD-10-CM

## 2025-01-28 RX ORDER — ATENOLOL 25 MG/1
25 TABLET ORAL DAILY
Qty: 90 TABLET | Refills: 3 | Status: SHIPPED | OUTPATIENT
Start: 2025-01-28

## 2025-01-28 NOTE — TELEPHONE ENCOUNTER
.LOV: 1/16/25     Next office visit: 4/17/25     Refill request: Atenolol 25 mg     Order pended and routed to provider

## 2025-01-28 NOTE — TELEPHONE ENCOUNTER
Fax received from [facility] requesting new rx for Atrovastatin Calcium 40 mg tab.       [facility], [phone], [phone]  [facility] [place] [address]  Phone: [phone] Fax: [phone]

## 2025-01-30 RX ORDER — ATORVASTATIN CALCIUM 40 MG/1
40 TABLET, FILM COATED ORAL NIGHTLY
Qty: 90 TABLET | Refills: 0 | Status: SHIPPED | OUTPATIENT
Start: 2025-01-30 | End: 2025-07-29

## 2025-01-30 NOTE — TELEPHONE ENCOUNTER
[id number] message sent to patient.    Restrict salt to no more than 2,000mg a day  -No more than 100mg of salt in a snack  -No more than 250mg of salt in an entree  -No more than 500mg of salt in an entire meal    Increase cardiovascular exercise to 30 minutes of brisk walking a day for 4-5 days a week.  You can use a stationary bike or swim for 30 minutes a day instead of walking.  Whatever exercise you choose, make sure you are working hard enough to increase your heart rate.     Please do daily weights and notify cardiology if you gain 3 pounds in a day or 5 pounds in a week.    Tips for Using Less Salt    Most people with heart problems need to eat less salt (sodium). Reducing the amount of salt you eat may help control your blood pressure. The higher your blood pressure, the greater your risk for heart disease, stroke, blindness, and kidney problems.  At the store  · Make low-salt choices by reading labels carefully. Look for the total amount of sodium per serving.  · Use more fresh food. Buy more fruits and vegetables. Select lean meats, fish, and poultry.  · Use fewer frozen, canned, and packaged foods which often contain a lot of sodium.  · Use plain frozen vegetables without sauces or toppings. These products are often low- or no-sodium.  · Opt for reduced-sodium or no-salt-added versions of canned vegetables and soups.  In the kitchen  · Don't add salt to food when you're cooking. Season with flavorings such as onion, garlic, pepper, salt-free herbal blends, and lemon or lime juice.  · Use a cookbook containing low-salt recipes. It can give you ideas for tasty meals that are healthy for your heart.  · Sprinkle salt-free herbal blends on vegetables and meat.  · Drain and rinse canned foods, such as canned beans and vegetables, before cooking or eating.  Eating out  · Tell the  you're on a low-salt diet. Ask questions about the menu.  · Order fish, chicken, and meat broiled, baked, poached, or grilled without salt, butter, or  breading.  · Use lemon, pepper, and salt-free herb mixes to add flavor.  · Choose plain steamed rice, boiled noodles, and baked or boiled potatoes. Top potatoes with chives and a little sour cream.     Beware! Salt goes by many other names. Limit foods with these words listed as ingredients: salt, sodium, soy sauce, baking soda, baking powder, MSG, monosodium, Na (the chemical symbol for sodium). Some antacids are also high in salt.   Date Last Reviewed: 6/19/2015  © 7984-4606 [employer]. [address] [place] [address], [place] [address]. All rights reserved. This information is not intended as a substitute for professional medical care. Always follow your healthcare professional's instructions.

## 2025-01-30 NOTE — TELEPHONE ENCOUNTER
Endocrine refill protocol for lipid lowering medications    Protocol Criteria:  PASSED Reason: N/A    If all below requirements are met, send a 90-day supply with 1 refill per provider protocol.    Verify appointment with Endocrinology completed in the last 6 months or scheduled in the next 3 months.  Lipid panel must have been completed in the last 12 months   ALT result below 80  LDL result below 130    Last completed office visit:1/16/2025 Niurka Romano DO   Next scheduled Follow up:   Future Appointments   Date Time Provider Department Center   4/17/2025  3:30 PM Niurka Romano DO [id number] [place]   4/28/2025  1:00 PM Luis Miguel Gomez MD [facility] [address]      Last Lipid panel date: Cholesterol: 113, done on 3/26/2024.  HDL Cholesterol: 47, done on 3/26/2024.  TriGlycerides 98, done on 3/26/2024.  LDL Cholesterol: 47, done on 3/26/2024.     Last ALT result: Last ALT was 28 done on 10/4/2023.  Last AST was 14 done on 10/4/2023.

## 2025-02-14 ENCOUNTER — PATIENT MESSAGE (OUTPATIENT)
Dept: FAMILY MEDICINE CLINIC | Facility: CLINIC | Age: 80
End: 2025-02-14

## 2025-03-10 ENCOUNTER — TELEPHONE (OUTPATIENT)
Facility: CLINIC | Age: 80
End: 2025-03-10

## 2025-03-10 RX ORDER — IRBESARTAN 150 MG/1
150 TABLET ORAL DAILY
Qty: 90 TABLET | Refills: 0 | Status: SHIPPED | OUTPATIENT
Start: 2025-03-10

## 2025-03-16 DIAGNOSIS — E78.2 MIXED HYPERLIPIDEMIA: ICD-10-CM

## 2025-03-17 RX ORDER — ATORVASTATIN CALCIUM 40 MG/1
40 TABLET, FILM COATED ORAL NIGHTLY
Qty: 90 TABLET | Refills: 1 | Status: SHIPPED | OUTPATIENT
Start: 2025-03-17 | End: 2025-09-13

## 2025-04-23 PROBLEM — L89.150 PRESSURE ULCER OF SACRAL REGION, UNSTAGEABLE (HCC): Status: RESOLVED | Noted: 2025-04-23 | Resolved: 2025-04-23

## 2025-04-26 ENCOUNTER — LAB ENCOUNTER (OUTPATIENT)
Dept: LAB | Age: 80
End: 2025-04-26
Attending: STUDENT IN AN ORGANIZED HEALTH CARE EDUCATION/TRAINING PROGRAM
Payer: MEDICARE

## 2025-04-26 DIAGNOSIS — E11.9 TYPE 2 DIABETES MELLITUS WITHOUT COMPLICATION, WITH LONG-TERM CURRENT USE OF INSULIN (HCC): ICD-10-CM

## 2025-04-26 DIAGNOSIS — E03.9 HYPOTHYROIDISM, UNSPECIFIED TYPE: ICD-10-CM

## 2025-04-26 DIAGNOSIS — E78.2 MIXED HYPERLIPIDEMIA: ICD-10-CM

## 2025-04-26 DIAGNOSIS — Z79.4 TYPE 2 DIABETES MELLITUS WITHOUT COMPLICATION, WITH LONG-TERM CURRENT USE OF INSULIN (HCC): ICD-10-CM

## 2025-04-26 LAB
ANION GAP SERPL CALC-SCNC: 11 MMOL/L (ref 0–18)
BUN BLD-MCNC: 8 MG/DL (ref 9–23)
CALCIUM BLD-MCNC: 8.8 MG/DL (ref 8.7–10.6)
CHLORIDE SERPL-SCNC: 105 MMOL/L (ref 98–112)
CHOLEST SERPL-MCNC: 142 MG/DL (ref ?–200)
CO2 SERPL-SCNC: 26 MMOL/L (ref 21–32)
CREAT BLD-MCNC: 0.83 MG/DL (ref 0.7–1.3)
EGFRCR SERPLBLD CKD-EPI 2021: 88 ML/MIN/1.73M2 (ref 60–?)
FASTING PATIENT LIPID ANSWER: YES
FASTING STATUS PATIENT QL REPORTED: YES
GLUCOSE BLD-MCNC: 137 MG/DL (ref 70–99)
HDLC SERPL-MCNC: 33 MG/DL (ref 40–59)
LDLC SERPL CALC-MCNC: 82 MG/DL (ref ?–100)
NONHDLC SERPL-MCNC: 109 MG/DL (ref ?–130)
OSMOLALITY SERPL CALC.SUM OF ELEC: 294 MOSM/KG (ref 275–295)
POTASSIUM SERPL-SCNC: 4.1 MMOL/L (ref 3.5–5.1)
SODIUM SERPL-SCNC: 142 MMOL/L (ref 136–145)
T4 FREE SERPL-MCNC: 1.2 NG/DL (ref 0.8–1.7)
TRIGL SERPL-MCNC: 151 MG/DL (ref 30–149)
TSI SER-ACNC: 3.67 UIU/ML (ref 0.55–4.78)
VLDLC SERPL CALC-MCNC: 24 MG/DL (ref 0–30)

## 2025-04-26 PROCEDURE — 36415 COLL VENOUS BLD VENIPUNCTURE: CPT

## 2025-04-26 PROCEDURE — 80061 LIPID PANEL: CPT

## 2025-04-26 PROCEDURE — 84443 ASSAY THYROID STIM HORMONE: CPT

## 2025-04-26 PROCEDURE — 80048 BASIC METABOLIC PNL TOTAL CA: CPT

## 2025-04-26 PROCEDURE — 84439 ASSAY OF FREE THYROXINE: CPT

## 2025-04-28 ENCOUNTER — HOSPITAL ENCOUNTER (INPATIENT)
Facility: HOSPITAL | Age: 80
LOS: 1 days | Discharge: HOME | End: 2025-04-29
Attending: EMERGENCY MEDICINE | Admitting: HOSPITALIST
Payer: MEDICARE

## 2025-04-28 ENCOUNTER — APPOINTMENT (OUTPATIENT)
Dept: CV DIAGNOSTICS | Facility: HOSPITAL | Age: 80
End: 2025-04-28
Attending: INTERNAL MEDICINE
Payer: MEDICARE

## 2025-04-28 ENCOUNTER — APPOINTMENT (OUTPATIENT)
Dept: GENERAL RADIOLOGY | Facility: HOSPITAL | Age: 80
End: 2025-04-28
Attending: EMERGENCY MEDICINE
Payer: MEDICARE

## 2025-04-28 ENCOUNTER — OFFICE VISIT (OUTPATIENT)
Facility: CLINIC | Age: 80
End: 2025-04-28
Payer: MEDICARE

## 2025-04-28 VITALS
BODY MASS INDEX: 32.14 KG/M2 | HEART RATE: 53 BPM | DIASTOLIC BLOOD PRESSURE: 40 MMHG | OXYGEN SATURATION: 99 % | WEIGHT: 200 LBS | SYSTOLIC BLOOD PRESSURE: 88 MMHG | HEIGHT: 66 IN | RESPIRATION RATE: 18 BRPM

## 2025-04-28 DIAGNOSIS — E11.9 TYPE 2 DIABETES MELLITUS WITHOUT COMPLICATION, WITH LONG-TERM CURRENT USE OF INSULIN (HCC): Primary | ICD-10-CM

## 2025-04-28 DIAGNOSIS — Z79.4 TYPE 2 DIABETES MELLITUS WITHOUT COMPLICATION, WITH LONG-TERM CURRENT USE OF INSULIN (HCC): ICD-10-CM

## 2025-04-28 DIAGNOSIS — E03.9 HYPOTHYROIDISM, UNSPECIFIED TYPE: ICD-10-CM

## 2025-04-28 DIAGNOSIS — E78.2 MIXED HYPERLIPIDEMIA: ICD-10-CM

## 2025-04-28 DIAGNOSIS — Z79.4 TYPE 2 DIABETES MELLITUS WITHOUT COMPLICATION, WITH LONG-TERM CURRENT USE OF INSULIN (HCC): Primary | ICD-10-CM

## 2025-04-28 DIAGNOSIS — E11.9 TYPE 2 DIABETES MELLITUS WITHOUT COMPLICATION, WITH LONG-TERM CURRENT USE OF INSULIN (HCC): ICD-10-CM

## 2025-04-28 DIAGNOSIS — R53.1 WEAKNESS: ICD-10-CM

## 2025-04-28 DIAGNOSIS — R79.89 ELEVATED TROPONIN: Primary | ICD-10-CM

## 2025-04-28 PROBLEM — I95.9 HYPOTENSION: Status: ACTIVE | Noted: 2025-04-28

## 2025-04-28 LAB
ALBUMIN SERPL-MCNC: 3.9 G/DL (ref 3.2–4.8)
ALBUMIN/GLOB SERPL: 1.6 {RATIO} (ref 1–2)
ALP LIVER SERPL-CCNC: 73 U/L (ref 45–117)
ALT SERPL-CCNC: 20 U/L (ref 10–49)
ANION GAP SERPL CALC-SCNC: 10 MMOL/L (ref 0–18)
AST SERPL-CCNC: 20 U/L (ref ?–34)
BASOPHILS # BLD AUTO: 0.04 X10(3) UL (ref 0–0.2)
BASOPHILS NFR BLD AUTO: 0.5 %
BILIRUB SERPL-MCNC: 0.5 MG/DL (ref 0.2–1.1)
BUN BLD-MCNC: 9 MG/DL (ref 9–23)
CALCIUM BLD-MCNC: 8.7 MG/DL (ref 8.7–10.6)
CHLORIDE SERPL-SCNC: 105 MMOL/L (ref 98–112)
CO2 SERPL-SCNC: 27 MMOL/L (ref 21–32)
CREAT BLD-MCNC: 1.06 MG/DL (ref 0.7–1.3)
D DIMER PPP FEU-MCNC: 0.39 UG/ML FEU (ref ?–0.8)
EGFRCR SERPLBLD CKD-EPI 2021: 71 ML/MIN/1.73M2 (ref 60–?)
EOSINOPHIL # BLD AUTO: 0.23 X10(3) UL (ref 0–0.7)
EOSINOPHIL NFR BLD AUTO: 2.6 %
ERYTHROCYTE [DISTWIDTH] IN BLOOD BY AUTOMATED COUNT: 15 %
GLOBULIN PLAS-MCNC: 2.5 G/DL (ref 2–3.5)
GLUCOSE BLD-MCNC: 105 MG/DL (ref 70–99)
GLUCOSE BLD-MCNC: 149 MG/DL (ref 70–99)
GLUCOSE BLD-MCNC: 167 MG/DL (ref 70–99)
GLUCOSE BLOOD: 157
HCT VFR BLD AUTO: 32.3 % (ref 39–53)
HEMOGLOBIN A1C: 7 % (ref 4.3–5.6)
HGB BLD-MCNC: 10.7 G/DL (ref 13–17.5)
IMM GRANULOCYTES # BLD AUTO: 0.07 X10(3) UL (ref 0–1)
IMM GRANULOCYTES NFR BLD: 0.8 %
LYMPHOCYTES # BLD AUTO: 1.23 X10(3) UL (ref 1–4)
LYMPHOCYTES NFR BLD AUTO: 14 %
MCH RBC QN AUTO: 32.7 PG (ref 26–34)
MCHC RBC AUTO-ENTMCNC: 33.1 G/DL (ref 31–37)
MCV RBC AUTO: 98.8 FL (ref 80–100)
MONOCYTES # BLD AUTO: 0.64 X10(3) UL (ref 0.1–1)
MONOCYTES NFR BLD AUTO: 7.3 %
NEUTROPHILS # BLD AUTO: 6.59 X10 (3) UL (ref 1.5–7.7)
NEUTROPHILS # BLD AUTO: 6.59 X10(3) UL (ref 1.5–7.7)
NEUTROPHILS NFR BLD AUTO: 74.8 %
OSMOLALITY SERPL CALC.SUM OF ELEC: 295 MOSM/KG (ref 275–295)
PLATELET # BLD AUTO: 212 10(3)UL (ref 150–450)
POTASSIUM SERPL-SCNC: 4 MMOL/L (ref 3.5–5.1)
PROT SERPL-MCNC: 6.4 G/DL (ref 5.7–8.2)
RBC # BLD AUTO: 3.27 X10(6)UL (ref 3.8–5.8)
SODIUM SERPL-SCNC: 142 MMOL/L (ref 136–145)
TEST STRIP LOT #: NORMAL NUMERIC
TROPONIN I SERPL HS-MCNC: 405 NG/L (ref ?–53)
TROPONIN I SERPL HS-MCNC: 515 NG/L (ref ?–53)
WBC # BLD AUTO: 8.8 X10(3) UL (ref 4–11)

## 2025-04-28 PROCEDURE — 71045 X-RAY EXAM CHEST 1 VIEW: CPT | Performed by: EMERGENCY MEDICINE

## 2025-04-28 PROCEDURE — 99223 1ST HOSP IP/OBS HIGH 75: CPT | Performed by: HOSPITALIST

## 2025-04-28 PROCEDURE — 93306 TTE W/DOPPLER COMPLETE: CPT | Performed by: INTERNAL MEDICINE

## 2025-04-28 RX ORDER — POLYETHYLENE GLYCOL 3350 17 G/17G
17 POWDER, FOR SOLUTION ORAL DAILY PRN
Status: DISCONTINUED | OUTPATIENT
Start: 2025-04-28 | End: 2025-04-29

## 2025-04-28 RX ORDER — ENOXAPARIN SODIUM 100 MG/ML
40 INJECTION SUBCUTANEOUS DAILY
Status: DISCONTINUED | OUTPATIENT
Start: 2025-04-28 | End: 2025-04-29

## 2025-04-28 RX ORDER — ONDANSETRON 2 MG/ML
4 INJECTION INTRAMUSCULAR; INTRAVENOUS EVERY 6 HOURS PRN
Status: DISCONTINUED | OUTPATIENT
Start: 2025-04-28 | End: 2025-04-29

## 2025-04-28 RX ORDER — NICOTINE POLACRILEX 4 MG
15 LOZENGE BUCCAL
Status: DISCONTINUED | OUTPATIENT
Start: 2025-04-28 | End: 2025-04-29

## 2025-04-28 RX ORDER — INSULIN DEGLUDEC 100 U/ML
55 INJECTION, SOLUTION SUBCUTANEOUS NIGHTLY
Status: DISCONTINUED | OUTPATIENT
Start: 2025-04-28 | End: 2025-04-29

## 2025-04-28 RX ORDER — SODIUM PHOSPHATE, DIBASIC AND SODIUM PHOSPHATE, MONOBASIC 7; 19 G/230ML; G/230ML
1 ENEMA RECTAL ONCE AS NEEDED
Status: DISCONTINUED | OUTPATIENT
Start: 2025-04-28 | End: 2025-04-29

## 2025-04-28 RX ORDER — BISACODYL 10 MG
10 SUPPOSITORY, RECTAL RECTAL
Status: DISCONTINUED | OUTPATIENT
Start: 2025-04-28 | End: 2025-04-29

## 2025-04-28 RX ORDER — ASPIRIN 81 MG/1
324 TABLET, CHEWABLE ORAL ONCE
Status: COMPLETED | OUTPATIENT
Start: 2025-04-28 | End: 2025-04-28

## 2025-04-28 RX ORDER — TAMSULOSIN HYDROCHLORIDE 0.4 MG/1
0.8 CAPSULE ORAL DAILY
Status: DISCONTINUED | OUTPATIENT
Start: 2025-04-29 | End: 2025-04-29

## 2025-04-28 RX ORDER — ASPIRIN 325 MG
325 TABLET ORAL DAILY
Status: DISCONTINUED | OUTPATIENT
Start: 2025-04-29 | End: 2025-04-29 | Stop reason: DRUGHIGH

## 2025-04-28 RX ORDER — ACETAMINOPHEN 500 MG
500 TABLET ORAL EVERY 4 HOURS PRN
Status: DISCONTINUED | OUTPATIENT
Start: 2025-04-28 | End: 2025-04-29

## 2025-04-28 RX ORDER — ASPIRIN 81 MG/1
81 TABLET ORAL DAILY
Status: CANCELLED | OUTPATIENT
Start: 2025-04-28

## 2025-04-28 RX ORDER — SENNOSIDES 8.6 MG
17.2 TABLET ORAL NIGHTLY PRN
Status: DISCONTINUED | OUTPATIENT
Start: 2025-04-28 | End: 2025-04-29

## 2025-04-28 RX ORDER — ATORVASTATIN CALCIUM 40 MG/1
40 TABLET, FILM COATED ORAL NIGHTLY
Status: DISCONTINUED | OUTPATIENT
Start: 2025-04-28 | End: 2025-04-29

## 2025-04-28 RX ORDER — NICOTINE POLACRILEX 4 MG
30 LOZENGE BUCCAL
Status: DISCONTINUED | OUTPATIENT
Start: 2025-04-28 | End: 2025-04-29

## 2025-04-28 RX ORDER — PREGABALIN 75 MG/1
75 CAPSULE ORAL 2 TIMES DAILY
Status: DISCONTINUED | OUTPATIENT
Start: 2025-04-28 | End: 2025-04-29

## 2025-04-28 RX ORDER — LEVOTHYROXINE SODIUM 75 UG/1
75 TABLET ORAL
Status: DISCONTINUED | OUTPATIENT
Start: 2025-04-29 | End: 2025-04-29

## 2025-04-28 RX ORDER — DEXTROSE MONOHYDRATE 25 G/50ML
50 INJECTION, SOLUTION INTRAVENOUS
Status: DISCONTINUED | OUTPATIENT
Start: 2025-04-28 | End: 2025-04-29

## 2025-04-28 RX ORDER — METOCLOPRAMIDE HYDROCHLORIDE 5 MG/ML
5 INJECTION INTRAMUSCULAR; INTRAVENOUS EVERY 8 HOURS PRN
Status: DISCONTINUED | OUTPATIENT
Start: 2025-04-28 | End: 2025-04-29

## 2025-04-28 NOTE — ED PROVIDER NOTES
Patient Seen in: [facility] Emergency Department      History     Chief Complaint   Patient presents with    Hypotension    Fatigue     Stated Complaint: Hypotension, weakness    Subjective:   HPI    Patient here for generalized weakness.    Recently traveled back from [place].  Woke up this morning initially felt fine.    Noticed, after taking his blood pressure medication that he felt very weak on his feet as if he could fall.  Did not actually fall.    Was noted to be hypotensive when he went to the diabetic clinic and was sent to the ER for further evaluation.  No chest pain no shortness of breath    No history of VTE    Follows with [facility]    History of Present Illness               Objective:     Past Medical History:    Atherosclerosis of coronary artery    Heart disease    Hyperthyroidism    Other and unspecified hyperlipidemia    Type II or unspecified type diabetes mellitus without mention of complication, not stated as uncontrolled    Unspecified essential hypertension    Vertigo              Past Surgical History:   Procedure Laterality Date    Cabg  1996    4x bypass    Cataract  Ten yrs approx    Bilateral    Colonoscopy  Not sure    Hemorrhoidectomy  Not sure    Sinus surgery                    No pertinent social history.                              Physical Exam     ED Triage Vitals   BP 04/28/25 1146 108/46   Pulse 04/28/25 1146 60   Resp 04/28/25 1146 18   Temp 04/28/25 1148 97.4 °F (36.3 °C)   Temp src 04/28/25 1148 Temporal   SpO2 04/28/25 1146 100 %   O2 Device 04/28/25 1146 None (Room air)       Current Vitals:   Vital Signs  BP: 117/49  Pulse: 52  Resp: 16  Temp: 97.4 °F (36.3 °C)  Temp src: Temporal  MAP (mmHg): 68    Oxygen Therapy  SpO2: 100 %  O2 Device: None (Room air)        Physical Exam  Physical Exam   Constitutional: Awake, alert, well appearing  Head: Normocephalic and atraumatic.   Eyes: Conjunctivae are normal. Pupils are equal, round, and reactive to light.   Neck: Normal  range of motion. No JVD  Cardiovascular: Normal rate, regular rhythm  Pulmonary/Chest: Normal effort.  No accessory muscle use.  No cyanosis.  Abdominal: Soft. Not distended.  Neurological: Pt is alert and oriented to person, place, and time. no cranial nerve deficits. Speech fluent      No calf tenderness, 1-2+ lower EXTR edema just past the ankles    Belly benign    Regular rate and rhythm no murmurs    Physical Exam                ED Course     Labs Reviewed   CBC WITH DIFFERENTIAL WITH PLATELET - Abnormal; Notable for the following components:       Result Value    RBC 3.27 (*)     HGB 10.7 (*)     HCT 32.3 (*)     All other components within normal limits   COMP METABOLIC PANEL (14) - Abnormal; Notable for the following components:    Glucose 149 (*)     All other components within normal limits   TROPONIN I HIGH SENSITIVITY - Abnormal; Notable for the following components:    Troponin I (High Sensitivity) 515 (*)     All other components within normal limits   D-DIMER - Normal   RAINBOW DRAW BLUE     EKG    Rate, intervals and axes as noted on EKG Report.  Rate: 57  Rhythm: Sinus Rhythm  Reading: Sinus rhythm subtle ST changes in the inferior and lateral precordial leads.              Results            Blood work remarkable for elevated troponin, dimer negative basic labs okay.                     MDM          Differential diagnoses considered: Dehydration, disturbance, PE, atypical presentation of life-threatening ACS considered    -Elevated troponin of unclear significance.  Patient has not been having anginal equivalents, echo done out of [facility] late last year was pretty normal.    -Will need admission for close observation and an likely further diagnostic tests    -[facility] APN made aware  Patient will be admitted primarily to the [facility] hospitalist.  Care has been discussed with the admitting physician.        *Discussion of ongoing management of this patient's care included:  [facility], admitting physician    Shared  decision making was done by: patient, myself.          Medical Decision Making      Disposition and Plan     Clinical Impression:  No diagnosis found.     Disposition:  There is no disposition on file for this visit.  There is no disposition time on file for this visit.    Follow-up:  No follow-up provider specified.        Medications Prescribed:  Current Discharge Medication List          Supplementary Documentation:

## 2025-04-28 NOTE — ED QUICK NOTES
Orders for admission, patient is aware of plan and ready to go upstairs. Any questions, please call ED RN Alberto at extension [phone].     Patient Covid vaccination status: Fully vaccinated     COVID Test Ordered in ED: None    COVID Suspicion at Admission: N/A    Running Infusions: Medication Infusions[1]     Mental Status/LOC at time of transport: x4    Other pertinent information:   CIWA score: N/A   NIH score:  N/A             [1]

## 2025-04-28 NOTE — H&P
[facility]  [facility]     Ida Burgess Patient Status:  Emergency    1945 MRN [id number]   [facility] EMERGENCY DEPARTMENT Attending Melissa Yousif MD   Hosp Day # 0 PCP Luis Miguel Gomez MD     Chief Complaint: Hypotension/bradycardia    Subjective:    History of Present Illness:     Ida Burgess is a 79 year old male with a PMH of CAD with prior CABG, BPH, DMII, and hypothyroidism who presented to ED from endocrinologist office due to hypotension/bradycardia. Patient began feeling a bit unwell after taking medications this morning. Patient unsure but may have inadvertently taken extra atenolol. Upon arrival to endocrinologist office, patient requested wheelchair due to assistance and while there was found to be hypotensive and bradycardic. Patient denies chest pain or palpitations. Denies N/V/D. Patient was having bloody stools over the past week which he attributes to hemorrhoids but last 2 bowel movements have been without blood. Patient also reports leg swelling. Patient was recently in [place] for 4 weeks and returned . Patient reports he was not taking diuretics there due to difficulty findings places to urinate when out and was overall less complaint with usual medications.     History/Other:    Past Medical History:  Past Medical History[1]  Past Surgical History:   Past Surgical History[2]   Family History:   Family History[3]  Social History:    reports that he has never smoked. He has never been exposed to tobacco smoke. He has never used smokeless tobacco. He reports that he does not drink alcohol and does not use drugs.     Allergies: Allergies[4]    Medications:  Medications Ordered Prior to Encounter[5]    Review of Systems:   A comprehensive review of systems was completed.    Pertinent positives and negatives noted in the HPI.    Objective:   Physical Exam:    /49   Pulse 52   Temp 97.4 °F (36.3 °C) (Temporal)   Resp 16   SpO2 100%   General: No  acute distress, Alert  Respiratory: No rhonchi, no wheezes  Cardiovascular: S1, S2. Bradycardic.   Abdomen: Soft, Non-tender, non-distended, positive bowel sounds  Neuro: No new focal deficits  Extremities: 2+ B/L JACOB.     Results:    Labs:      Labs Last 24 Hours:    Recent Labs   Lab 04/28/25  1157   RBC 3.27*   HGB 10.7*   HCT 32.3*   MCV 98.8   MCH 32.7   MCHC 33.1   RDW 15.0   NEPRELIM 6.59   WBC 8.8   .0       Recent Labs   Lab 04/26/25  0958 04/28/25  1157   * 149*   BUN 8* 9   CREATSERUM 0.83 1.06   EGFRCR 88 71   CA 8.8 8.7   ALB  --  3.9    142   K 4.1 4.0    105   CO2 26.0 27.0   ALKPHO  --  73   AST  --  20   ALT  --  20   BILT  --  0.5   TP  --  6.4       Estimated Glomerular Filtration Rate: 71 mL/min/1.73m2 (result from lab).    No results found for: \"PT\", \"INR\"    Recent Labs   Lab 04/28/25  1157   TROPHS 515*       No results for input(s): \"TROP\", \"PBNP\" in the last 168 hours.    No results for input(s): \"PCT\" in the last 168 hours.    Imaging: Imaging data reviewed in Epic.    Assessment & Plan:      #Hypotension  #Bradycardia  -Possibly due to inadvertent extra dose of BB  -Hold BB/ARB/nitrate/lasix for now  -BP improved with IVF - limit due to peripheral edema   -Cardiology consulted     #Elevated troponin  #CAD with prior CABG  -ASA  -Trend enzymes  -Tele  -Echo 10/24 reviewed  -Cardiology consulted    #Peripheral edema  -Likely due to non-compliance with diuretics though given recent long distance travel will check venous dopplers  -Monitor volume status with fluids     #DMII  -Tresiba/SSI    #Hypothyroidism  -Synthroid     #BPH  -Flomax     #Obesity  -Body mass index is 30.25 kg/m².    Plan of care discussed with patient and ED physician.     Galileo Cox DO    Supplementary Documentation:     The 21st Century Cures Act makes medical notes like these available to patients in the interest of transparency. Please be advised this is a medical document. Medical documents  are intended to carry relevant information, facts as evident, and the clinical opinion of the practitioner. The medical note is intended as peer to peer communication and may appear blunt or direct. It is written in medical language and may contain abbreviations or verbiage that are unfamiliar.                                       [1]   Past Medical History:   Atherosclerosis of coronary artery    Heart disease    Hyperthyroidism    Other and unspecified hyperlipidemia    Type II or unspecified type diabetes mellitus without mention of complication, not stated as uncontrolled    Unspecified essential hypertension    Vertigo   [2]   Past Surgical History:  Procedure Laterality Date    Cabg  1996    4x bypass    Cataract  Ten yrs approx    Bilateral    Colonoscopy  Not sure    Hemorrhoidectomy  Not sure    Sinus surgery       [3]   Family History  Problem Relation Age of Onset    Diabetes Mother     Diabetes Father     Hypertension Paternal Grandmother         Sisters    Heart Disease Neg     High Blood Pressure Neg    [4]   Allergies  Allergen Reactions    Bromocriptine ANAPHYLAXIS and OTHER (SEE COMMENTS)    Clindamycin HIVES    Other OTHER (SEE COMMENTS)     Cycloset   [5]   No current facility-administered medications on file prior to encounter.     Current Outpatient Medications on File Prior to Encounter   Medication Sig Dispense Refill    ATORVASTATIN 40 MG Oral Tab Take 1 tablet (40 mg total) by mouth nightly. 90 tablet 1    Irbesartan 150 MG Oral Tab Take 1 tablet (150 mg total) by mouth daily. 90 tablet 0    atenolol 25 MG Oral Tab Take 1 tablet (25 mg total) by mouth daily. 90 tablet 3    Potassium Chloride ER 10 MEQ Oral Tab CR Take 1 tablet (10 mEq total) by mouth 2 (two) times daily. 180 tablet 0    pregabalin (LYRICA) 75 MG Oral Cap Take 1 capsule (75 mg total) by mouth 2 (two) times daily. 180 capsule 0    FUROSEMIDE 20 MG Oral Tab TAKE ONE TABLET BY MOUTH ONCE DAILY 90 tablet 0    Insulin Glulisine  (APIDRA SOLOSTAR) 100 UNIT/ML Subcutaneous Solution Pen-injector Take 18 u with meals plus scale. Max TDD: 70 units 63 mL 1    Glucose Blood (ACCU-CHEK GUIDE TEST) In Vitro Strip Test glucose two times a day 100 each 3    metFORMIN 500 MG Oral Tab Take 2 tablets (1,000 mg total) by mouth 2 (two) times daily with meals. 360 tablet 1    empagliflozin (JARDIANCE) 25 MG Oral Tab Take 1 tablet (25 mg total) by mouth daily. 90 tablet 0    ISOSORBIDE MONONITRATE ER 30 MG Oral Tablet 24 Hr Take 1 tablet (30 mg total) by mouth daily. 90 tablet 0    JARDIANCE 25 MG Oral Tab TAKE ONE TABLET BY MOUTH ONE TIME DAILY 30 tablet 0    LANTUS SOLOSTAR 100 UNIT/ML Subcutaneous Solution Pen-injector INJECT 65 UNITS            SUBCUTANEOUSLY NIGHTLY 60 mL 1    KLOR-CON M10 10 MEQ Oral Tab CR Take 1 tablet (10 mEq total) by mouth 2 (two) times daily.      Niacin ER, Antihyperlipidemic, 500 MG Oral Tab CR TAKE 1 TABLET NIGHTLY 90 tablet 0    levothyroxine 75 MCG Oral Tab Take 1 tablet (75 mcg total) by mouth before breakfast. 90 tablet 0    TAMSULOSIN 0.4 MG Oral Cap TAKE 2 CAPSULES DAILY 180 capsule 1    Glucose Blood (ACCU-CHEK GUIDE) In Vitro Strip Test 4-5 times per day.  Diagnoses: E11.649 and Z79.4 400 strip 0    Blood Glucose Monitoring Suppl (ACCU-CHEK GUIDE) w/Device Does not apply Kit 1 Device  in the morning and 1 Device before bedtime. 1 kit 0    Accu-Chek FastClix Lancets Does not apply Misc 1 Lancet  in the morning and 1 Lancet before bedtime. Test glucose twice daily. 100 each 5    aspirin 81 MG Oral Tab EC Take 1 tablet (81 mg total) by mouth daily.      Blood Glucose Monitoring Suppl (ACCU-CHEK GUIDE) w/Device Does not apply Kit 1 kit by Does not apply route.      Insulin Pen Needle 31G X 8 MM Does not apply Misc 4/day      Lancets Misc. (ACCU-CHEK FASTCLIX LANCET) Does not apply Kit       Ferrous Sulfate 325 (65 FE) MG Oral Tab Take 1 tablet (325 mg total) by mouth daily with breakfast.

## 2025-04-28 NOTE — CONSULTS
[facility] Cardiology  Report of Consultation    Ida Burgess Patient Status:  Emergency    1945 MRN [id number]   [facility] EMERGENCY DEPARTMENT Attending Melissa Yousif MD   Hosp Day # 0 PCP Luis Miguel Gomez MD     Reason for Consultation:  Elevated troponin    History of Present Illness:  Ida Burgess is a a(n) 79 year old male with history of coronary artery bypass grafting surgery, hypertension, hyperlipidemia who presents to the ER with weakness.  Patient traveled recently.  He came home and went to go see his endocrinologist.  While driving there, he felt that he was weak.  He did not have any specific sensation of dizziness.  He had no syncope or presyncope.  He had no palpitations.  No chest pain.    While there, he was felt to be bradycardic and hypotensive.  Blood pressure as best as I can tell was 108.  On arrival here, patient has no complaints.    Only thing he has noted differently of these had increased rectal bleeding from his hemorrhoids.  He had a good time while he was traveling.  He had no illness other than a dental procedure in [place] about 2 weeks ago.    He has had no fever chills or cough.    Routine labs in the emergency department revealed a troponin of 500.  He has had no recent cardiac symptoms whatsoever.    History:  Past Medical History[1]  Past Surgical History[2]  Family History[3]   reports that he has never smoked. He has never been exposed to tobacco smoke. He has never used smokeless tobacco. He reports that he does not drink alcohol and does not use drugs.    Allergies:  Allergies[4]    Medications:  Current Hospital Medications[5]    Review of Systems:  A comprehensive 10 point review of systems was completed.  Pertinent positives and negatives noted in the the HPI.     Physical Exam:  Blood pressure 117/49, pulse 52, temperature 97.4 °F (36.3 °C), temperature source Temporal, resp. rate 16, SpO2 100%.  Temp (24hrs), Av.4 °F (36.3 °C), Min:97.4 °F (36.3 °C),  Max:97.4 °F (36.3 °C)    Wt Readings from Last 3 Encounters:   04/28/25 200 lb (90.7 kg)   11/05/24 200 lb (90.7 kg)   10/25/24 206 lb (93.4 kg)       Telemetry: Sinus  General: Alert and oriented in no apparent distress.  HEENT: EOMI, no scleral icterus, mucosa moist  Neck: Supple, carotids 2+   Cardiac: Regular rate and rhythm   Lungs: Clear   Abdomen: Soft, non-tender.   Extremities: Without clubbing, cyanosis; + edema  Neurologic: Alert and oriented, normal affect.  Skin: Warm and dry.     Laboratories and Data:  Diagnostics:    Labs:   No results for input(s): \"TROP\", \"CK\" in the last 168 hours.   Recent Labs   Lab 04/28/25  1157   RBC 3.27*   HGB 10.7*   HCT 32.3*   MCV 98.8   MCH 32.7   MCHC 33.1   RDW 15.0   NEPRELIM 6.59   WBC 8.8   .0     Recent Labs   Lab 04/26/25  0958 04/28/25  1157   * 149*   BUN 8* 9   CREATSERUM 0.83 1.06   CA 8.8 8.7   ALB  --  3.9    142   K 4.1 4.0    105   CO2 26.0 27.0   ALKPHO  --  73   AST  --  20   ALT  --  20   BILT  --  0.5   TP  --  6.4      No results found for: \"PT\", \"INR\"     Assessment/Plan:    CV -patient is here with weakness and incidentally noted to have an elevated troponin.  He denies any cardiac symptomatology.  Has been doing well.  EKG reveals nonspecific changes.  Blood pressures here have not been significantly hypotensive.  He apparently was bradycardic but the lowest documented pulse I see before he came to the emergency department was 60.  Elevation of his troponin is of unclear etiology.  I do not know if this represents silent ischemia although he looks really well compensated and does not appear ill at all.  At the moment, we will trend out his troponins.  If there is a significant rise in his troponins, he will likely need angiography.  If his troponins are flat, ischemic testing is a consideration.  For now, we will continue with low-dose of atenolol baby aspirin irbesartan and isosorbide.  We will hold his potassium  today.  Fatigue -etiology unclear.  Echo is pending  History diabetes mellitus  Dyslipidemia -continue statin  Anemia -hemoglobin today is 10.7.  His baseline is closer to 12.  This will need to be trended.    Rickey Butts MD  4/28/2025  1:59 PM  C5         [1]   Past Medical History:   Atherosclerosis of coronary artery    Heart disease    Hyperthyroidism    Other and unspecified hyperlipidemia    Type II or unspecified type diabetes mellitus without mention of complication, not stated as uncontrolled    Unspecified essential hypertension    Vertigo   [2]   Past Surgical History:  Procedure Laterality Date    Cabg  1996    4x bypass    Cataract  Ten yrs approx    Bilateral    Colonoscopy  Not sure    Hemorrhoidectomy  Not sure    Sinus surgery       [3]   Family History  Problem Relation Age of Onset    Diabetes Mother     Diabetes Father     Hypertension Paternal Grandmother         Sisters    Heart Disease Neg     High Blood Pressure Neg    [4]   Allergies  Allergen Reactions    Bromocriptine ANAPHYLAXIS and OTHER (SEE COMMENTS)    Clindamycin HIVES    Other OTHER (SEE COMMENTS)     Cycloset   [5] No current facility-administered medications for this encounter.

## 2025-04-28 NOTE — PROGRESS NOTES
Patient admitted from ER. Patient laying in bed, denies chest pain, shortness of breath and dizziness. Patient alert and oriented on RA. Patient with bilateral hearing aids and still hard of hearing. Plan for Echo and BLE US. Plan reviewed with patient, verbalized understanding. Call light with in reach, fall precautions reviewed, all questions answered.

## 2025-04-28 NOTE — ED INITIAL ASSESSMENT (HPI)
PT here with c/o weakness that began this morning. Was seen at diabetic clinic earlier today and was told his blood pressure was low, does not recall the number. Pt states it is possible he doubled up on his BP meds this morning by mistake.

## 2025-04-28 NOTE — PROGRESS NOTES
[facility] Note    Name: Ida Burgess    Date: 2025    Ida Burgess is a 79 year old male who presents for evaluation of T2DM management.     Chief complaint: Follow - Up (Pt here for f/u for diabetes, pt finger sticks 1x daily,no other concerns, pt was feeeling weak took sugar it was 157  /A1C-7.0/Last A1C-2025 7.4/Last Foot exam- 2024/Last Eye exam- 2024 due )       Subjective:    Initial HPI consult in 2023  Initially started on oral medications and then eventually added on insulin     DM hx:  -Diagnosed with diabetes in   -Family history-  not known   -Re: potential DM medication contraindication (if positive, checkbox selected):  [] history of pancreatitis  [] personal/fam hx of medullary thyroid ca/MEN2  [x] UTI/yeast infxn    -Presence of associated DM complications (if positive, checkbox selected):  [x] Macrovascular complications (CAD/CVA/PAD)  [] Neuropathy  [] Retinopathy  [x] Nephropathy  [x] HTN  [x] Hyperlipidemia  [] Stroke/TIA  [] Gastroparesis       Interval Hx 2024  Saw [facility]rickson 2024  - Increase Lantus to 48 u daily 2024 > 52 units but takes 68-70 units     Current Regimen for diabetes:   Oral/Injectable medications: Jardiance 25mg, Metformin 1000mg BID   Basal:  Lantus 68-70 units qAM . Injects in abdomen  Prandial:  Apidra 18u with each meal and takes it prior to meal . Also on a 1:20 above 120 correction with a reverse scale if premeal glucose below 100. Injects in abdomen  Missed doses: very rare     2024  Noted symmptoms when he was low and alerted our office  Current regimen: Continue: Jardiance 25mg, Metformin 1000mg BID   Decrease: Lantus to 48 units daily  Novolog 18u with each meal and take it prior to meal .   Continue: 1:20 above 120 correction with a reverse scale if premeal glucose below    Blood Glucose log reviewed. Checking 3 times per day. Morning/fastin-170, episodes of hypoglycemia: Yes69  5p    Previously trialed/failed  DM meds:   -He stopped ozempic, bydureon and rybelsus due to GI upset  -He stopped actos due to concern about bladder cancer     10/15/2024  8/2024 TSH 3.8, free T41.4, calcium 9.8, creatinine 0.86, GFR 88, glucose 119  Was diagnosed with a sacral wound since August 2024, follows with wound care   Current regimen: Jardiance 25 mg daily, metformin 1000 mg twice daily, Lantus 54 units at night, NovoLog 18 units 3 times daily with meals, ISF 1-20 above 120  Did miss around a week of medication when he was in [place]; continued on Metformin during that time  Last A1c value was 7.2% done 10/15/2024.     1/16/2025  Current regimen: Jardiance 25 mg daily, metformin 1000 mg twice daily, Lantus 54 units daily, NovoLog 18 units 3 times daily with meals, ISF 1-20 above 120 (rarely uses it)   Fasting   Last A1c value was 7.4% done 1/16/2025 04/28/25  Labs: 4/26/2025 TSH 3.6, free T41.2, LDL 82, glucose 137, creatinine 0.83  He continues to finger stick 1x daily, pt was feeeling weak took sugar it was 157   Follow-up with [facility], last office visit 4/25/2025 for diabetic retinopathy   Notes glucose has ranged 112-150 at home  He recently came back from [place] and did note he underwent a tooth extraction as well as some rectal bleeding thought to be due to hemorrhoids.  He was in his usual state of health this morning until around an hour before this appointment.  Patient drove here today and when getting out of car noticed profound weakness.  Vitals here with low blood pressure readings as well as low heart rate.  Patient overall does not feel well    History/Other:    Allergies, PMH, SocHx and FHx reviewed and updated as appropriate in Epic on    ATORVASTATIN 40 MG Oral Tab Take 1 tablet (40 mg total) by mouth nightly. 90 tablet 1    Irbesartan 150 MG Oral Tab Take 1 tablet (150 mg total) by mouth daily. 90 tablet 0    atenolol 25 MG Oral Tab Take 1 tablet (25 mg total) by mouth daily. 90 tablet 3    Potassium  Chloride ER 10 MEQ Oral Tab CR Take 1 tablet (10 mEq total) by mouth 2 (two) times daily. 180 tablet 0    pregabalin (LYRICA) 75 MG Oral Cap Take 1 capsule (75 mg total) by mouth 2 (two) times daily. 180 capsule 0    FUROSEMIDE 20 MG Oral Tab TAKE ONE TABLET BY MOUTH ONCE DAILY 90 tablet 0    Insulin Glulisine (APIDRA SOLOSTAR) 100 UNIT/ML Subcutaneous Solution Pen-injector Take 18 u with meals plus scale. Max TDD: 70 units 63 mL 1    Glucose Blood (ACCU-CHEK GUIDE TEST) In Vitro Strip Test glucose two times a day 100 each 3    metFORMIN 500 MG Oral Tab Take 2 tablets (1,000 mg total) by mouth 2 (two) times daily with meals. 360 tablet 1    empagliflozin (JARDIANCE) 25 MG Oral Tab Take 1 tablet (25 mg total) by mouth daily. 90 tablet 0    ISOSORBIDE MONONITRATE ER 30 MG Oral Tablet 24 Hr Take 1 tablet (30 mg total) by mouth daily. 90 tablet 0    JARDIANCE 25 MG Oral Tab TAKE ONE TABLET BY MOUTH ONE TIME DAILY 30 tablet 0    LANTUS SOLOSTAR 100 UNIT/ML Subcutaneous Solution Pen-injector INJECT 65 UNITS            SUBCUTANEOUSLY NIGHTLY 60 mL 1    KLOR-CON M10 10 MEQ Oral Tab CR Take 1 tablet (10 mEq total) by mouth 2 (two) times daily.      Niacin ER, Antihyperlipidemic, 500 MG Oral Tab CR TAKE 1 TABLET NIGHTLY 90 tablet 0    levothyroxine 75 MCG Oral Tab Take 1 tablet (75 mcg total) by mouth before breakfast. 90 tablet 0    TAMSULOSIN 0.4 MG Oral Cap TAKE 2 CAPSULES DAILY 180 capsule 1    Glucose Blood (ACCU-CHEK GUIDE) In Vitro Strip Test 4-5 times per day.  Diagnoses: E11.649 and Z79.4 400 strip 0    Blood Glucose Monitoring Suppl (ACCU-CHEK GUIDE) w/Device Does not apply Kit 1 Device  in the morning and 1 Device before bedtime. 1 kit 0    Accu-Chek FastClix Lancets Does not apply Misc 1 Lancet  in the morning and 1 Lancet before bedtime. Test glucose twice daily. 100 each 5    aspirin 81 MG Oral Tab EC Take 1 tablet (81 mg total) by mouth daily.      Blood Glucose Monitoring Suppl (ACCU-CHEK GUIDE) w/Device Does  not apply Kit 1 kit by Does not apply route.      Insulin Pen Needle 31G X 8 MM Does not apply Misc 4/day      Lancets Misc. (ACCU-CHEK FASTCLIX LANCET) Does not apply Kit       Ferrous Sulfate 325 (65 FE) MG Oral Tab Take 1 tablet (325 mg total) by mouth daily with breakfast.       Allergies   Allergen Reactions    Bromocriptine ANAPHYLAXIS and OTHER (SEE COMMENTS)    Clindamycin HIVES    Other OTHER (SEE COMMENTS)     Cycloset     Current Outpatient Medications   Medication Sig Dispense Refill    ATORVASTATIN 40 MG Oral Tab Take 1 tablet (40 mg total) by mouth nightly. 90 tablet 1    Irbesartan 150 MG Oral Tab Take 1 tablet (150 mg total) by mouth daily. 90 tablet 0    atenolol 25 MG Oral Tab Take 1 tablet (25 mg total) by mouth daily. 90 tablet 3    Potassium Chloride ER 10 MEQ Oral Tab CR Take 1 tablet (10 mEq total) by mouth 2 (two) times daily. 180 tablet 0    pregabalin (LYRICA) 75 MG Oral Cap Take 1 capsule (75 mg total) by mouth 2 (two) times daily. 180 capsule 0    FUROSEMIDE 20 MG Oral Tab TAKE ONE TABLET BY MOUTH ONCE DAILY 90 tablet 0    Insulin Glulisine (APIDRA SOLOSTAR) 100 UNIT/ML Subcutaneous Solution Pen-injector Take 18 u with meals plus scale. Max TDD: 70 units 63 mL 1    Glucose Blood (ACCU-CHEK GUIDE TEST) In Vitro Strip Test glucose two times a day 100 each 3    metFORMIN 500 MG Oral Tab Take 2 tablets (1,000 mg total) by mouth 2 (two) times daily with meals. 360 tablet 1    empagliflozin (JARDIANCE) 25 MG Oral Tab Take 1 tablet (25 mg total) by mouth daily. 90 tablet 0    ISOSORBIDE MONONITRATE ER 30 MG Oral Tablet 24 Hr Take 1 tablet (30 mg total) by mouth daily. 90 tablet 0    JARDIANCE 25 MG Oral Tab TAKE ONE TABLET BY MOUTH ONE TIME DAILY 30 tablet 0    LANTUS SOLOSTAR 100 UNIT/ML Subcutaneous Solution Pen-injector INJECT 65 UNITS            SUBCUTANEOUSLY NIGHTLY 60 mL 1    KLOR-CON M10 10 MEQ Oral Tab CR Take 1 tablet (10 mEq total) by mouth 2 (two) times daily.      Niacin ER,  Antihyperlipidemic, 500 MG Oral Tab CR TAKE 1 TABLET NIGHTLY 90 tablet 0    levothyroxine 75 MCG Oral Tab Take 1 tablet (75 mcg total) by mouth before breakfast. 90 tablet 0    TAMSULOSIN 0.4 MG Oral Cap TAKE 2 CAPSULES DAILY 180 capsule 1    Glucose Blood (ACCU-CHEK GUIDE) In Vitro Strip Test 4-5 times per day.  Diagnoses: E11.649 and Z79.4 400 strip 0    Blood Glucose Monitoring Suppl (ACCU-CHEK GUIDE) w/Device Does not apply Kit 1 Device  in the morning and 1 Device before bedtime. 1 kit 0    Accu-Chek FastClix Lancets Does not apply Misc 1 Lancet  in the morning and 1 Lancet before bedtime. Test glucose twice daily. 100 each 5    aspirin 81 MG Oral Tab EC Take 1 tablet (81 mg total) by mouth daily.      Blood Glucose Monitoring Suppl (ACCU-CHEK GUIDE) w/Device Does not apply Kit 1 kit by Does not apply route.      Insulin Pen Needle 31G X 8 MM Does not apply Misc 4/day      Lancets Misc. (ACCU-CHEK FASTCLIX LANCET) Does not apply Kit       Ferrous Sulfate 325 (65 FE) MG Oral Tab Take 1 tablet (325 mg total) by mouth daily with breakfast.       Past Medical History:    Atherosclerosis of coronary artery    Heart disease    Hyperthyroidism    Other and unspecified hyperlipidemia    Type II or unspecified type diabetes mellitus without mention of complication, not stated as uncontrolled    Unspecified essential hypertension    Vertigo     Past Surgical History:   Procedure Laterality Date    Cabg  1996    4x bypass    Cataract  Ten yrs approx    Bilateral    Colonoscopy  Not sure    Hemorrhoidectomy  Not sure    Sinus surgery         Social History     Socioeconomic History    Marital status:    Tobacco Use    Smoking status: Never     Passive exposure: Never    Smokeless tobacco: Never   Vaping Use    Vaping status: Never Used   Substance and Sexual Activity    Alcohol use: Never    Drug use: Never   Other Topics Concern    Caffeine Concern No    Exercise No    Seat Belt No    Special Diet No    Stress  Concern No    Weight Concern No     Family History   Problem Relation Age of Onset    Diabetes Mother     Diabetes Father     Hypertension Paternal Grandmother         Sisters    Heart Disease Neg     High Blood Pressure Neg        ROS/Exam    REVIEW OF SYSTEMS: Ten point review of systems has been performed and is otherwise negative and/or non-contributory, except as described above.     VITALS  Vitals:    04/28/25 1055   BP: 108/52   Pulse: 53   Resp: 18   SpO2: 99%   Weight: 200 lb (90.7 kg)   Height: 5' 6\" (1.676 m)       Wt Readings from Last 6 Encounters:   04/28/25 200 lb (90.7 kg)   11/05/24 200 lb (90.7 kg)   10/25/24 206 lb (93.4 kg)   10/15/24 200 lb (90.7 kg)   10/03/24 200 lb (90.7 kg)   09/12/24 202 lb (91.6 kg)       PHYSICAL EXAM  CONSTITUTIONAL:  awake, cooperative, mild distress, and appears stated age  PSYCH: normal affect  LUNGS: breathing comfortably  CARDIOVASCULAR:  regular rate   ENT:  no thyromegaly          Labs/Imaging: Pertinent imaging reviewed.    Overall glucose control:  Lab Results   Component Value Date    A1C 7.0 (A) 04/28/2025    A1C 7.4 (A) 01/16/2025    A1C 7.2 (A) 10/15/2024    A1C 7.4 (A) 07/11/2024    A1C 7.5 (A) 04/04/2024   Thyroid:    Lab Results   Component Value Date    TSH 3.669 04/26/2025    TSH 3.888 08/30/2024    TSH 3.016 03/26/2024    T4F 1.2 04/26/2025    T4F 1.4 08/30/2024    T4F 1.3 03/26/2024        Assessment & Plan:     ICD-10-CM    1. Type 2 diabetes mellitus without complication, with long-term current use of insulin ([facility])  E11.9 POC Hemoglobin A1C    Z79.4 POC HemoCue Glucose 201 (Finger stick glucose)      2. Hypothyroidism, unspecified type  E03.9       3. Mixed hyperlipidemia  E78.2             Ida Burgess is a pleasant 79 year old male here for evaluation of:    #Diabetes- PMHx of Type 2 diabetes mellitus diagnosed in 1986.        Last A1c value was 7% done 4/28/2025.  Goal <7%. Importance of better glucose control in preventing onset/progression of  end-organ damage discussed, as well as goals of therapy and clinical significance of A1C.     - med changes: -Continue Lantus 54 units daily with Novolog 18 u with meals and correction scale  -Continue metformin and Jardiance  - Patient to inform us if glucose is consistently greater than 250 or less than 70  - Continue checking glucose with glucometer    -Surveillance for Diabetes Complications & Risks  Foot exam/neuropathy: Last Foot Exam: 07/11/24  Retinopathy screening: Last Dilated Eye Exam: 04/25/25    No data recorded  CKD/Nephropathy screening:   Lab Results   Component Value Date    EGFRCR 88 04/26/2025    MICROALBCREA 147.2 (H) 03/26/2024      Blood pressure control:   BP Readings from Last 1 Encounters:   04/28/25 108/52   BP Meds: atenolol Tabs - 25 MG; furosemide Tabs - 20 MG; Irbesartan Tabs - 150 MG     Hyperlipidemia/Lipids:   Lab Results   Component Value Date    LDL 82 04/26/2025    TRIG 151 (H) 04/26/2025   Cholesterol Meds: atorvastatin Tabs - 40 MG; Niacin ER (Antihyperlipidemic) Tbcr - 500 MG   LDL at goal, repeat labs prior to follow-up visit    Hpothyroidisim  Endorses compliance with his LT4 75mcg   States that he has been on this medication for very long time and it was started by his endocrinologist (Dr. Madrigal)  Labs stable, continue current LT4 75 mcg daily  Repeat labs in 6-12 months     Addendum  Patient was sent to the ER today after reviewing his symptoms and vitals with hypotension as well as bradycardia.  Patient overall noted profound weakness and feeling generally unwell.  We reviewed patient should have further evaluation regarding his symptoms and he was in agreement to present to the ER.  Patient taken via wheelchair by clinical staff.    Return in about 3 months (around 7/28/2025).    4/28/2025  Niurka Romano, DO    Note to patient: The 21 Century Cures Act makes medical notes like these available to patients in the interest of transparency. However, be advised this is a  medical document. It is intended as peer to peer communication. It is written in medical language and may contain abbreviations or verbiage that are unfamiliar. It may appear blunt or direct. Medical documents are intended to carry relevant information, facts as evident, and the clinical opinion of the practitioner.

## 2025-04-28 NOTE — HISTORICAL OFFICE NOTE
[facility]  [address], [address]th [place] [address]  [phone]      Ida Burgess  Progress Note  Demographics:  Name: Ida Burgess YOB: 1945  Age: 79, Male Medical Record No: [id number]  Visited Date/Time: 10/29/2024 01:50 PM    Chief Complaints  2 month follow up   echo done    History of Present Illness  Patient is here for follow-up visit.  He just got back from [facility].  He had a good time.  Has no chest pain or shortness of breath.  His edema is gone primarily by wearing stockings.  Cardiac risk factors Diabetes, Hypertension, Dyslipidemia, Obesity and Never smoked    Past Medical History  1.S/P CABG x 4  2.Essential hypertension  3.Hypercholesterolemia  4.CAD (coronary artery disease)  5.Pulmonary HTN  Family History  No significant family history noted.  Social History  Smoking status Never smoked  Tobacco usage - No (Non-smoker (finding))  Review of systems  Constitutional No history of Weight Loss  Eyes No history of Blurry vision  ENT No history of Bloody nose (epistaxis)  Gastrointestinal No history of Abdominal pain  Cardiovascular No history of Chest pain, GRIFFITH, Palpitations, Syncope, PND, Orthopnea, Edema and Claudication  Genitourinary No history of Dysuria  Musculoskeletal No history of Myalgias  Respiratory No history of SOB  Neurological No history of Migraines  Endocrine No history of Polyuria  Hem/Lymphatic No history of Easy bruising  Integumentary No history of Rashes  Physical Examination  Vitals Left Arm Sitting  / 64 mmHg, Pulse rate 52 bpm, Height in 5' 6\", BMI: 32.3, Weight in 200 lbs (or) 90.72 kgs and BSA : 2.09 cc/m²  General Appearance No Acute Distress  Cardiovascular   EKG/Other abnormalities  HEENT: EOMI, mucosa moist  Lungs: clear  CV: RRR  Abd: soft  Ext: Trace edema  Neuro: A&O  Allergies  1.Cycloset - Drug Class(Reaction:anaphalyxis, Severity:Severe)  Medications (Info obtained by: Verbal)  1.Apidra U-100  Insulin 100 unit/mL subcutaneous solution, Take (subcutaneous) once a day.  2.aspirin 81 MG tablet, daily  3.atenoloL 25 mg tablet, Take one-half tablet orally once a day.  4.atorvastatin 40 mg tablet, Take 1 tablet orally once a day.  5.Cyanocobalamin (VITAMIN B-12) 1000 MCG sublingual tablet, Place 1,000 mcg under the tongue.  6.empagliflozin (JARDIANCE) 25 MG tablet, Take 25 mg by mouth daily (before breakfast).  7.Ferrous Sulfate (IRON) 325 (65 Fe) MG Tab, Take 325 mg by mouth daily.  8.furosemide (LASIX) 40 MG tablet, Take 40 mg by mouth daily.  9.insulin glargine (LANTUS SOLOSTAR) 100 UNIT/ML pen-injector, 68-70 units in the AM  10.irbesartan (AVAPRO) 150 MG tablet, Take 150 mg by mouth.  11.isosorbide mononitrate (IMDUR) 30 MG 24 hr tablet, daily.  12.levothyroxine (SYNTHROID, LEVOTHROID) 75 MCG tablet, daily.  13.metFORMIN (GLUCOPHAGE) 500 MG tablet, Take 1,000 mg by mouth 2 times daily (with meals).  14.nicotinic acid (NIACIN) 500 MG tablet, Take 500 mg by mouth daily (with breakfast).  15.potassium chloride (KLOR-CON, K-TAB) 10 MEQ ER tablet, 2 times daily.  16.pregabalin 75 mg capsule, Take 1 capsule orally 2 times a day.  17.tamsulosin (FLOMAX) 0.4 MG Cap, TAKE 2 CAPSULES BY MOUTH DAILY  18.Vitamin D, Ergocalciferol, 1.25 mg (50,000 units) capsule, Take 50,000 Units by mouth 1 day a week.    Impression  1.S/P CABG x 4  2.Essential hypertension  3.Hypercholesterolemia  4.CAD (coronary artery disease)  5.Pulmonary HTN  \  Assessment & Plan  Patient is doing well.  I reviewed his echo with him.  LV function is normal.  His lower extremity edema most likely is venous insufficiency.  It is improved and essentially abated with just compression which have asked him to continue.  He otherwise looks compensated.  His LDL is at goal.  Will have him continue his present regimen.  I will see him back in a year.  He will call us with questions or concerns.    Future appointments  1.Referral Visit - José Briggs  ([email or web address]) : (Today)  2.Follow up visit - Rickey Butts MD (1 Year)  Miscellaneous  1.Reviewed trans thoracic echocardiogram with the patient.  2.Weight monitoring (regime/therapy)  Nurses documentation  Upcoming surgeries/procedures: none  Use of assistive devices: cane  Verbal order for EKG: none  Refills: none  Triage and medication list reviewed by: LBASHLEY   Patient instructions  Follow up with Dr. Butts in one year, we will call you to schedule this  Diagnostics Details  Trans Thoracic Echocardiogram 10/14/2024  1.The study quality is good.    2.The left ventricle is normal in size. Global left ventricular systolic function is normal. The left ventricular ejection fraction is 55-60%. The left ventricle diastolic function is impaired (Grade II) with an elevated left atrial pressure. No regional wall motion abnormality noted.    3.The right ventricle is normal in size. Right ventricular systolic function is normal.    4.The left atrium is mildly enlarged.    5.Mild calcification of the aortic valve is noted.    6.The estimated pulmonary artery systolic pressure is 46 mmHg assuming a right atrial pressure of 8 mmHg.    Care Providers: Rickey Butts MD and Shena COLLINS  Electronically Authenticated by  Rickey Butts MD  10/29/2024 04:37:54 PM  Disclaimer: Components of this note were documented using voice recognition system and are subject to errors not corrected at proofreading. Contact the author of this note for any clarifications.

## 2025-04-29 ENCOUNTER — APPOINTMENT (OUTPATIENT)
Dept: ULTRASOUND IMAGING | Facility: HOSPITAL | Age: 80
End: 2025-04-29
Attending: HOSPITALIST
Payer: MEDICARE

## 2025-04-29 VITALS
SYSTOLIC BLOOD PRESSURE: 112 MMHG | HEART RATE: 51 BPM | RESPIRATION RATE: 20 BRPM | WEIGHT: 187.38 LBS | OXYGEN SATURATION: 97 % | BODY MASS INDEX: 30 KG/M2 | DIASTOLIC BLOOD PRESSURE: 39 MMHG | TEMPERATURE: 98 F

## 2025-04-29 LAB
ATRIAL RATE: 57 BPM
CHOLEST SERPL-MCNC: 104 MG/DL (ref ?–200)
ERYTHROCYTE [DISTWIDTH] IN BLOOD BY AUTOMATED COUNT: 15 %
GLUCOSE BLD-MCNC: 105 MG/DL (ref 70–99)
GLUCOSE BLD-MCNC: 108 MG/DL (ref 70–99)
GLUCOSE BLD-MCNC: 185 MG/DL (ref 70–99)
HCT VFR BLD AUTO: 31.2 % (ref 39–53)
HDLC SERPL-MCNC: 30 MG/DL (ref 40–59)
HGB BLD-MCNC: 9.8 G/DL (ref 13–17.5)
LDLC SERPL CALC-MCNC: 50 MG/DL (ref ?–100)
MCH RBC QN AUTO: 31.9 PG (ref 26–34)
MCHC RBC AUTO-ENTMCNC: 31.4 G/DL (ref 31–37)
MCV RBC AUTO: 101.6 FL (ref 80–100)
NONHDLC SERPL-MCNC: 74 MG/DL (ref ?–130)
P AXIS: -45 DEGREES
P-R INTERVAL: 176 MS
PLATELET # BLD AUTO: 190 10(3)UL (ref 150–450)
Q-T INTERVAL: 422 MS
QRS DURATION: 84 MS
QTC CALCULATION (BEZET): 410 MS
R AXIS: 6 DEGREES
RBC # BLD AUTO: 3.07 X10(6)UL (ref 3.8–5.8)
T AXIS: -66 DEGREES
TRIGL SERPL-MCNC: 138 MG/DL (ref 30–149)
TROPONIN I SERPL HS-MCNC: 324 NG/L (ref ?–53)
VENTRICULAR RATE: 57 BPM
VLDLC SERPL CALC-MCNC: 20 MG/DL (ref 0–30)
WBC # BLD AUTO: 9 X10(3) UL (ref 4–11)

## 2025-04-29 PROCEDURE — 99239 HOSP IP/OBS DSCHRG MGMT >30: CPT | Performed by: HOSPITALIST

## 2025-04-29 PROCEDURE — 93970 EXTREMITY STUDY: CPT | Performed by: HOSPITALIST

## 2025-04-29 RX ORDER — FUROSEMIDE 20 MG/1
20 TABLET ORAL EVERY EVENING
Status: DISCONTINUED | OUTPATIENT
Start: 2025-04-29 | End: 2025-04-29

## 2025-04-29 RX ORDER — ASPIRIN 81 MG/1
81 TABLET, CHEWABLE ORAL DAILY
Status: DISCONTINUED | OUTPATIENT
Start: 2025-04-29 | End: 2025-04-29

## 2025-04-29 RX ORDER — INSULIN GLARGINE 100 [IU]/ML
58 INJECTION, SOLUTION SUBCUTANEOUS NIGHTLY
Status: SHIPPED | COMMUNITY
Start: 2025-04-29

## 2025-04-29 NOTE — PROGRESS NOTES
NURSING DISCHARGE NOTE    Discharged Home via Wheelchair.  Accompanied by Support staff  Belongings Taken by patient/family.    Discharge instructions given to and understood by patient and wife. Appointments reviewed, all questions answered. Patient left unit with all belongings and in no signs or symptoms of distress. IV removed and catheter intact.

## 2025-04-29 NOTE — PROGRESS NOTES
1720: patient informed this RN that he seen optometrist on April 26th and was told he has bleeding in back of right eye. Patient concerned with Lovenox ordered. This RN paged Dr. Cox, telephone orders with ok to hold.

## 2025-04-29 NOTE — PROGRESS NOTES
[facility]   part of [facility]     Hospitalist Progress Note     Ida Burgess Patient Status:  Inpatient    1945 MRN [id number]   Location [facility] 8NE-A Attending Galileo Cox,    Hosp Day # 1 PCP Luis Miguel Gomez MD     Chief Complaint: Hypotension/bradycardia    Subjective:     Patient seen and examined. Feeling well. Did notice some blood mixed with brown stool last night.     Objective:    Review of Systems:   A comprehensive review of systems was completed; pertinent positive and negatives stated in subjective.    Vital signs:  Temp:  [97.9 °F (36.6 °C)-98.6 °F (37 °C)] 98.1 °F (36.7 °C)  Pulse:  [50-62] 51  Resp:  [15-20] 20  BP: (105-137)/(39-61) 112/39  SpO2:  [96 %-100 %] 97 %    Physical Exam:    General: No acute distress  Respiratory: No wheezes, no rhonchi  Cardiovascular: S1, S2, Bradycardic.   Abdomen: Soft, Non-tender, non-distended, positive bowel sounds  Neuro: No new focal deficits.   Extremities: No edema    Diagnostic Data:    Labs:  Recent Labs   Lab 25  1157   WBC 8.8   HGB 10.7*   MCV 98.8   .0       Recent Labs   Lab 25  0958 25  1157   * 149*   BUN 8* 9   CREATSERUM 0.83 1.06   CA 8.8 8.7   ALB  --  3.9    142   K 4.1 4.0    105   CO2 26.0 27.0   ALKPHO  --  73   AST  --  20   ALT  --  20   BILT  --  0.5   TP  --  6.4       Estimated Glomerular Filtration Rate: 71 mL/min/1.73m2 (result from lab).    Recent Labs   Lab 25  1157 25  1751 25  0104   TROPHS 515* 405* 324*       No results for input(s): \"PTP\", \"INR\" in the last 168 hours.               Microbiology    No results found for this visit on 25.      Imaging: Reviewed in Epic.    Medications: Scheduled Medications[1]    Assessment & Plan:      #Hypotension  #Bradycardia  -Possibly due to inadvertent extra dose of BB  -Hold BB/ARB/nitrate/lasix for now  -BP improved s/p IVF  -Cardiology following     #Elevated troponin  #CAD with prior  CABG  -Suspect demand ischemia   -ASA  -Tele  -Echo reviewed  -Cardiology following     #Peripheral edema  -Likely due to non-compliance with diuretics - now restarted  -Venous dopplers negative    #Presumed hemorrhoidal bleeding  -Patient reports history of hemorrhoids and reports upcomming appointment with GI  -Monitor hgb      #DMII  -Tresiba/SSI     #Hypothyroidism  -Synthroid      #BPH  -Flomax      #Obesity  -Body mass index is 30.25 kg/m².    Galileo Cox DO    Supplementary Documentation:     Quality:  DVT Mechanical Prophylaxis:     Early ambuation  DVT Pharmacologic Prophylaxis   Medication    [Held by provider] enoxaparin (Lovenox) 40 MG/0.4ML SUBQ injection 40 mg      DVT Pharmacologic prophylaxis: Aspirin 162 mg         Code Status: Not on file  Zelaya: No urinary catheter in place  Zelaya Duration (in days):   Central line:    ADAN:     Discharge is dependent on: clinical progress  At this point Mr. Burgess is expected to be discharge to: home    The 21st Century Cures Act makes medical notes like these available to patients in the interest of transparency. Please be advised this is a medical document. Medical documents are intended to carry relevant information, facts as evident, and the clinical opinion of the practitioner. The medical note is intended as peer to peer communication and may appear blunt or direct. It is written in medical language and may contain abbreviations or verbiage that are unfamiliar.              **Certification      PHYSICIAN Certification of Need for Inpatient Hospitalization - Initial Certification    Patient will require inpatient services that will reasonably be expected to span two midnight's based on the clinical documentation in H+P.   Based on patients current state of illness, I anticipate that, after discharge, patient will require TBD.           [1]    aspirin  81 mg Oral Daily    furosemide  20 mg Oral QPM    atorvastatin  40 mg Oral Nightly    insulin degludec  55  Units Subcutaneous Nightly    levothyroxine  75 mcg Oral Before breakfast    pregabalin  75 mg Oral BID    tamsulosin  0.8 mg Oral Daily    [Held by provider] enoxaparin  40 mg Subcutaneous Daily    insulin aspart  1-10 Units Subcutaneous TID AC and HS

## 2025-04-29 NOTE — PLAN OF CARE
Patient alert and oriented x 4. Up with cane and standby assist.On room air . Sinus rosanne on tele. Continent of bowel and bladder. No complaints of pain, SOB, and/or chest pain / discomfort. POC discussed with patient, patient's wife, and son. Call light within reach, fall precautions in place.       Problem: Diabetes/Glucose Control  Goal: Glucose maintained within prescribed range  Description: INTERVENTIONS:- Monitor Blood Glucose as ordered- Assess for signs and symptoms of hyperglycemia and hypoglycemia- Administer ordered medications to maintain glucose within target range- Assess barriers to adequate nutritional intake and initiate nutrition consult as needed- Instruct patient on self management of diabetes  Outcome: Progressing     Problem: Patient/Family Goals  Goal: Patient/Family Long Term Goal  Description: Patient's Long Term Goal: Stay out of hospital. Interventions:- Negative workup. Follow up appts.- See additional Care Plan goals for specific interventions  Outcome: Progressing  Goal: Patient/Family Short Term Goal  Description: Patient's Short Term Goal: Weakness. Interventions: - Adjust BP meds to avoid hypotension. - See additional Care Plan goals for specific interventions  Outcome: Progressing     Problem: CARDIOVASCULAR - ADULT  Goal: Maintains optimal cardiac output and hemodynamic stability  Description: INTERVENTIONS:- Monitor vital signs, rhythm, and trends- Monitor for bleeding, hypotension and signs of decreased cardiac output- Evaluate effectiveness of vasoactive medications to optimize hemodynamic stability- Monitor arterial and/or venous puncture sites for bleeding and/or hematoma- Assess quality of pulses, skin color and temperature- Assess for signs of decreased coronary artery perfusion - ex. Angina- Evaluate fluid balance, assess for edema, trend weights  Outcome: Progressing  Goal: Absence of cardiac arrhythmias or at baseline  Description: INTERVENTIONS:- Continuous cardiac  monitoring, monitor vital signs, obtain 12 lead EKG if indicated- Evaluate effectiveness of antiarrhythmic and heart rate control medications as ordered- Initiate emergency measures for life threatening arrhythmias- Monitor electrolytes and administer replacement therapy as ordered  Outcome: Progressing

## 2025-04-29 NOTE — PLAN OF CARE
Pt chief complaint upon admission: hypotension & weakness. Received pt at 1930 during handoff.    A&Ox 4, very [place]. Bilat hearing aids. Strength: WNL. RA, lungs sound clear on auscultation. SB/SA on tele, normotensive. Trops trending down. Denies pain. Declined need for pain medication.    GI/: WNL.  Activity: x1 Walker/cane which is at bedside.  LDA: PIV.    Skin: CDI.  Incision: NA.     All medications given as ordered. Pt resting in bed w/ all safety measures in place and call light within reach. Patient updated on plan of care, all questions answered.    Plan is for BLE US. Lovenox on hold currently d/t bleeding in back of R eye. Defer SCDs until DVT r/o from BLE US results.     Problem: Diabetes/Glucose Control  Goal: Glucose maintained within prescribed range  Description: INTERVENTIONS:- Monitor Blood Glucose as ordered- Assess for signs and symptoms of hyperglycemia and hypoglycemia- Administer ordered medications to maintain glucose within target range- Assess barriers to adequate nutritional intake and initiate nutrition consult as needed- Instruct patient on self management of diabetes  Outcome: Progressing     Problem: Patient/Family Goals  Goal: Patient/Family Long Term Goal  Description: Patient's Long Term Goal: Stay out of hospital. Interventions:- Negative workup. Follow up appts.- See additional Care Plan goals for specific interventions  Outcome: Progressing  Goal: Patient/Family Short Term Goal  Description: Patient's Short Term Goal: Weakness. Interventions: - Adjust BP meds to avoid hypotension. - See additional Care Plan goals for specific interventions  Outcome: Progressing     Problem: CARDIOVASCULAR - ADULT  Goal: Maintains optimal cardiac output and hemodynamic stability  Description: INTERVENTIONS:- Monitor vital signs, rhythm, and trends- Monitor for bleeding, hypotension and signs of decreased cardiac output- Evaluate effectiveness of vasoactive medications to optimize hemodynamic  stability- Monitor arterial and/or venous puncture sites for bleeding and/or hematoma- Assess quality of pulses, skin color and temperature- Assess for signs of decreased coronary artery perfusion - ex. Angina- Evaluate fluid balance, assess for edema, trend weights  Outcome: Progressing  Goal: Absence of cardiac arrhythmias or at baseline  Description: INTERVENTIONS:- Continuous cardiac monitoring, monitor vital signs, obtain 12 lead EKG if indicated- Evaluate effectiveness of antiarrhythmic and heart rate control medications as ordered- Initiate emergency measures for life threatening arrhythmias- Monitor electrolytes and administer replacement therapy as ordered  Outcome: Progressing

## 2025-04-29 NOTE — PROGRESS NOTES
Progress Note  Ida Burgess Patient Status:  Inpatient    1945 MRN [id number]   Location [facility] 8NE-A Attending Galileo Cox DO   Hosp Day # 1 PCP Luis Miguel Gomez MD     Subjective:  No chest pain or SOB    Objective:  /61 (BP Location: Right arm)   Pulse 62   Temp 97.9 °F (36.6 °C) (Oral)   Resp 18   Wt 187 lb 6.3 oz (85 kg)   SpO2 97%   BMI 30.25 kg/m²     Telemetry: SB, HR 48-60      Intake/Output: -265    Intake/Output Summary (Last 24 hours) at 2025 0923  Last data filed at 2025 0800  Gross per 24 hour   Intake 360 ml   Output 625 ml   Net -265 ml       Last 3 Weights   25 1420 187 lb 6.3 oz (85 kg)   25 1055 200 lb (90.7 kg)   24 1459 200 lb (90.7 kg)       Labs:  Recent Labs   Lab 25  0958 25  1157   * 149*   BUN 8* 9   CREATSERUM 0.83 1.06   EGFRCR 88 71   CA 8.8 8.7   ALB  --  3.9    142   K 4.1 4.0    105   CO2 26.0 27.0   ALKPHO  --  73   AST  --  20   ALT  --  20   BILT  --  0.5   TP  --  6.4     Recent Labs   Lab 25  1157   RBC 3.27*   HGB 10.7*   HCT 32.3*   MCV 98.8   MCH 32.7   MCHC 33.1   RDW 15.0   NEPRELIM 6.59   WBC 8.8   .0         Recent Labs   Lab 25  1157 25  1751 25  0104   TROPHS 515* 405* 324*     No results found for: \"PT\", \"INR\"    Diagnostics:   Echo:   Conclusions:     1. Left ventricle: The cavity size was normal. Wall thickness was at the      upper limits of normal. Systolic function was normal. The estimated      ejection fraction was 60-65%, by visual assessment. No diagnostic      evidence for regional wall motion abnormalities. Features are consistent      with a pseudonormal left ventricular filling pattern, with concomitant      abnormal relaxation and increased filling pressure - grade 2 diastolic      dysfunction.   2. Right ventricle: The cavity size was normal. Systolic function was      normal.   3. Pulmonary arteries: Systolic pressure was mildly  increased, in the range      of 35mm Hg to 40mm Hg.   Impressions:  This study is compared with previous dated 05/10/2021:   *     ROS    Physical Exam:    Gen: Alert, oriented x 3, in no apparent distress  Heent: Pupils equal, reactive. Mucous membranes moist.   Cardiac: Regular rate and rhythm, normal S1,S2  Lungs: Clear to auscultation  Abd: Soft, non tender, non distended  Ext: BLE edema  Skin: Warm, dry  Neuro: No focal deficits        Medications:    Scheduled Medications[1]  Medication Infusions[2]        Assessment:  Troponin elevation  515, 405, 324  No anginal symptoms  EKG with non specific changes  Echo with normal LVEF, no WMA  On ASA, statin . BB being held   Rectal bleeding  Anemia - hgb 10.7 yesterday  Sinus bradycardia  Fatigue  CAD, CABG  HTN - 137/61  Home BB held  Was not taking ARB at home  Type II DM   Hyperlipidemia - on statin     Plan:  Stop low dose atenolol due to bradycardia  Continue ASA, statin  Will do outpatient nellie pet and follow up with Dr. Butts.   Await results of BLE US  Ok to resume furosemide    Plan of care discussed with patient, RN.    ALEJANDRA Polk  4/29/2025  9:23 AM    BLE US:   Impression   CONCLUSION:  No sign of DVT bilateral legs.          Patient seen and examined independently.  Note reviewed and labs reviewed.  Agree to the assessment and plan with the following additions/changes.  Entire medical decision making & plan performed by myself.    General: NAD.  HEENT: Normocephalic.  Neck: Supple.  Cardiac: RRR. Normal S1, S2 . No murmur.  Lungs: GAEB.  Extremities: No edema.    A/P:  Elevated Trp. Peak ~500. No CP or evidence of ACS  Tele with sinus rosanne (HR ~40s) which is asymptomatic. Stop atenolol  Plan for outpt lexiPET and follow up with Dr. Butts    LOC L3    Hugo Irwin MD  4/29/2025  Interventional Cardiology  [facility]  =======================================================         [1]    atorvastatin  40 mg Oral  Nightly    insulin degludec  55 Units Subcutaneous Nightly    levothyroxine  75 mcg Oral Before breakfast    pregabalin  75 mg Oral BID    tamsulosin  0.8 mg Oral Daily    [Held by provider] enoxaparin  40 mg Subcutaneous Daily    insulin aspart  1-10 Units Subcutaneous TID AC and HS    aspirin  325 mg Oral Daily   [2]

## 2025-05-01 ENCOUNTER — HOSPITAL ENCOUNTER (OUTPATIENT)
Dept: GENERAL RADIOLOGY | Age: 80
Discharge: HOME | End: 2025-05-01
Attending: FAMILY MEDICINE
Payer: MEDICARE

## 2025-05-01 ENCOUNTER — LAB ENCOUNTER (OUTPATIENT)
Dept: LAB | Age: 80
End: 2025-05-01
Attending: FAMILY MEDICINE
Payer: MEDICARE

## 2025-05-01 ENCOUNTER — OFFICE VISIT (OUTPATIENT)
Dept: FAMILY MEDICINE CLINIC | Facility: CLINIC | Age: 80
End: 2025-05-01
Payer: MEDICARE

## 2025-05-01 VITALS
RESPIRATION RATE: 16 BRPM | OXYGEN SATURATION: 98 % | HEART RATE: 52 BPM | TEMPERATURE: 99 F | SYSTOLIC BLOOD PRESSURE: 100 MMHG | HEIGHT: 66 IN | DIASTOLIC BLOOD PRESSURE: 60 MMHG | BODY MASS INDEX: 30.7 KG/M2 | WEIGHT: 191 LBS

## 2025-05-01 DIAGNOSIS — D64.9 ANEMIA, UNSPECIFIED TYPE: ICD-10-CM

## 2025-05-01 DIAGNOSIS — E11.42 TYPE 2 DIABETES MELLITUS WITH DIABETIC POLYNEUROPATHY, WITH LONG-TERM CURRENT USE OF INSULIN (HCC): ICD-10-CM

## 2025-05-01 DIAGNOSIS — I27.20 PULMONARY HTN (HCC): ICD-10-CM

## 2025-05-01 DIAGNOSIS — I10 ESSENTIAL HYPERTENSION: ICD-10-CM

## 2025-05-01 DIAGNOSIS — R10.84 GENERALIZED ABDOMINAL PAIN: ICD-10-CM

## 2025-05-01 DIAGNOSIS — E78.00 HYPERCHOLESTEROLEMIA: ICD-10-CM

## 2025-05-01 DIAGNOSIS — E66.09 CLASS 1 OBESITY DUE TO EXCESS CALORIES WITHOUT SERIOUS COMORBIDITY WITH BODY MASS INDEX (BMI) OF 33.0 TO 33.9 IN ADULT: ICD-10-CM

## 2025-05-01 DIAGNOSIS — Z00.00 ENCOUNTER FOR ANNUAL HEALTH EXAMINATION: ICD-10-CM

## 2025-05-01 DIAGNOSIS — E66.811 CLASS 1 OBESITY DUE TO EXCESS CALORIES WITHOUT SERIOUS COMORBIDITY WITH BODY MASS INDEX (BMI) OF 33.0 TO 33.9 IN ADULT: ICD-10-CM

## 2025-05-01 DIAGNOSIS — I25.10 CORONARY ARTERY DISEASE INVOLVING NATIVE HEART WITHOUT ANGINA PECTORIS, UNSPECIFIED VESSEL OR LESION TYPE: ICD-10-CM

## 2025-05-01 DIAGNOSIS — M47.22 OSTEOARTHRITIS OF SPINE WITH RADICULOPATHY, CERVICAL REGION: ICD-10-CM

## 2025-05-01 DIAGNOSIS — Z00.00 ENCOUNTER FOR ANNUAL WELLNESS EXAM IN MEDICARE PATIENT: Primary | ICD-10-CM

## 2025-05-01 DIAGNOSIS — Z79.4 TYPE 2 DIABETES MELLITUS WITH DIABETIC POLYNEUROPATHY, WITH LONG-TERM CURRENT USE OF INSULIN (HCC): ICD-10-CM

## 2025-05-01 DIAGNOSIS — I95.9 HYPOTENSION, UNSPECIFIED HYPOTENSION TYPE: ICD-10-CM

## 2025-05-01 PROBLEM — R53.1 WEAKNESS: Status: RESOLVED | Noted: 2025-04-28 | Resolved: 2025-05-01

## 2025-05-01 PROBLEM — R79.89 ELEVATED TROPONIN: Status: RESOLVED | Noted: 2025-04-28 | Resolved: 2025-05-01

## 2025-05-01 PROBLEM — D69.6 THROMBOCYTOPENIA: Chronic | Status: RESOLVED | Noted: 2021-04-29 | Resolved: 2025-05-01

## 2025-05-01 LAB
ALBUMIN SERPL-MCNC: 4.2 G/DL (ref 3.2–4.8)
ALBUMIN/GLOB SERPL: 1.6 {RATIO} (ref 1–2)
ALP LIVER SERPL-CCNC: 85 U/L (ref 45–117)
ALT SERPL-CCNC: 18 U/L (ref 10–49)
ANION GAP SERPL CALC-SCNC: 8 MMOL/L (ref 0–18)
AST SERPL-CCNC: 22 U/L (ref ?–34)
BASOPHILS # BLD AUTO: 0.03 X10(3) UL (ref 0–0.2)
BASOPHILS NFR BLD AUTO: 0.3 %
BILIRUB SERPL-MCNC: 0.5 MG/DL (ref 0.2–1.1)
BUN BLD-MCNC: 10 MG/DL (ref 9–23)
CALCIUM BLD-MCNC: 9.3 MG/DL (ref 8.7–10.6)
CHLORIDE SERPL-SCNC: 103 MMOL/L (ref 98–112)
CO2 SERPL-SCNC: 30 MMOL/L (ref 21–32)
CREAT BLD-MCNC: 0.91 MG/DL (ref 0.7–1.3)
DEPRECATED HBV CORE AB SER IA-ACNC: 30 NG/ML (ref 50–336)
EGFRCR SERPLBLD CKD-EPI 2021: 85 ML/MIN/1.73M2 (ref 60–?)
EOSINOPHIL # BLD AUTO: 0.21 X10(3) UL (ref 0–0.7)
EOSINOPHIL NFR BLD AUTO: 1.8 %
ERYTHROCYTE [DISTWIDTH] IN BLOOD BY AUTOMATED COUNT: 15.4 %
FASTING STATUS PATIENT QL REPORTED: NO
GLOBULIN PLAS-MCNC: 2.6 G/DL (ref 2–3.5)
GLUCOSE BLD-MCNC: 129 MG/DL (ref 70–99)
HCT VFR BLD AUTO: 34.7 % (ref 39–53)
HGB BLD-MCNC: 11 G/DL (ref 13–17.5)
IMM GRANULOCYTES # BLD AUTO: 0.05 X10(3) UL (ref 0–1)
IMM GRANULOCYTES NFR BLD: 0.4 %
IRON SATN MFR SERPL: 35 % (ref 20–50)
IRON SERPL-MCNC: 101 UG/DL (ref 65–175)
LYMPHOCYTES # BLD AUTO: 1.07 X10(3) UL (ref 1–4)
LYMPHOCYTES NFR BLD AUTO: 9 %
MCH RBC QN AUTO: 32.4 PG (ref 26–34)
MCHC RBC AUTO-ENTMCNC: 31.7 G/DL (ref 31–37)
MCV RBC AUTO: 102.1 FL (ref 80–100)
MONOCYTES # BLD AUTO: 0.76 X10(3) UL (ref 0.1–1)
MONOCYTES NFR BLD AUTO: 6.4 %
NEUTROPHILS # BLD AUTO: 9.78 X10 (3) UL (ref 1.5–7.7)
NEUTROPHILS # BLD AUTO: 9.78 X10(3) UL (ref 1.5–7.7)
NEUTROPHILS NFR BLD AUTO: 82.1 %
OSMOLALITY SERPL CALC.SUM OF ELEC: 293 MOSM/KG (ref 275–295)
PLATELET # BLD AUTO: 197 10(3)UL (ref 150–450)
POTASSIUM SERPL-SCNC: 4.4 MMOL/L (ref 3.5–5.1)
PROT SERPL-MCNC: 6.8 G/DL (ref 5.7–8.2)
RBC # BLD AUTO: 3.4 X10(6)UL (ref 3.8–5.8)
SODIUM SERPL-SCNC: 141 MMOL/L (ref 136–145)
TOTAL IRON BINDING CAPACITY: 290 UG/DL (ref 250–425)
TRANSFERRIN SERPL-MCNC: 212 MG/DL (ref 215–365)
WBC # BLD AUTO: 11.9 X10(3) UL (ref 4–11)

## 2025-05-01 PROCEDURE — 1125F AMNT PAIN NOTED PAIN PRSNT: CPT | Performed by: FAMILY MEDICINE

## 2025-05-01 PROCEDURE — 99214 OFFICE O/P EST MOD 30 MIN: CPT | Performed by: FAMILY MEDICINE

## 2025-05-01 PROCEDURE — 83550 IRON BINDING TEST: CPT

## 2025-05-01 PROCEDURE — 36415 COLL VENOUS BLD VENIPUNCTURE: CPT

## 2025-05-01 PROCEDURE — 82728 ASSAY OF FERRITIN: CPT

## 2025-05-01 PROCEDURE — 1159F MED LIST DOCD IN RCRD: CPT | Performed by: FAMILY MEDICINE

## 2025-05-01 PROCEDURE — 83540 ASSAY OF IRON: CPT

## 2025-05-01 PROCEDURE — 96160 PT-FOCUSED HLTH RISK ASSMT: CPT | Performed by: FAMILY MEDICINE

## 2025-05-01 PROCEDURE — 99499 UNLISTED E&M SERVICE: CPT | Performed by: FAMILY MEDICINE

## 2025-05-01 PROCEDURE — 80053 COMPREHEN METABOLIC PANEL: CPT

## 2025-05-01 PROCEDURE — 74018 RADEX ABDOMEN 1 VIEW: CPT | Performed by: FAMILY MEDICINE

## 2025-05-01 PROCEDURE — G0439 PPPS, SUBSEQ VISIT: HCPCS | Performed by: FAMILY MEDICINE

## 2025-05-01 PROCEDURE — 85025 COMPLETE CBC W/AUTO DIFF WBC: CPT

## 2025-05-01 PROCEDURE — 1111F DSCHRG MED/CURRENT MED MERGE: CPT | Performed by: FAMILY MEDICINE

## 2025-05-01 PROCEDURE — 1170F FXNL STATUS ASSESSED: CPT | Performed by: FAMILY MEDICINE

## 2025-05-01 NOTE — PROGRESS NOTES
Subjective:   Ida Burgess is a 79 year old male who presents for a MA AHA (Medicare Advantage Annual Health Assessment) and Subsequent Annual Wellness visit (Pt already had Initial Annual Wellness) and scheduled follow up of multiple significant but stable problems.   History of Present Illness            Mr. Burgess is a very pleasant 79-year-old gentleman with history of diabetes mellitus with diabetic polyneuropathy, hypertension, hyperlipidemia, coronary disease, osteoarthritis, pulmonary hypertension, thrombocytopenia here initially for his Medicare wellness exam.  However he was admitted at [facility] on 4/28/2025.  He was sent from his endocrinologist as blood pressure was noted to be low.  He was feeling weak and tired.  Previously he was in [place] and may have taken extra doses of atenolol but is not certain.  Also while he was there he had bleeding hemorrhoids which he has had in the past.  The hospital troponin was elevated.  Hemoglobin was noted to be at 10.7.  Cardiology was consulted.  He does not have fever, cough, chest pain, shortness of breath, nausea, vomiting.  However he has been having mild generalized abdominal pain.  He will be seeing cardiology and gastroenterology in the next few days.  He does not report any bleeding per rectum at this point.  Irbesartan and atenolol were both held.  He is not taking this meds at this point.  His blood pressure is on the low side.  He continues to feel tired and weak.      I had reviewed past medical and family histories together with allergy and medication lists documented.    History/Other:   Fall Risk Assessment:   He has been screened for Falls and is High Risk. Fall Prevention information provided to patient in After Visit Summary.    Do you feel unsteady when standing or walking?: (Patient-Rptd) Yes  Do you worry about falling?: (Patient-Rptd) Yes  Have you fallen in the past year?: (Patient-Rptd) No     Cognitive Assessment:   He had a  completely normal cognitive assessment - see flowsheet entries     Functional Ability/Status:   Ida Burgess has some abnormal functions as listed below:  He has Meal Preparation difficulties based on screening of functional status.He has difficulties Shopping for Groceries based on screening of functional status. He has Hearing problems based on screening of functional status.He has Walking problems based on screening of functional status.       Depression Screening (PHQ):  PHQ-2 SCORE: 0  , done 4/26/2025   Last [place] Suicide Screening on 4/28/2025 was No Risk.         Advanced Directives:   He does NOT have a Living Will. [Do you have a living will?: No]  He does have a POA but we do NOT have it on file in Epic.    Not discussed      Problem List[1]  Allergies:  He is allergic to bromocriptine, clindamycin, and other.    Current Medications:  Active Meds, Sig Only[2]    Medical History:  He  has a past medical history of Atherosclerosis of coronary artery (1994), Elevated troponin (04/28/2025), Heart disease, High blood pressure, High cholesterol, Hyperthyroidism, Hypothyroidism, Osteoarthritis, Other and unspecified hyperlipidemia, Thrombocytopenia (04/29/2021), Type II or unspecified type diabetes mellitus without mention of complication, not stated as uncontrolled, Unspecified essential hypertension, Vertigo (10/04/2023), and Weakness (04/28/2025).  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   Family History:  His family history includes Arrhythmia in his maternal grandmother; Diabetes in his father and mother; Heart Disease in his father and mother; Hypertension in his paternal grandmother.  Social History:  He  reports that he has never smoked. He has never been exposed to  tobacco smoke. He has never used smokeless tobacco. He reports that he does not drink alcohol and does not use drugs.    Tobacco:  He has never smoked tobacco.    CAGE Alcohol Screen:   CAGE screening score of 0 on 4/26/2025, showing low risk of alcohol abuse.      Patient Care Team:  Luis Miguel Gomez MD as PCP - General (Family Medicine)  Laila Doyle MD as Consulting Physician (NEUROLOGY)  Elder Mcknight MD (NEUROSURGERY)  Niurka Romano DO (ENDOCRINOLOGY)    Review of Systems  Constitutional:  Negative for fever.   HENT:  Negative for sore throat and trouble swallowing.    Respiratory:  Negative for cough and shortness of breath.    Cardiovascular:  Negative for chest pain, palpitations and leg swelling.   Gastrointestinal:  Negative for  constipation, diarrhea, nausea and vomiting.   Neurological:  Negative for dizziness, and headaches.        Objective:   Physical Exam  Vitals reviewed.   Constitutional:       General: He is not in acute distress.  HENT:      Right Ear: Tympanic membrane, ear canal and external ear normal.      Left Ear: Tympanic membrane, ear canal and external ear normal.      Nose: Nose normal. No congestion or rhinorrhea.      Mouth/Throat:      Pharynx: Oropharynx is clear. No oropharyngeal exudate or posterior oropharyngeal erythema.   Eyes:      General: No scleral icterus.        Right eye: No discharge.         Left eye: No discharge.      Conjunctiva/sclera: Conjunctivae normal.   Cardiovascular:      Rate and Rhythm: Normal rate and regular rhythm.      Heart sounds: Normal heart sounds. No murmur heard.  Pulmonary:      Effort: Pulmonary effort is normal. No respiratory distress.      Breath sounds: Normal breath sounds. No wheezing or rales.   Abdominal:      General: Bowel sounds are normal. There is no distension.      Palpations: Abdomen is soft. There is no mass.      Tenderness: There is no guarding or rebound.      Hernia: No hernia is present.      Comments: Mild  generalized tenderness   Musculoskeletal:      Cervical back: Neck supple.      Right lower leg: No edema.      Left lower leg: No edema.   Lymphadenopathy:      Cervical: No cervical adenopathy.   Skin:     General: Skin is warm.   Neurological:      General: No focal deficit present.      Mental Status: He is alert.   Psychiatric:         Mood and Affect: Mood normal.         Behavior: Behavior normal.         Thought Content: Thought content normal.         /60   Pulse 52   Temp 98.7 °F (37.1 °C) (Temporal)   Resp 16   Ht 5' 6\" (1.676 m)   Wt 191 lb (86.6 kg)   SpO2 98%   BMI 30.83 kg/m²  Estimated body mass index is 30.83 kg/m² as calculated from the following:    Height as of this encounter: 5' 6\" (1.676 m).    Weight as of this encounter: 191 lb (86.6 kg).    Medicare Hearing Assessment:   Hearing Screening    Screening Method: Whisper Test  Whisper Test Result: Fail               Assessment & Plan:   Ida Burgess is a 79 year old male who presents for a Medicare Assessment.     1. Encounter for annual wellness exam in Medicare patient (Primary)  2. Type 2 diabetes mellitus with diabetic polyneuropathy, with long-term current use of insulin (HCC)  Overview:  Stable, f/u with diabetic specialist as directed. Check urine microalbumin/creatinine ratio  Orders:  -     Microalb/Creat Ratio, Random Urine; Future; Expected date: 05/01/2025  3. Pulmonary HTN (HCC)  Overview:  stable    4. Hypotension, unspecified hypotension type  Continue to hold blood pressure medications including atenolol and irbesartan  - Will monitor  There is a pretreated  - Unclear if anemia is related to this.  Agree with seeing cardiology and gastroenterology but go to the emergency room if condition worsens.  5. Hypercholesterolemia  Overview:  Controlled. Cont. Statin.    6. Essential hypertension  Overview:  bp controlled, cpm    7. Coronary artery disease involving native heart without angina pectoris, unspecified vessel or  lesion type  Overview:  stable    8. Class 1 obesity due to excess calories without serious comorbidity with body mass index (BMI) of 33.0 to 33.9 in adult  Overview:  4/16/19 BMI of 34    Proper diet and exercise recommended.  9. Osteoarthritis of spine with radiculopathy, cervical region  Overview:  stable  10. Anemia, unspecified type  -     CBC With Differential With Platelet; Future; Expected date: 05/01/2025  -     Comp Metabolic Panel (14); Future; Expected date: 05/01/2025  -     Iron And Tibc; Future; Expected date: 05/01/2025  -     Ferritin; Future; Expected date: 05/01/2025  - Agree with seeing gastroenterology but go to the emergency room if symptom or condition worsens  Continue to hold aspirin  11. Generalized abdominal pain  -     XR ABDOMEN (1 VIEW) (CPT=74018); Future; Expected date: 05/01/2025  Await results of abdominal x-ray.  Will consider PPI but will see what the results would yield  [unfilled]            The patient indicates understanding of these issues and agrees to the plan.    This note was prepared using Dragon Medical voice recognition dictation software. As a result errors may occur. When identified these errors have been corrected. While every attempt is made to correct errors during dictation discrepancies may still exist          Follow up in  1  month(s).  Imaging studies ordered.  Lab work ordered.  Reinforced healthy diet, lifestyle, and exercise.      Return in 4 weeks (on 5/29/2025), or if symptoms worsen or fail to improve, for anemia/30.     Luis Miguel Gomez MD, 5/1/2025     Supplementary Documentation:   General Health:  In the past six months, have you lost more than 10 pounds without trying?: (Patient-Rptd) 2 - No  Has your appetite been poor?: (Patient-Rptd) No  Type of Diet: (Patient-Rptd) Vegetarian  How does the patient maintain a good energy level?: (Patient-Rptd) Other  How would you describe your daily physical activity?: (Patient-Rptd) Light  How would you  describe your current health state?: (Patient-Rptd) Poor  How do you maintain positive mental well-being?: (Patient-Rptd) Visiting Family  On a scale of 0 to 10, with 0 being no pain and 10 being severe pain, what is your pain level?: (Patient-Rptd) 1 - (Mild)  In the past six months, have you experienced urine leakage?: (Patient-Rptd) 1-Yes  At any time do you feel concerned for the safety/well-being of yourself and/or your children, in your home or elsewhere?: (Patient-Rptd) Yes  Have you had any immunizations at another office such as Influenza, Hepatitis B, Tetanus, or Pneumococcal?: (Patient-Rptd) Yes    Health Maintenance   Topic Date Due    COVID-19 Vaccine (8 - 2024-25 season) 09/01/2024    Annual Well Visit  01/01/2025    Diabetes Care: Foot Exam (Annual)  01/01/2025    Diabetes Care: Microalb/Creat Ratio (Annual)  01/01/2025    Influenza Vaccine (Season Ended) 10/01/2025    Diabetes Care A1C  10/28/2025    Diabetes Care Dilated Eye Exam  04/25/2026    Diabetes Care: GFR  04/28/2026    Annual Depression Screening  Completed    Fall Risk Screening (Annual)  Completed    Pneumococcal Vaccine: 50+ Years  Completed    Zoster Vaccines  Completed    Meningococcal B Vaccine  Aged Out            [1]   Patient Active Problem List  Diagnosis    Essential hypertension    Hypercholesterolemia    CAD (coronary artery disease)    Type 2 diabetes mellitus with diabetic polyneuropathy, with long-term current use of insulin (HCC)    Class 1 obesity due to excess calories without serious comorbidity with body mass index (BMI) of 33.0 to 33.9 in adult    Pulmonary HTN (HCC)    Osteoarthritis of spine with radiculopathy, cervical region    Hypotension   [2]   Outpatient Medications Marked as Taking for the 5/1/25 encounter (Office Visit) with Luis Miguel Gomez MD   Medication Sig    insulin glargine (LANTUS SOLOSTAR) 100 UNIT/ML Subcutaneous Solution Pen-injector Inject 58 Units into the skin nightly.    ATORVASTATIN 40 MG Oral  Tab Take 1 tablet (40 mg total) by mouth nightly.    pregabalin (LYRICA) 75 MG Oral Cap Take 1 capsule (75 mg total) by mouth 2 (two) times daily.    FUROSEMIDE 20 MG Oral Tab TAKE ONE TABLET BY MOUTH ONCE DAILY    Insulin Glulisine (APIDRA SOLOSTAR) 100 UNIT/ML Subcutaneous Solution Pen-injector Take 18 u with meals plus scale. Max TDD: 70 units    Glucose Blood (ACCU-CHEK GUIDE TEST) In Vitro Strip Test glucose two times a day    metFORMIN 500 MG Oral Tab Take 2 tablets (1,000 mg total) by mouth 2 (two) times daily with meals.    empagliflozin (JARDIANCE) 25 MG Oral Tab Take 1 tablet (25 mg total) by mouth daily.    ISOSORBIDE MONONITRATE ER 30 MG Oral Tablet 24 Hr Take 1 tablet (30 mg total) by mouth daily.    Niacin ER, Antihyperlipidemic, 500 MG Oral Tab CR TAKE 1 TABLET NIGHTLY    levothyroxine 75 MCG Oral Tab Take 1 tablet (75 mcg total) by mouth before breakfast.    TAMSULOSIN 0.4 MG Oral Cap TAKE 2 CAPSULES DAILY    Ferrous Sulfate 325 (65 FE) MG Oral Tab Take 1 tablet (325 mg total) by mouth daily with breakfast.

## 2025-05-01 NOTE — PATIENT INSTRUCTIONS
Thank you for choosing Luis Miguel Gomez MD at [facility]  To Do: Ida Burgess  1. Please see age appropriate health prevention below     Call [phone] to schedule the appointment.   Please signup for TwtBks, which is electronic access to your record if you have not done so.  All your results will post on there.  [email or web address]   You can NOW use TwtBks to book your appointments with us, or consider using open access scheduling which is through the [place] website [email or web address] and type in Luis Miguel Gomez MD and follow the links for \"Schedule Online Now\"    To schedule Imaging or tests at [place] call Central Scheduling [phone], Go to [facility] A ER Building (For example: CT scans, X rays, Ultrasound, MRI)  Cardiac Testing in ER building Building A second floor Cardiac Testing [phone] (For example: Holter Monitor, Cardiac Stress tests,Event Monitor, or 2D Echocardiograms)  [facility] call [phone] usually in [facility]  Walk in Clinic in [place] at [address] Mon-Fri at 8am-7:30 p.m., and Sat/Sun 9:00a.m.-4:30 p.m.  Also at [address] [place] [address]  Call [phone] for info     Please call our office about any questions regarding your treatment/medicines/tests as a result of today's visit.  For your safety, read the entire package insert of all medicines prescribed to you and be aware of all of the risks of treatment even beyond those discussed today.  All therapies have potential risk of harm or side effects or medication interactions.  It is your duty and for your safety to discuss with the pharmacist and our office with questions, and to notify us and stop treatment if problems arise, but know that our intention is that the benefits outweigh those potential risks and we strive to make you healthier and to improve your quality of life.    Referrals, and Radiology Information:    If your insurance requires a referral to a specialist, please  allow 5 business days to process your referral request.    If Luis Miguel Gomez MD orders a CT or MRI, it may take up to 10 business days to receive approval from your insurance company. Once our office has called informing you that the insurance company approved your testing, please call Central Scheduling at [phone]  Please allow our office 5 business days to contact you regarding any testing results.    Refill policies:   Allow 3 business days for refills; controlled substances may take longer and must be picked up from the office in person.  Narcotic medications can only be filled in 30 day increments and must be refilled at an office visit only.  If your prescription is due for a refill, you may be due for a follow-up appointment.  We cannot refill your maintenance medications at a preventative wellness visit.  To best provide you care, patients receiving maintenance medications need to be seen at least twice a year.

## 2025-05-01 NOTE — DISCHARGE SUMMARY
[facility]     Ida Burgess Patient Status:  Inpatient    1945 MRN [id number]   Location [facility] 8NE-A Attending Nicole Soler, DO   Hosp Day # 1 PCP Luis Miguel Gomez MD     Date of Admission: 2025  Date of Discharge:  2025     Discharge Disposition: Home or Self Care    Discharge Diagnosis:  Hypotension  Bradycardia  Demand ischemia   CAD with prior CABG  Peripheral edema  Presumed hemorrhoidal bleeding  DMII  Hypothyroidism  Obesity     History of Present Illness: Ida Burgess is a 79 year old male with a PMH of CAD with prior CABG, BPH, DMII, and hypothyroidism who presented to ED from endocrinologist office due to hypotension/bradycardia. Patient began feeling a bit unwell after taking medications this morning. Patient unsure but may have inadvertently taken extra atenolol. Upon arrival to endocrinologist office, patient requested wheelchair due to assistance and while there was found to be hypotensive and bradycardic. Patient denies chest pain or palpitations. Denies N/V/D. Patient was having bloody stools over the past week which he attributes to hemorrhoids but last 2 bowel movements have been without blood. Patient also reports leg swelling. Patient was recently in [place] for 4 weeks and returned . Patient reports he was not taking diuretics there due to difficulty findings places to urinate when out and was overall less complaint with usual medications.     Brief Synopsis:   #Hypotension  #Bradycardia  -Presumed due to inadvertent extra dose of BB now stopped along with ARB  -BP improved s/p IVF  -Cardiology following     #Elevated troponin presumed demand ischemia due to above    #CAD with prior CABG  -ASA  -Tele  -Echo reviewed  -Cardiology following     #Peripheral edema  -Likely due to non-compliance with diuretics - now restarted  -Venous dopplers negative     #Presumed hemorrhoidal bleeding  -Patient reports history of hemorrhoids and  reports upcomming appointment with GI  -Monitor hgb      #DMII  -Tresiba/SSI     #Hypothyroidism  -Synthroid      #BPH  -Flomax      #Obesity  -Body mass index is 30.25 kg/m².    #Disposition  -Stable for DC home     Lace+ Score: 61  59-90 High Risk  29-58 Medium Risk  0-28   Low Risk       TCM Follow-Up Recommendation:  LACE > 58: High Risk of readmission after discharge from the hospital.  **Certification    Admission date was 4/28/2025.  Inpatient stay was shorter than expected.  Patient's Elevated troponin was initially serious enough to expect a more lengthy hospitalization but patient improved faster than expected.                 Procedures during hospitalization:   None    Incidental or significant findings and recommendations (brief descriptions):  None    Lab/Test results pending at Discharge:   None    Consultants:  Cardiology    Discharge Medication List:     Discharge Medications        CHANGE how you take these medications        Instructions Prescription details   empagliflozin 25 MG Tabs  Commonly known as: Jardiance  What changed: Another medication with the same name was removed. Continue taking this medication, and follow the directions you see here.      Take 1 tablet (25 mg total) by mouth daily.   Quantity: 90 tablet  Refills: 0            CONTINUE taking these medications        Instructions Prescription details   Accu-Chek Guide Test Strp  Generic drug: Glucose Blood      Test glucose two times a day   Quantity: 100 each  Refills: 3     Apidra SoloStar 100 UNIT/ML Sopn  Generic drug: Insulin Glulisine      Take 18 u with meals plus scale. Max TDD: 70 units   Quantity: 63 mL  Refills: 1     atorvastatin 40 MG Tabs  Commonly known as: Lipitor      Take 1 tablet (40 mg total) by mouth nightly.   Quantity: 90 tablet  Refills: 1     Ferrous Sulfate 325 (65 Fe) MG Tabs      Take 1 tablet (325 mg total) by mouth daily with breakfast.   Refills: 0     furosemide 20 MG Tabs  Commonly known as: Lasix       TAKE ONE TABLET BY MOUTH ONCE DAILY   Quantity: 90 tablet  Refills: 0     isosorbide mononitrate ER 30 MG Tb24  Commonly known as: Imdur      Take 1 tablet (30 mg total) by mouth daily.   Quantity: 90 tablet  Refills: 0     Lantus SoloStar 100 UNIT/ML Sopn  Generic drug: insulin glargine      Inject 58 Units into the skin nightly.   Refills: 0     levothyroxine 75 MCG Tabs  Commonly known as: Synthroid      Take 1 tablet (75 mcg total) by mouth before breakfast.   Quantity: 90 tablet  Refills: 0     metFORMIN 500 MG Tabs  Commonly known as: Glucophage      Take 2 tablets (1,000 mg total) by mouth 2 (two) times daily with meals.   Quantity: 360 tablet  Refills: 1     Niacin ER (Antihyperlipidemic) 500 MG Tbcr  Commonly known as: NIASPAN      TAKE 1 TABLET NIGHTLY   Quantity: 90 tablet  Refills: 0     pregabalin 75 MG Caps  Commonly known as: Lyrica      Take 1 capsule (75 mg total) by mouth 2 (two) times daily.   Quantity: 180 capsule  Refills: 0     tamsulosin 0.4 MG Caps  Commonly known as: Flomax      TAKE 2 CAPSULES DAILY   Quantity: 180 capsule  Refills: 1            STOP taking these medications      Accu-Chek FastClix Lancet Kit        Accu-Chek FastClix Lancets Misc        Accu-Chek Guide w/Device Kit        atenolol 25 MG Tabs  Commonly known as: Tenormin        Insulin Pen Needle 31G X 8 MM Misc        Irbesartan 150 MG Tabs        Klor-Con M10 10 MEQ Tbcr  Generic drug: potassium chloride                 [place]    Follow-up appointment:   Rickey Munoz MD  [address]  [facility] [address]  [phone]    Follow up  our office will call you for a nellie pet stress test and office visit with Dr. Butts.    Luis Miguel Gomez MD  [address] [facility] [address]  [facility] [address] [phone]    Schedule an appointment as soon as possible for a visit in 1 week(s)      Appointments for Next 30 Days 5/1/2025 - 5/31/2025      None         -----------------------------------------------------------------------------------------------  PATIENT DISCHARGE INSTRUCTIONS: See electronic chart    Galileo Cox,     Total time spent on discharge plannin minutes     The  [employer] Cures Act makes medical notes like these available to patients in the interest of transparency. Please be advised this is a medical document. Medical documents are intended to carry relevant information, facts as evident, and the clinical opinion of the practitioner. The medical note is intended as peer to peer communication and may appear blunt or direct. It is written in medical language and may contain abbreviations or verbiage that are unfamiliar.

## 2025-05-02 NOTE — PAYOR COMM NOTE
DISCHARGE REVIEW    Payor: [employer]  Subscriber #:  [id number]  Authorization Number: [id number]    Admit date: 25  Admit time:   2:15 PM  Discharge Date: 2025  4:36 PM     Admitting Physician: Galileo Cox DO  Attending Physician:  Nicole Soler DO  Primary Care Physician: Luis Miguel Gomez MD          Discharge Summary Notes        Discharge Summary signed by Galileo Cox DO at 2025  5:58 PM       Author: Galileo Cox DO Specialty: HOSPITALIST Author Type: Physician    Filed: 2025  5:58 PM Date of Service: 2025  5:53 PM Status: Signed    : Galileo Cox DO (Physician)           [facility]     Ida Burgess Patient Status:  Inpatient    1945 MRN [id number]   [facility] 8NE-A Attending Nicole Soler DO   Hosp Day # 1 PCP Luis Miguel Gomez MD     Date of Admission: 2025  Date of Discharge:  2025     Discharge Disposition: Home or Self Care    Discharge Diagnosis:  Hypotension  Bradycardia  Demand ischemia   CAD with prior CABG  Peripheral edema  Presumed hemorrhoidal bleeding  DMII  Hypothyroidism  Obesity     History of Present Illness: Ida Burgess is a 79 year old male with a PMH of CAD with prior CABG, BPH, DMII, and hypothyroidism who presented to ED from endocrinologist office due to hypotension/bradycardia. Patient began feeling a bit unwell after taking medications this morning. Patient unsure but may have inadvertently taken extra atenolol. Upon arrival to endocrinologist office, patient requested wheelchair due to assistance and while there was found to be hypotensive and bradycardic. Patient denies chest pain or palpitations. Denies N/V/D. Patient was having bloody stools over the past week which he attributes to hemorrhoids but last 2 bowel movements have been without blood. Patient also reports leg swelling. Patient was recently in [place] for 4 weeks and returned . Patient reports  he was not taking diuretics there due to difficulty findings places to urinate when out and was overall less complaint with usual medications.     Brief Synopsis:   #Hypotension  #Bradycardia  -Presumed due to inadvertent extra dose of BB now stopped along with ARB  -BP improved s/p IVF  -Cardiology following     #Elevated troponin presumed demand ischemia due to above    #CAD with prior CABG  -ASA  -Tele  -Echo reviewed  -Cardiology following     #Peripheral edema  -Likely due to non-compliance with diuretics - now restarted  -Venous dopplers negative     #Presumed hemorrhoidal bleeding  -Patient reports history of hemorrhoids and reports upcomming appointment with GI  -Monitor hgb      #DMII  -Tresiba/SSI     #Hypothyroidism  -Synthroid      #BPH  -Flomax      #Obesity  -Body mass index is 30.25 kg/m².    #Disposition  -Stable for DC home     Lace+ Score: 61  59-90 High Risk  29-58 Medium Risk  0-28   Low Risk       TCM Follow-Up Recommendation:  LACE > 58: High Risk of readmission after discharge from the hospital.  **Certification    Admission date was 4/28/2025.  Inpatient stay was shorter than expected.  Patient's Elevated troponin was initially serious enough to expect a more lengthy hospitalization but patient improved faster than expected.                 Procedures during hospitalization:   None    Incidental or significant findings and recommendations (brief descriptions):  None    Lab/Test results pending at Discharge:   None    Consultants:  Cardiology    Discharge Medication List:     Discharge Medications        CHANGE how you take these medications        Instructions Prescription details   empagliflozin 25 MG Tabs  Commonly known as: Jardiance  What changed: Another medication with the same name was removed. Continue taking this medication, and follow the directions you see here.      Take 1 tablet (25 mg total) by mouth daily.   Quantity: 90 tablet  Refills: 0            CONTINUE taking these  medications        Instructions Prescription details   Accu-Chek Guide Test Strp  Generic drug: Glucose Blood      Test glucose two times a day   Quantity: 100 each  Refills: 3     Apidra SoloStar 100 UNIT/ML Sopn  Generic drug: Insulin Glulisine      Take 18 u with meals plus scale. Max TDD: 70 units   Quantity: 63 mL  Refills: 1     atorvastatin 40 MG Tabs  Commonly known as: Lipitor      Take 1 tablet (40 mg total) by mouth nightly.   Quantity: 90 tablet  Refills: 1     Ferrous Sulfate 325 (65 Fe) MG Tabs      Take 1 tablet (325 mg total) by mouth daily with breakfast.   Refills: 0     furosemide 20 MG Tabs  Commonly known as: Lasix      TAKE ONE TABLET BY MOUTH ONCE DAILY   Quantity: 90 tablet  Refills: 0     isosorbide mononitrate ER 30 MG Tb24  Commonly known as: Imdur      Take 1 tablet (30 mg total) by mouth daily.   Quantity: 90 tablet  Refills: 0     Lantus SoloStar 100 UNIT/ML Sopn  Generic drug: insulin glargine      Inject 58 Units into the skin nightly.   Refills: 0     levothyroxine 75 MCG Tabs  Commonly known as: Synthroid      Take 1 tablet (75 mcg total) by mouth before breakfast.   Quantity: 90 tablet  Refills: 0     metFORMIN 500 MG Tabs  Commonly known as: Glucophage      Take 2 tablets (1,000 mg total) by mouth 2 (two) times daily with meals.   Quantity: 360 tablet  Refills: 1     Niacin ER (Antihyperlipidemic) 500 MG Tbcr  Commonly known as: NIASPAN      TAKE 1 TABLET NIGHTLY   Quantity: 90 tablet  Refills: 0     pregabalin 75 MG Caps  Commonly known as: Lyrica      Take 1 capsule (75 mg total) by mouth 2 (two) times daily.   Quantity: 180 capsule  Refills: 0     tamsulosin 0.4 MG Caps  Commonly known as: Flomax      TAKE 2 CAPSULES DAILY   Quantity: 180 capsule  Refills: 1            STOP taking these medications      Accu-Chek FastClix Lancet Kit        Accu-Chek FastClix Lancets Misc        Accu-Chek Guide w/Device Kit        atenolol 25 MG Tabs  Commonly known as: Tenormin        Insulin  Pen Needle 31G X 8 MM Misc        Irbesartan 150 MG Tabs        Klor-Con M10 10 MEQ Tbcr  Generic drug: potassium chloride                 [place]: [place]    Follow-up appointment:   Rickey Munoz MD  [address]    [facility] [address]  [phone]    Follow up  our office will call you for a nellie pet stress test and office visit with Dr. Butts.    Luis Miguel Gomez MD  [address]  [address]  [facility] [address]  [phone]    Schedule an appointment as soon as possible for a visit in 1 week(s)      Appointments for Next 30 Days 2025 - 2025      None        -----------------------------------------------------------------------------------------------  PATIENT DISCHARGE INSTRUCTIONS: See electronic chart    Galileo Cox DO    Total time spent on discharge plannin minutes     The 21st Century Cures Act makes medical notes like these available to patients in the interest of transparency. Please be advised this is a medical document. Medical documents are intended to carry relevant information, facts as evident, and the clinical opinion of the practitioner. The medical note is intended as peer to peer communication and may appear blunt or direct. It is written in medical language and may contain abbreviations or verbiage that are unfamiliar.       Electronically signed by Galileo Cox DO on 2025  5:58 PM         REVIEWER COMMENTS

## 2025-05-02 NOTE — PAYOR COMM NOTE
ADMISSION REVIEW     Payor: [employer]  Subscriber #:  [id number]  Authorization Number: [id number]    Admit date: 4/28/25  Admit time:  2:15 PM       REVIEW DOCUMENTATION:     ED Provider Notes        ED Provider Notes signed by Melissa Yousif MD at 4/28/2025  1:17 PM        Chief Complaint   Patient presents with    Hypotension    Fatigue     Stated Complaint: Hypotension, weakness    Subjective:   HPI    Patient here for generalized weakness.    Recently traveled back from [place].  Woke up this morning initially felt fine.    Noticed, after taking his blood pressure medication that he felt very weak on his feet as if he could fall.  Did not actually fall.    Was noted to be hypotensive when he went to the diabetic clinic and was sent to the ER for further evaluation.  No chest pain no shortness of breath         Objective:     Past Medical History:    Atherosclerosis of coronary artery    Heart disease    Hyperthyroidism    Other and unspecified hyperlipidemia    Type II or unspecified type diabetes mellitus without mention of complication, not stated as uncontrolled    Unspecified essential hypertension    Vertigo              Past Surgical History:   Procedure Laterality Date    Cabg  1996    4x bypass    Cataract  Ten yrs approx    Bilateral    Colonoscopy  Not sure    Hemorrhoidectomy  Not sure    Sinus surgery               ED Triage Vitals   BP 04/28/25 1146 108/46   Pulse 04/28/25 1146 60   Resp 04/28/25 1146 18   Temp 04/28/25 1148 97.4 °F (36.3 °C)   Temp src 04/28/25 1148 Temporal   SpO2 04/28/25 1146 100 %   O2 Device 04/28/25 1146 None (Room air)       Current Vitals:   Vital Signs  BP: 117/49  Pulse: 52  Resp: 16  Temp: 97.4 °F (36.3 °C)  Temp src: Temporal  MAP (mmHg): 68    Oxygen Therapy  SpO2: 100 %  O2 Device: None (Room air)        Physical Exam  Physical Exam   Constitutional: Awake, alert, well appearing  Head: Normocephalic and atraumatic.   Eyes: Conjunctivae are normal. Pupils  are equal, round, and reactive to light.   Neck: Normal range of motion. No JVD  Cardiovascular: Normal rate, regular rhythm  Pulmonary/Chest: Normal effort.  No accessory muscle use.  No cyanosis.  Abdominal: Soft. Not distended.  Neurological: Pt is alert and oriented to person, place, and time. no cranial nerve deficits. Speech fluent      No calf tenderness, 1-2+ lower EXTR edema just past the ankles    Belly benign    Regular rate and rhythm no murmurs    Physical Exam            ED Course     Labs Reviewed   CBC WITH DIFFERENTIAL WITH PLATELET - Abnormal; Notable for the following components:       Result Value    RBC 3.27 (*)     HGB 10.7 (*)     HCT 32.3 (*)     All other components within normal limits   COMP METABOLIC PANEL (14) - Abnormal; Notable for the following components:    Glucose 149 (*)     All other components within normal limits   TROPONIN I HIGH SENSITIVITY - Abnormal; Notable for the following components:    Troponin I (High Sensitivity) 515 (*)     All other components within normal limits   D-DIMER - Normal   RAINBOW DRAW BLUE     EKG    Rate, intervals and axes as noted on EKG Report.  Rate: 57  Rhythm: Sinus Rhythm  Reading: Sinus rhythm subtle ST changes in the inferior and lateral precordial leads.        Blood work remarkable for elevated troponin, dimer negative basic labs okay.          Differential diagnoses considered: Dehydration, disturbance, PE, atypical presentation of life-threatening ACS considered    -Elevated troponin of unclear significance.  Patient has not been having anginal equivalents, echo done out of [facility] late last year was pretty normal.    -Will need admission for close observation and an likely further diagnostic tests    -[facility] APN made aware  Patient will be admitted primarily to the ward hospitalist.  Care has been discussed with the admitting physician.        *Discussion of ongoing management of this patient's care included:  [facility], admitting  physician    Shared decision making was done by: patient, myself.          Medical Decision Making      Disposition and Plan     Clinical Impression:  No diagnosis found.     Disposition:  There is no disposition on file for this visit.  There is no disposition time on file for this visit.       4/28      HOSPITALIST:       History and Physical   Chief Complaint: Hypotension/bradycardia     Subjective:  History of Present Illness:      Ida Burgess is a 79 year old male with a PMH of CAD with prior CABG, BPH, DMII, and hypothyroidism who presented to ED from endocrinologist office due to hypotension/bradycardia. Patient began feeling a bit unwell after taking medications this morning. Patient unsure but may have inadvertently taken extra atenolol. Upon arrival to endocrinologist office, patient requested wheelchair due to assistance and while there was found to be hypotensive and bradycardic. Patient denies chest pain or palpitations. Denies N/V/D. Patient was having bloody stools over the past week which he attributes to hemorrhoids but last 2 bowel movements have been without blood. Patient also reports leg swelling. Patient was recently in [place] for 4 weeks and returned 4/25. Patient reports he was not taking diuretics there due to difficulty findings places to urinate when out and was overall less complaint with usual medications.      Objective:  Physical Exam:    /49   Pulse 52   Temp 97.4 °F (36.3 °C) (Temporal)   Resp 16   SpO2 100%   General: No acute distress, Alert  Respiratory: No rhonchi, no wheezes  Cardiovascular: S1, S2. Bradycardic.   Abdomen: Soft, Non-tender, non-distended, positive bowel sounds  Neuro: No new focal deficits  Extremities: 2+ B/L JACOB.               Recent Labs   Lab 04/28/25  1157   RBC 3.27*   HGB 10.7*   HCT 32.3*   MCV 98.8   MCH 32.7   MCHC 33.1   RDW 15.0   NEPRELIM 6.59   WBC 8.8   .0              Recent Labs   Lab 04/26/25  0958 04/28/25  1157   *  149*   BUN 8* 9   CREATSERUM 0.83 1.06   EGFRCR 88 71   CA 8.8 8.7   ALB  --  3.9    142   K 4.1 4.0    105   CO2 26.0 27.0   ALKPHO  --  73   AST  --  20   ALT  --  20   BILT  --  0.5   TP  --  6.4           Recent Labs   Lab 04/28/25  1157   TROPHS 515*     Assessment & Plan:  #Hypotension  #Bradycardia  -Possibly due to inadvertent extra dose of BB  -Hold BB/ARB/nitrate/lasix for now  -BP improved with IVF - limit due to peripheral edema   -Cardiology consulted     #Elevated troponin  #CAD with prior CABG  -ASA  -Trend enzymes  -Tele  -Echo 10/24 reviewed  -Cardiology consulted     #Peripheral edema  -Likely due to non-compliance with diuretics though given recent long distance travel will check venous dopplers  -Monitor volume status with fluids      #DMII  -Tresiba/SSI     #Hypothyroidism  -Synthroid      #BPH  -Flomax      #Obesity  -Body mass index is 30.25 kg/m².          CARDIOLOGY:       Report of Consultation     Reason for Consultation:  Elevated troponin     History of Present Illness:  Ida Burgess is a a(n) 79 year old male with history of coronary artery bypass grafting surgery, hypertension, hyperlipidemia who presents to the ER with weakness.  Patient traveled recently.  He came home and went to go see his endocrinologist.  While driving there, he felt that he was weak.  He did not have any specific sensation of dizziness.  He had no syncope or presyncope.  He had no palpitations.  No chest pain.     While there, he was felt to be bradycardic and hypotensive.  Blood pressure as best as I can tell was 108.  On arrival here, patient has no complaints.     Only thing he has noted differently of these had increased rectal bleeding from his hemorrhoids.  He had a good time while he was traveling.  He had no illness other than a dental procedure in [place] about 2 weeks ago.     He has had no fever chills or cough.     Routine labs in the emergency department revealed a troponin of 500.  He  has had no recent cardiac symptoms whatsoever.     History:  [Past Medical History]    [Past Medical History]   Atherosclerosis of coronary artery    Heart disease    Hyperthyroidism    Other and unspecified hyperlipidemia    Type II or unspecified type diabetes mellitus without mention of complication, not stated as uncontrolled    Unspecified essential hypertension    Vertigo     [Past Surgical History]    [Past Surgical History]        Procedure Laterality Date    Cabg   1996     4x bypass    Cataract   Ten yrs approx     Bilateral    Colonoscopy   Not sure    Hemorrhoidectomy   Not sure    Sinus surgery           [Family History]    [Family History]        Problem Relation Age of Onset    Diabetes Mother      Diabetes Father      Hypertension Paternal Grandmother           Sisters    Heart Disease Neg      High Blood Pressure Neg        reports that he has never smoked. He has never been exposed to tobacco smoke. He has never used smokeless tobacco. He reports that he does not drink alcohol and does not use drugs.        Physical Exam:  Blood pressure 117/49, pulse 52, temperature 97.4 °F (36.3 °C), temperature source Temporal, resp. rate 16      Telemetry: Sinus  General: Alert and oriented in no apparent distress.  HEENT: EOMI, no scleral icterus, mucosa moist  Neck: Supple, carotids 2+   Cardiac: Regular rate and rhythm   Lungs: Clear   Abdomen: Soft, non-tender.   Extremities: Without clubbing, cyanosis; + edema  Neurologic: Alert and oriented, normal affect.  Skin: Warm and dry.     Assessment/Plan:     CV -patient is here with weakness and incidentally noted to have an elevated troponin.  He denies any cardiac symptomatology.  Has been doing well.  EKG reveals nonspecific changes.  Blood pressures here have not been significantly hypotensive.  He apparently was bradycardic but the lowest documented pulse I see before he came to the emergency department was 60.  Elevation of his troponin is of unclear  etiology.  I do not know if this represents silent ischemia although he looks really well compensated and does not appear ill at all.  At the moment, we will trend out his troponins.  If there is a significant rise in his troponins, he will likely need angiography.  If his troponins are flat, ischemic testing is a consideration.  For now, we will continue with low-dose of atenolol baby aspirin irbesartan and isosorbide.  We will hold his potassium today.  Fatigue -etiology unclear.  Echo is pending  History diabetes mellitus  Dyslipidemia -continue statin  Anemia -hemoglobin today is 10.7.  His baseline is closer to 12.  This will need to be trended.     Rickey Butts MD          CARDIOLOGY:       Lab  04/28/25  1157  04/28/25  1751  04/29/25  0104  TROPHS  515*  405*  324*    Physical Exam:    Gen: Alert, oriented x 3, in no apparent distress  Heent: Pupils equal, reactive. Mucous membranes moist.   Cardiac: Regular rate and rhythm, normal S1,S2  Lungs: Clear to auscultation  Abd: Soft, non tender, non distended  Ext: BLE edema    Assessment:  Troponin elevation  515, 405, 324  No anginal symptoms  EKG with non specific changes  Echo with normal LVEF, no WMA  On ASA, statin . BB being held   Rectal bleeding  Anemia - hgb 10.7 yesterday  Sinus bradycardia  Fatigue  CAD, CABG  HTN - 137/61  Home BB held  Was not taking ARB at home  Type II DM   Hyperlipidemia - on statin      Plan:  Stop low dose atenolol due to bradycardia  Continue ASA, statin  Will do outpatient nellie pet and follow up with Dr. Butts.   Await results of BLE US  Ok to resume furosemide     Plan of care discussed with patient, RN.     ALEJANDRA Polk  4/29/2025  9:23 AM     BLE US:   Impression   CONCLUSION:  No sign of DVT bilateral legs.            Patient seen and examined independently.  Note reviewed and labs reviewed.  Agree to the assessment and plan with the following additions/changes.  Entire medical decision making & plan  performed by myself.     General: NAD.  HEENT: Normocephalic.  Neck: Supple.  Cardiac: RRR. Normal S1, S2 . No murmur.  Lungs: GAEB.  Extremities: No edema.     A/P:  Elevated Trp. Peak ~500. No CP or evidence of ACS  Tele with sinus rosanne (HR ~40s) which is asymptomatic. Stop atenolol          HOSPITALIST:     Chief Complaint: Hypotension/bradycardia     Subjective:  Patient seen and examined. Feeling well. Did notice some blood mixed with brown stool last night.     Vital signs:  Temp:  [97.9 °F (36.6 °C)-98.6 °F (37 °C)] 98.1 °F (36.7 °C)  Pulse:  [50-62] 51  Resp:  [15-20] 20  BP: (105-137)/(39-61) 112/39  SpO2:  [96 %-100 %] 97 %    Assessment & Plan:  #Hypotension  #Bradycardia  -Possibly due to inadvertent extra dose of BB  -Hold BB/ARB/nitrate/lasix for now  -BP improved s/p IVF  -Cardiology following     #Elevated troponin  #CAD with prior CABG  -Suspect demand ischemia   -ASA  -Tele  -Echo reviewed  -Cardiology following     #Peripheral edema  -Likely due to non-compliance with diuretics - now restarted  -Venous dopplers negative     #Presumed hemorrhoidal bleeding  -Patient reports history of hemorrhoids and reports upcomming appointment with GI  -Monitor hgb      #DMII  -Tresiba/SSI     #Hypothyroidism  -Synthroid      #BPH  -Flomax      #Obesity  -Body mass index is 30.25 kg/m².        MEDICATIONS ADMINISTERED IN LAST 1 DAY:  Administration History           insulin aspart (NovoLOG) 100 Units/mL FlexPen 1-10 Units  Dose: 1-10 Units  Freq: 3 times daily before meals and nightly Route: SC  Start: 04/28/25 1600 End: 04/29/25 8756   Admin Instructions:   Correction Insulin - calculate insulin requirement  Give 1 unit of Novolog (aspart) insulin for every 20 points blood glucose is greater than 140 mg/dL  **DO NOT HOLD OR ALTER INSULIN DOSE WITHOUT A PHYSICIAN ORDER**  Give Novolog/aspart insulin subcutaneously within 30 minutes of scheduled finger-stick time  Notify physician for blood glucose >351mg/dL  with time and last dose of correction insulin given.   (1600 JL)-Not Given     2254 CLARA-Given     (0700 CLARA)-Not Given     1420 JV-Given          1836-D/C'd   insulin degludec (Tresiba) 100 units/mL flextouch 55 Units  Dose: 55 Units  Freq: Nightly Route: SC  Start: 04/28/25 2100 End: 04/29/25 1836   Admin Instructions:   This is LONG ACTING insulin.  Continue to give basal insulin (insulin degludec - Tresiba) even if NPO.  DO NOT hold or alter insulin dose without a physician order.   2254 CLARA-Given     1836-D/C'd   levothyroxine (Synthroid) tab 75 mcg  Dose: 75 mcg  Freq: Before breakfast Route: OR  Start: 04/29/25 0700 End: 04/29/25 1836   Admin Instructions:   Give on an empty stomach. Hold tube feedings 1 hour before AND after.   0554 CLARA-Given     1836-D/C'd   pregabalin (Lyrica) cap 75 mg  Dose: 75 mg  Freq: 2 times daily Route: OR  Start: 04/28/25 2100 End: 04/29/25 1836   2254 CLARA-Given     0955 JV-Given     1836-D/C'd   sodium chloride 0.9 % IV bolus 500 mL  Dose: 500 mL  Freq: Once Route: IV  Last Dose: Stopped (04/28/25 1413)  Start: 04/28/25 1228 End: 04/28/25 1413   1239 LA-New Bag     1413 LA-Stopped     tamsulosin (Flomax) cap 0.8 mg  Dose: 0.8 mg  Freq: Daily Route: OR  Start: 04/29/25 0930 End: 04/29/25 1836   0955 JV-Given           Vitals (last day) before discharge       Date/Time Temp Pulse Resp BP SpO2 Weight O2 Device O2 Flow Rate (L/min) [facility]    04/29/25 1124 98.1 °F (36.7 °C) 51 20 112/39 97 % -- None (Room air) --     04/29/25 0748 97.9 °F (36.6 °C) 62 18 137/61 97 % -- None (Room air) --     04/29/25 0502 -- -- -- -- -- -- None (Room air) -- CLARA    04/29/25 0502 98.6 °F (37 °C) 54 16 130/59 98 % -- -- -- PM    04/29/25 0007 -- -- -- -- -- -- None (Room air) -- CLARA    04/29/25 0007 98.4 °F (36.9 °C) 51 16 129/48 96 % -- -- -- PM    04/28/25 2111 -- -- -- -- -- -- None (Room air) -- CLARA    04/28/25 2111 98.2 °F (36.8 °C) 50 18 126/48 99 % -- -- -- PM    04/28/25 1424 -- 58 18 110/43 99 % -- --  -- NS    04/28/25 1422 -- 58 18 117/46 100 % -- -- -- NS    04/28/25 1420 98.4 °F (36.9 °C) 59 16 126/52 99 % 187 lb 6.3 oz (85 kg) None (Room air) 0 L/min NS    04/28/25 1413 -- -- -- -- -- -- None (Room air) -- LA    04/28/25 1400 -- 53 15 117/49 100 % -- -- -- LA    04/28/25 1300 -- 52 16 117/49 100 % -- None (Room air) -- LA    04/28/25 1230 -- 56 15 105/46 100 % -- -- -- LA    04/28/25 1148 97.4 °F (36.3 °C) -- -- -- -- -- -- -- LA    04/28/25 1146 -- 60 18 108/46 100 % -- None (Room air) -- LA           Latest Reference Range & Units 04/28/25 11:57 04/28/25 17:51 04/29/25 01:04   Triglycerides 30 - 149 mg/dL  138    HDL Cholesterol 40 - 59 mg/dL  30 (L)    VLDL 0 - 30 mg/dL  20    NON-HDL CHOLESTEROL <130 mg/dL  74    LDL Cholesterol Calc <100 mg/dL  50    Troponin I (High Sensitivity) <=53 ng/L 515 (HH) 405 (HH) 324 (HH)   A/G Ratio 1.0 - 2.0  1.6     PROTEIN, TOTAL 5.7 - 8.2 g/dL 6.4     (HH): Data is critically high  (L): Data is abnormally low

## 2025-05-14 DIAGNOSIS — E11.42 POLYNEUROPATHY DUE TO TYPE 2 DIABETES MELLITUS (HCC): ICD-10-CM

## 2025-05-14 DIAGNOSIS — Z79.4 TYPE 2 DIABETES MELLITUS WITHOUT COMPLICATION, WITH LONG-TERM CURRENT USE OF INSULIN (HCC): ICD-10-CM

## 2025-05-14 DIAGNOSIS — E11.9 TYPE 2 DIABETES MELLITUS WITHOUT COMPLICATION, WITH LONG-TERM CURRENT USE OF INSULIN (HCC): ICD-10-CM

## 2025-05-15 RX ORDER — PREGABALIN 75 MG/1
75 CAPSULE ORAL 2 TIMES DAILY
Qty: 180 CAPSULE | Refills: 0 | Status: SHIPPED | OUTPATIENT
Start: 2025-05-15

## 2025-05-15 NOTE — TELEPHONE ENCOUNTER
Requesting Pregabalin 75 mg   Last OV: 5/1/25  Last Rx'd 1/24/25    No future appointments.    Non-protocol med:  Rx pended and routed for approval/denial

## 2025-05-16 RX ORDER — EMPAGLIFLOZIN 25 MG/1
25 TABLET, FILM COATED ORAL DAILY
Qty: 90 TABLET | Refills: 1 | Status: SHIPPED | OUTPATIENT
Start: 2025-05-16

## 2025-05-16 NOTE — TELEPHONE ENCOUNTER
Endocrine Refill protocol for oral and injectable diabetic medications    Protocol Criteria:  PASSED  Reason: N/A    If all below requirements are met, send a 90-day supply with 1 refill per provider protocol.    Verify appointment with Endocrinology completed in the last 6 months or scheduled in the next 3 months.  Verify A1C has been completed within the last 6 months and is below 8.5%     Last completed office visit: 4/28/2025 Niurka Romano DO   Next scheduled Follow up: No future appointments.   Last A1c result: Last A1c value was 7% done 4/28/2025.      Detail Level: Detailed Quality 111:Pneumonia Vaccination Status For Older Adults: Pneumococcal Vaccination not Administered or Previously Received, Reason not Otherwise Specified Quality 110: Preventive Care And Screening: Influenza Immunization: Influenza Immunization Ordered or Recommended, but not Administered due to system reason

## 2025-05-21 ENCOUNTER — TELEPHONE (OUTPATIENT)
Facility: CLINIC | Age: 80
End: 2025-05-21

## 2025-05-21 DIAGNOSIS — E78.2 MIXED HYPERLIPIDEMIA: ICD-10-CM

## 2025-05-21 RX ORDER — FUROSEMIDE 20 MG/1
20 TABLET ORAL DAILY
Qty: 90 TABLET | Refills: 0 | Status: SHIPPED | OUTPATIENT
Start: 2025-05-21

## 2025-05-21 RX ORDER — TAMSULOSIN HYDROCHLORIDE 0.4 MG/1
0.4 CAPSULE ORAL NIGHTLY
Qty: 90 CAPSULE | Refills: 0 | Status: SHIPPED | OUTPATIENT
Start: 2025-05-21

## 2025-05-21 RX ORDER — ATORVASTATIN CALCIUM 40 MG/1
40 TABLET, FILM COATED ORAL NIGHTLY
Qty: 90 TABLET | Refills: 1 | Status: SHIPPED | OUTPATIENT
Start: 2025-05-21 | End: 2025-11-17

## 2025-05-21 NOTE — TELEPHONE ENCOUNTER
Endocrine refill protocol for lipid lowering medications    Protocol Criteria:  PASSED Reason: N/A    If all below requirements are met, send a 90-day supply with 1 refill per provider protocol.    Verify appointment with Endocrinology completed in the last 6 months or scheduled in the next 3 months.  Lipid panel must have been completed in the last 12 months   ALT result below 80  LDL result below 130    Last completed office visit:4/28/2025 Niurka Romano DO   Last completed telemed visit: Visit date not found  Next scheduled Follow up: No future appointments.   Last Lipid panel date: Cholesterol: 104, done on 4/28/2025.  HDL Cholesterol: 30, done on 4/28/2025.  TriGlycerides 138, done on 4/28/2025.  LDL Cholesterol: 50, done on 4/28/2025.     Last ALT result: Last ALT was 18 done on 5/1/2025.  Last AST was 22 done on 5/1/2025.

## 2025-05-21 NOTE — TELEPHONE ENCOUNTER
Requesting Furosemide 20mg  Filled: 1/21/25 #90 with 0 refills  Hypertension Medications Protocol [id number]/20/2025 01:14 PM   Protocol Details   CMP or BMP in past 12 months    Last BP reading less than 140/90    In person appointment or virtual visit in the past 12 mos or appointment in next 3 mos    EGFRCR or GFRNAA > 50    Medication is active on med list     Requesting Tamsulosin 0.4mg  Filled: 9/4/24 #180 with 1 refills  Genitourinary Medications [id number]/21/2025 08:59 AM   Protocol Details   Patient does not have pulmonary hypertension on problem list    Medication is active on med list    In person appointment or virtual visit in the past 12 mos or appointment in next 3 mos     LOV: 5/1/25 Physical  RTC: 4 weeks  Last Relevant Labs: 5/1/25    No future appointments.    Is he supposed to still be taking these?

## 2025-05-25 DIAGNOSIS — E03.9 HYPOTHYROIDISM, UNSPECIFIED TYPE: Primary | ICD-10-CM

## 2025-05-27 ENCOUNTER — TELEPHONE (OUTPATIENT)
Dept: FAMILY MEDICINE CLINIC | Facility: CLINIC | Age: 80
End: 2025-05-27

## 2025-05-27 RX ORDER — TAMSULOSIN HYDROCHLORIDE 0.4 MG/1
0.4 CAPSULE ORAL 2 TIMES DAILY
Qty: 180 CAPSULE | Refills: 0 | Status: SHIPPED | OUTPATIENT
Start: 2025-05-27

## 2025-05-27 NOTE — TELEPHONE ENCOUNTER
Patient called and said he has always taken 2 TAMSULOSIN 0.4 MG Oral Cap and when he picked it up from his pharmacy he was given only 1 per day.  He wants to know if this was a mistake or intentional?

## 2025-05-28 RX ORDER — TAMSULOSIN HYDROCHLORIDE 0.4 MG/1
0.4 CAPSULE ORAL 2 TIMES DAILY
Qty: 180 CAPSULE | Refills: 0 | OUTPATIENT
Start: 2025-05-28

## 2025-05-28 RX ORDER — LEVOTHYROXINE SODIUM 75 UG/1
75 TABLET ORAL
Qty: 90 TABLET | Refills: 0 | Status: SHIPPED | OUTPATIENT
Start: 2025-05-28

## 2025-05-28 NOTE — TELEPHONE ENCOUNTER
Endocrine refill protocol for medications for hypothyroidism and hyperthyroidism    Protocol Criteria:  PASSED Reason: N/A    If all below requirements are met, send a 90-day supply with 1 refill per provider protocol.    Verify appointment with Endocrinology completed in the last 12 months or scheduled in the next 6 months.    Normal TSH result in the past 12 months   Review recent telephone encounters and mychart communications with patient to ensure a dose change has not occurred since last office visit that was not updated in the medication history list     Last completed office visit:4/28/2025 Niurka Romano DO   Last completed telemed visit: Visit date not found  Next scheduled Follow up: No future appointments.   Last TSH result:   TSH   Date Value Ref Range Status   04/26/2025 3.669 0.550 - 4.780 uIU/mL Final   12/02/2023 2.74 0.40 - 4.50 mIU/L Final

## 2025-05-28 NOTE — TELEPHONE ENCOUNTER
Duplicate request - Rx sent 5/27/25    tamsulosin 0.4 MG Oral Cap 180 capsule 0 5/27/2025 --    Sig - Route: Take 1 capsule (0.4 mg total) by mouth in the morning and 1 capsule (0.4 mg total) before bedtime. TAKE AT BEDTIME. - Oral    Sent to pharmacy as: Tamsulosin HCl 0.4 MG Oral Capsule (Flomax)    Notes to Pharmacy: per pt request    E-Prescribing Status: Receipt confirmed by pharmacy (5/27/2025 11:58 AM CDT)      Pharmacy    [id number] - [place], [place] - [address] [phone], [phone]

## 2025-05-29 ENCOUNTER — ORDER TRANSCRIPTION (OUTPATIENT)
Dept: ADMINISTRATIVE | Facility: HOSPITAL | Age: 80
End: 2025-05-29

## 2025-05-29 DIAGNOSIS — I25.10 CAD (CORONARY ARTERY DISEASE): ICD-10-CM

## 2025-05-29 DIAGNOSIS — R94.39 ABNORMAL STRESS TEST: Primary | ICD-10-CM

## 2025-05-29 DIAGNOSIS — Z95.1 S/P CABG X 4: ICD-10-CM

## 2025-06-09 ENCOUNTER — HOSPITAL ENCOUNTER (OUTPATIENT)
Dept: CT IMAGING | Facility: HOSPITAL | Age: 80
Discharge: HOME | End: 2025-06-09
Attending: INTERNAL MEDICINE
Payer: MEDICARE

## 2025-06-09 VITALS — SYSTOLIC BLOOD PRESSURE: 132 MMHG | DIASTOLIC BLOOD PRESSURE: 60 MMHG | HEART RATE: 55 BPM | RESPIRATION RATE: 17 BRPM

## 2025-06-09 DIAGNOSIS — I25.10 CAD (CORONARY ARTERY DISEASE): ICD-10-CM

## 2025-06-09 DIAGNOSIS — R94.39 ABNORMAL STRESS TEST: ICD-10-CM

## 2025-06-09 DIAGNOSIS — Z95.1 S/P CABG X 4: ICD-10-CM

## 2025-06-09 LAB
CREAT BLD-MCNC: 1 MG/DL (ref 0.7–1.3)
EGFRCR SERPLBLD CKD-EPI 2021: 76 ML/MIN/1.73M2 (ref 60–?)

## 2025-06-09 PROCEDURE — 75574 CT ANGIO HRT W/3D IMAGE: CPT | Performed by: INTERNAL MEDICINE

## 2025-06-09 PROCEDURE — 82565 ASSAY OF CREATININE: CPT

## 2025-06-09 RX ORDER — NITROGLYCERIN 0.4 MG/1
TABLET SUBLINGUAL
Status: COMPLETED
Start: 2025-06-09 | End: 2025-06-09

## 2025-06-09 RX ORDER — METOPROLOL TARTRATE 50 MG
100 TABLET ORAL ONCE
Status: DISCONTINUED | OUTPATIENT
Start: 2025-06-09 | End: 2025-06-11

## 2025-06-09 RX ORDER — NITROGLYCERIN 0.4 MG/1
0.4 TABLET SUBLINGUAL EVERY 5 MIN PRN
Status: DISCONTINUED | OUTPATIENT
Start: 2025-06-09 | End: 2025-06-11

## 2025-06-09 RX ORDER — METOPROLOL TARTRATE 1 MG/ML
5 INJECTION, SOLUTION INTRAVENOUS SEE ADMIN INSTRUCTIONS
Status: DISCONTINUED | OUTPATIENT
Start: 2025-06-09 | End: 2025-06-11

## 2025-06-09 RX ORDER — DILTIAZEM HYDROCHLORIDE 5 MG/ML
5 INJECTION INTRAVENOUS SEE ADMIN INSTRUCTIONS
Status: DISCONTINUED | OUTPATIENT
Start: 2025-06-09 | End: 2025-06-11

## 2025-06-09 RX ADMIN — NITROGLYCERIN 0.4 MG: 0.4 TABLET SUBLINGUAL at 08:55:00

## 2025-06-09 NOTE — IMAGING NOTE
0845- Pt A+O x4. Procedure explained and questions answered.  Pt denies PDE inhibitors, but took isosorbide mononitrate in the a.m. on 6/8/25.  Dr. Lr called to notify; per Dr. Lr ok to administer sublingual nitroglycerin for gated coronary scan.  Pt ambulatory and assisted to CT table.  O2 at 2L NC.  GFR 76. See flowsheet for VS.    CT tech Norma     Contrast= 90  Saline= 100  Average HR= 55    0902- Pt tolerated scan well.  Denies S/S of contrast reaction.  IV d/c'd at 0902.  Cath intact, bleeding controlled.  Pt ambulatory and escorted to changing room for discharge home.   
Call placed to pt regarding CTA Gated Coronary on 6/9/25 at 0930.   Instructed to arrive at 0845.  May eat a light breakfast but drink plenty of fluids a day before and morning of procedure..   Advised to hold caffeine 12 hrs prior to procedure.   Pt to hold Taking his Isosorbide that morning. He can take it after the procedure. May take his usual meds. Verbalized understanding. Questions answered.  
Patient expressed no known problems or needs

## 2025-06-11 ENCOUNTER — APPOINTMENT (OUTPATIENT)
Dept: GASTROENTEROLOGY | Age: 80
End: 2025-06-11
Attending: INTERNAL MEDICINE

## 2025-06-13 ENCOUNTER — PATIENT MESSAGE (OUTPATIENT)
Dept: FAMILY MEDICINE CLINIC | Facility: CLINIC | Age: 80
End: 2025-06-13

## 2025-06-14 ENCOUNTER — PATIENT MESSAGE (OUTPATIENT)
Dept: FAMILY MEDICINE CLINIC | Facility: CLINIC | Age: 80
End: 2025-06-14

## 2025-06-16 ENCOUNTER — LAB ENCOUNTER (OUTPATIENT)
Dept: LAB | Age: 80
End: 2025-06-16
Attending: INTERNAL MEDICINE
Payer: MEDICARE

## 2025-06-16 DIAGNOSIS — I10 ESSENTIAL HYPERTENSION, MALIGNANT: ICD-10-CM

## 2025-06-16 DIAGNOSIS — I25.10 CORONARY ATHEROSCLEROSIS OF NATIVE CORONARY ARTERY: Primary | ICD-10-CM

## 2025-06-16 DIAGNOSIS — D64.9 ANEMIA, UNSPECIFIED TYPE: ICD-10-CM

## 2025-06-16 DIAGNOSIS — E11.42 TYPE 2 DIABETES MELLITUS WITH DIABETIC POLYNEUROPATHY, WITH LONG-TERM CURRENT USE OF INSULIN (HCC): ICD-10-CM

## 2025-06-16 DIAGNOSIS — Z79.4 TYPE 2 DIABETES MELLITUS WITH DIABETIC POLYNEUROPATHY, WITH LONG-TERM CURRENT USE OF INSULIN (HCC): ICD-10-CM

## 2025-06-16 LAB
BASOPHILS # BLD AUTO: 0.04 X10(3) UL (ref 0–0.2)
BASOPHILS NFR BLD AUTO: 0.5 %
CREAT UR-SCNC: 61.5 MG/DL
EOSINOPHIL # BLD AUTO: 0.27 X10(3) UL (ref 0–0.7)
EOSINOPHIL NFR BLD AUTO: 3.1 %
ERYTHROCYTE [DISTWIDTH] IN BLOOD BY AUTOMATED COUNT: 13.7 %
HCT VFR BLD AUTO: 40 % (ref 39–53)
HGB BLD-MCNC: 12.9 G/DL (ref 13–17.5)
IMM GRANULOCYTES # BLD AUTO: 0.01 X10(3) UL (ref 0–1)
IMM GRANULOCYTES NFR BLD: 0.1 %
LYMPHOCYTES # BLD AUTO: 1.85 X10(3) UL (ref 1–4)
LYMPHOCYTES NFR BLD AUTO: 21.6 %
MCH RBC QN AUTO: 30.9 PG (ref 26–34)
MCHC RBC AUTO-ENTMCNC: 32.3 G/DL (ref 31–37)
MCV RBC AUTO: 95.7 FL (ref 80–100)
MICROALBUMIN UR-MCNC: 7.1 MG/DL
MICROALBUMIN/CREAT 24H UR-RTO: 115.4 UG/MG (ref ?–30)
MONOCYTES # BLD AUTO: 0.75 X10(3) UL (ref 0.1–1)
MONOCYTES NFR BLD AUTO: 8.7 %
NEUTROPHILS # BLD AUTO: 5.66 X10 (3) UL (ref 1.5–7.7)
NEUTROPHILS # BLD AUTO: 5.66 X10(3) UL (ref 1.5–7.7)
NEUTROPHILS NFR BLD AUTO: 66 %
PLATELET # BLD AUTO: 156 10(3)UL (ref 150–450)
RBC # BLD AUTO: 4.18 X10(6)UL (ref 3.8–5.8)
WBC # BLD AUTO: 8.6 X10(3) UL (ref 4–11)

## 2025-06-16 PROCEDURE — 82043 UR ALBUMIN QUANTITATIVE: CPT

## 2025-06-16 PROCEDURE — 82570 ASSAY OF URINE CREATININE: CPT

## 2025-06-16 PROCEDURE — 36415 COLL VENOUS BLD VENIPUNCTURE: CPT

## 2025-06-16 PROCEDURE — 85025 COMPLETE CBC W/AUTO DIFF WBC: CPT

## 2025-06-16 NOTE — TELEPHONE ENCOUNTER
Please see CT cardiac over read from 6/9/2025 that was ordered by Dr. Butts.     Should patient schedule an appointment to be further assessed?

## 2025-06-16 NOTE — TELEPHONE ENCOUNTER
Future Appointments   Date Time Provider Department Center   6/17/2025  9:00 AM Luis Miguel Gomez MD [facility] [address] [facility] [address]

## 2025-06-17 ENCOUNTER — OFFICE VISIT (OUTPATIENT)
Dept: FAMILY MEDICINE CLINIC | Facility: CLINIC | Age: 80
End: 2025-06-17
Payer: MEDICARE

## 2025-06-17 VITALS
OXYGEN SATURATION: 98 % | HEART RATE: 52 BPM | TEMPERATURE: 98 F | SYSTOLIC BLOOD PRESSURE: 134 MMHG | RESPIRATION RATE: 16 BRPM | BODY MASS INDEX: 31.66 KG/M2 | HEIGHT: 66 IN | WEIGHT: 197 LBS | DIASTOLIC BLOOD PRESSURE: 60 MMHG

## 2025-06-17 DIAGNOSIS — R05.9 COUGH, UNSPECIFIED TYPE: Primary | ICD-10-CM

## 2025-06-17 DIAGNOSIS — E55.9 VITAMIN D DEFICIENCY: ICD-10-CM

## 2025-06-17 PROCEDURE — 1159F MED LIST DOCD IN RCRD: CPT | Performed by: FAMILY MEDICINE

## 2025-06-17 PROCEDURE — G2211 COMPLEX E/M VISIT ADD ON: HCPCS | Performed by: FAMILY MEDICINE

## 2025-06-17 PROCEDURE — 99214 OFFICE O/P EST MOD 30 MIN: CPT | Performed by: FAMILY MEDICINE

## 2025-06-17 RX ORDER — IRBESARTAN 75 MG/1
75 TABLET ORAL DAILY
COMMUNITY
Start: 2025-05-28

## 2025-06-17 NOTE — PATIENT INSTRUCTIONS
Thank you for choosing Luis Miguel oGmez MD at [facility]  To Do: Ida Burgess  1. Please see below   Call [phone] to schedule the appointment.   Please signup for [employer], which is electronic access to your record if you have not done so.  All your results will post on there.  [email or web address]   You can NOW use [employer] to book your appointments with us, or consider using open access scheduling which is through the [place] website [email or web address] and type in Luis Miguel Gomez MD and follow the links for \"Schedule Online Now\"    To schedule Imaging or tests at [place] call Central Scheduling [phone], Go to [facility] ER Building (For example: CT scans, X rays, Ultrasound, MRI)  Cardiac Testing in ER building Building A second floor Cardiac Testing [phone] (For example: Holter Monitor, Cardiac Stress tests,Event Monitor, or 2D Echocardiograms)  [facility] call [phone] usually in [facility]  Walk in Clinic in [place] at [address] Mon-Fri at 8am-7:30 p.m., and Sat/Sun 9:00a.m.-4:30 p.m.  Also at [address] [place] [address]  Call [phone] for info     Please call our office about any questions regarding your treatment/medicines/tests as a result of today's visit.  For your safety, read the entire package insert of all medicines prescribed to you and be aware of all of the risks of treatment even beyond those discussed today.  All therapies have potential risk of harm or side effects or medication interactions.  It is your duty and for your safety to discuss with the pharmacist and our office with questions, and to notify us and stop treatment if problems arise, but know that our intention is that the benefits outweigh those potential risks and we strive to make you healthier and to improve your quality of life.    Referrals, and Radiology Information:    If your insurance requires a referral to a specialist, please allow 5 business days to process your  referral request.    If Luis Miguel Gomez MD orders a CT or MRI, it may take up to 10 business days to receive approval from your insurance company. Once our office has called informing you that the insurance company approved your testing, please call Central Scheduling at [phone]  Please allow our office 5 business days to contact you regarding any testing results.    Refill policies:   Allow 3 business days for refills; controlled substances may take longer and must be picked up from the office in person.  Narcotic medications can only be filled in 30 day increments and must be refilled at an office visit only.  If your prescription is due for a refill, you may be due for a follow-up appointment.  We cannot refill your maintenance medications at a preventative wellness visit.  To best provide you care, patients receiving maintenance medications need to be seen at least twice a year.

## 2025-06-17 NOTE — PROGRESS NOTES
Subjective:   Patient ID: Ida Burgess is a 80 year old male.    HPI  Mr. Burgess is a very pleasant 80-year-old gentleman with history of diabetes mellitus with diabetic polyneuropathy, hypertension, hyperlipidemia, coronary disease, osteoarthritis, pulmonary hypertension, thrombocytopenia here today after he had heart scan done and had his noncardiac portion read by radiology and showed changes that may show possible bronchitis.  He does have a cough which is mostly dry and occasionally productive mostly at nighttime and when he wakes up in the morning.  Otherwise no fever no chest pain no shortness of breath no nausea no vomiting no abdominal pain.  He also has history of vitamin D deficiency and is wondering if he should take supplements for it.    I had reviewed past medical and family histories together with allergy and medication lists documented.    History/Other:   Review of Systems  Constitutional:  Negative for fever.   HENT:  Negative for sore throat and trouble swallowing.    Respiratory:  Negative for shortness of breath.    Cardiovascular:  Negative for chest pain, palpitations and leg swelling.   Gastrointestinal:  Negative for  constipation, diarrhea, nausea and vomiting.   Neurological:  Negative for dizziness, and headaches.   Current Medications[1]  Allergies:Allergies[2]    Objective:   Physical Exam  Vitals reviewed.   Constitutional:       General: He is not in acute distress.  HENT:      Right Ear: Tympanic membrane, ear canal and external ear normal.      Left Ear: Tympanic membrane, ear canal and external ear normal.      Nose: Nose normal. No congestion or rhinorrhea.      Mouth/Throat:      Pharynx: Oropharynx is clear. No oropharyngeal exudate or posterior oropharyngeal erythema.   Eyes:      General: No scleral icterus.        Right eye: No discharge.         Left eye: No discharge.      Conjunctiva/sclera: Conjunctivae normal.   Cardiovascular:      Rate and Rhythm: Normal rate and  regular rhythm.      Heart sounds: Normal heart sounds. No murmur heard.  Pulmonary:      Effort: Pulmonary effort is normal. No respiratory distress.      Breath sounds: Normal breath sounds. No wheezing or rales.   Abdominal:      General: Bowel sounds are normal. There is no distension.      Palpations: Abdomen is soft. There is no mass.      Tenderness: There is no guarding or rebound.       Musculoskeletal:      Cervical back: Neck supple.      Right lower leg: No edema.      Left lower leg: No edema.   Lymphadenopathy:      Cervical: No cervical adenopathy.   Skin:     General: Skin is warm.   Neurological:      General: No focal deficit present.      Mental Status: He is alert.   Psychiatric:         Mood and Affect: Mood normal.         Behavior: Behavior normal.         Thought Content: Thought content normal.   Assessment & Plan:   1. Cough, unspecified type   - No evidence of bronchitis  - We will not treat at this point as he does not have any other respiratory symptoms  - Perhaps due to postnasal drip and will monitor   2. Vitamin D deficiency   - May take vitamin D 2000 IUs daily over-the-counter     Follow-up as scheduled or as needed  No orders of the defined types were placed in this encounter.    This note was prepared using [employer] voice recognition dictation software. As a result errors may occur. When identified these errors have been corrected. While every attempt is made to correct errors during dictation discrepancies may still exist          Meds This Visit:  Requested Prescriptions      No prescriptions requested or ordered in this encounter       Imaging & Referrals:  None         [1]   Current Outpatient Medications   Medication Sig Dispense Refill    irbesartan 75 MG Oral Tab Take 1 tablet (75 mg total) by mouth daily.      levothyroxine 75 MCG Oral Tab Take 1 tablet (75 mcg total) by mouth before breakfast. 90 tablet 0    tamsulosin 0.4 MG Oral Cap Take 1 capsule (0.4 mg total)  by mouth in the morning and 1 capsule (0.4 mg total) before bedtime. TAKE AT BEDTIME. 180 capsule 0    FUROSEMIDE 20 MG Oral Tab TAKE ONE TABLET BY MOUTH ONCE DAILY 90 tablet 0    atorvastatin 40 MG Oral Tab Take 1 tablet (40 mg total) by mouth nightly. 90 tablet 1    empagliflozin (JARDIANCE) 25 MG Oral Tab TAKE 1 TABLET DAILY 90 tablet 1    PREGABALIN 75 MG Oral Cap TAKE 1 CAPSULE TWICE DAILY 180 capsule 0    insulin glargine (LANTUS SOLOSTAR) 100 UNIT/ML Subcutaneous Solution Pen-injector Inject 58 Units into the skin nightly.      Insulin Glulisine (APIDRA SOLOSTAR) 100 UNIT/ML Subcutaneous Solution Pen-injector Take 18 u with meals plus scale. Max TDD: 70 units 63 mL 1    Glucose Blood (ACCU-CHEK GUIDE TEST) In Vitro Strip Test glucose two times a day 100 each 3    metFORMIN 500 MG Oral Tab Take 2 tablets (1,000 mg total) by mouth 2 (two) times daily with meals. 360 tablet 1    ISOSORBIDE MONONITRATE ER 30 MG Oral Tablet 24 Hr Take 1 tablet (30 mg total) by mouth daily. 90 tablet 0    Niacin ER, Antihyperlipidemic, 500 MG Oral Tab CR TAKE 1 TABLET NIGHTLY 90 tablet 0    Ferrous Sulfate 325 (65 FE) MG Oral Tab Take 1 tablet (325 mg total) by mouth daily with breakfast.      pantoprazole 40 MG Oral Tab EC Take 1 tablet (40 mg total) by mouth every morning before breakfast. (Patient not taking: Reported on 6/17/2025) 30 tablet 0   [2]   Allergies  Allergen Reactions    Bromocriptine ANAPHYLAXIS and OTHER (SEE COMMENTS)    Clindamycin HIVES    Other OTHER (SEE COMMENTS)     Cycloset

## 2025-06-26 ENCOUNTER — ANESTHESIA EVENT (OUTPATIENT)
Dept: GASTROENTEROLOGY | Age: 80
End: 2025-06-26

## 2025-06-26 ENCOUNTER — HOSPITAL ENCOUNTER (OUTPATIENT)
Dept: GASTROENTEROLOGY | Age: 80
Discharge: HOME | End: 2025-06-26
Attending: INTERNAL MEDICINE

## 2025-06-26 ENCOUNTER — ANESTHESIA (OUTPATIENT)
Dept: GASTROENTEROLOGY | Age: 80
End: 2025-06-26

## 2025-06-26 VITALS
SYSTOLIC BLOOD PRESSURE: 143 MMHG | DIASTOLIC BLOOD PRESSURE: 63 MMHG | RESPIRATION RATE: 18 BRPM | BODY MASS INDEX: 31.57 KG/M2 | TEMPERATURE: 96.8 F | HEART RATE: 50 BPM | HEIGHT: 66 IN | OXYGEN SATURATION: 98 % | WEIGHT: 196.43 LBS

## 2025-06-26 VITALS — HEART RATE: 49 BPM

## 2025-06-26 DIAGNOSIS — K62.5 RECTAL BLEED: ICD-10-CM

## 2025-06-26 DIAGNOSIS — K27.9 PEPTIC ULCER DISEASE: ICD-10-CM

## 2025-06-26 LAB — GLUCOSE BLDC GLUCOMTR-MCNC: 183 MG/DL (ref 70–99)

## 2025-06-26 PROCEDURE — 13000008 HB ANESTHESIA MAC OUTSIDE OR

## 2025-06-26 PROCEDURE — C1889 IMPLANT/INSERT DEVICE, NOC: HCPCS

## 2025-06-26 PROCEDURE — 82962 GLUCOSE BLOOD TEST: CPT

## 2025-06-26 PROCEDURE — 10002801 HB RX 250 W/O HCPCS: Performed by: GENERAL ACUTE CARE HOSPITAL

## 2025-06-26 PROCEDURE — 10006023 HB SUPPLY 272

## 2025-06-26 PROCEDURE — 10002801 HB RX 250 W/O HCPCS: Performed by: INTERNAL MEDICINE

## 2025-06-26 PROCEDURE — 10002807 HB RX 258: Performed by: INTERNAL MEDICINE

## 2025-06-26 PROCEDURE — 13000029 HB GI MAJOR COMPLEX CASE EA ADD MINUTE

## 2025-06-26 PROCEDURE — 13000001 HB PHASE II RECOVERY EA 30 MINUTES

## 2025-06-26 PROCEDURE — 10002800 HB RX 250 W HCPCS: Performed by: GENERAL ACUTE CARE HOSPITAL

## 2025-06-26 PROCEDURE — 10004451 HB PACU RECOVERY 1ST 30 MINUTES

## 2025-06-26 PROCEDURE — 13000028 HB GI MAJOR COMPLEX CASE S/U + 1ST 15 MIN

## 2025-06-26 RX ORDER — SODIUM CHLORIDE 9 MG/ML
INJECTION, SOLUTION INTRAVENOUS CONTINUOUS
Status: DISCONTINUED | OUTPATIENT
Start: 2025-06-26 | End: 2025-06-28 | Stop reason: HOSPADM

## 2025-06-26 RX ORDER — LIDOCAINE HYDROCHLORIDE 20 MG/ML
JELLY TOPICAL PRN
Status: COMPLETED | OUTPATIENT
Start: 2025-06-26 | End: 2025-06-26

## 2025-06-26 RX ORDER — LIDOCAINE HYDROCHLORIDE 20 MG/ML
INJECTION, SOLUTION EPIDURAL; INFILTRATION; INTRACAUDAL; PERINEURAL PRN
Status: DISCONTINUED | OUTPATIENT
Start: 2025-06-26 | End: 2025-06-26

## 2025-06-26 RX ORDER — PROPOFOL 10 MG/ML
INJECTION, EMULSION INTRAVENOUS PRN
Status: DISCONTINUED | OUTPATIENT
Start: 2025-06-26 | End: 2025-06-26

## 2025-06-26 RX ADMIN — SODIUM CHLORIDE: 9 INJECTION, SOLUTION INTRAVENOUS at 11:17

## 2025-06-26 RX ADMIN — LIDOCAINE HYDROCHLORIDE 5 MG: 20 INJECTION, SOLUTION EPIDURAL; INFILTRATION; INTRACAUDAL; PERINEURAL at 12:10

## 2025-06-26 RX ADMIN — PROPOFOL 40 MG: 10 INJECTION, EMULSION INTRAVENOUS at 12:13

## 2025-06-26 RX ADMIN — PROPOFOL 150 MCG/KG/MIN: 10 INJECTION, EMULSION INTRAVENOUS at 12:13

## 2025-06-26 RX ADMIN — LIDOCAINE HYDROCHLORIDE 10 ML: 20 JELLY TOPICAL at 12:37

## 2025-06-26 SDOH — SOCIAL STABILITY: SOCIAL INSECURITY: HOW OFTEN DOES ANYONE, INCLUDING FAMILY AND FRIENDS, INSULT OR TALK DOWN TO YOU?: NEVER

## 2025-06-26 SDOH — SOCIAL STABILITY: SOCIAL INSECURITY: HOW OFTEN DOES ANYONE, INCLUDING FAMILY AND FRIENDS, PHYSICALLY HURT YOU?: NEVER

## 2025-06-26 SDOH — SOCIAL STABILITY: SOCIAL NETWORK
HOW OFTEN DO YOU SEE OR TALK TO PEOPLE THAT YOU CARE ABOUT AND FEEL CLOSE TO? (FOR EXAMPLE: TALKING TO FRIENDS ON THE PHONE, VISITING FRIENDS OR FAMILY, GOING TO CHURCH OR CLUB MEETINGS): 5 OR MORE TIMES A WEEK

## 2025-06-26 SDOH — SOCIAL STABILITY: SOCIAL INSECURITY: HOW OFTEN DOES ANYONE, INCLUDING FAMILY AND FRIENDS, SCREAM OR CURSE AT YOU?: NEVER

## 2025-06-26 SDOH — SOCIAL STABILITY: SOCIAL INSECURITY: HOW OFTEN DOES ANYONE, INCLUDING FAMILY AND FRIENDS, THREATEN YOU WITH HARM?: NEVER

## 2025-06-26 ASSESSMENT — LIFESTYLE VARIABLES
AUDIT-C TOTAL SCORE: 0
HOW OFTEN DO YOU HAVE A DRINK CONTAINING ALCOHOL: NEVER
ALCOHOL_USE_STATUS: NO OR LOW RISK WITH VALIDATED TOOL
HOW MANY STANDARD DRINKS CONTAINING ALCOHOL DO YOU HAVE ON A TYPICAL DAY: 0,1 OR 2
HOW OFTEN DO YOU HAVE 6 OR MORE DRINKS ON ONE OCCASION: NEVER

## 2025-06-26 ASSESSMENT — PAIN SCALES - GENERAL
PAINLEVEL_OUTOF10: 0

## 2025-06-26 ASSESSMENT — ACTIVITIES OF DAILY LIVING (ADL)
ADL_SHORT_OF_BREATH: NO
RECENT_DECLINE_ADL: NO
ADL_BEFORE_ADMISSION: INDEPENDENT
ADL_SCORE: 12

## 2025-06-26 ASSESSMENT — PAIN SCALES - WONG BAKER: WONGBAKER_NUMERICALRESPONSE: 0

## 2025-06-30 DIAGNOSIS — Z79.4 TYPE 2 DIABETES MELLITUS WITHOUT COMPLICATION, WITH LONG-TERM CURRENT USE OF INSULIN (HCC): ICD-10-CM

## 2025-06-30 DIAGNOSIS — E11.9 TYPE 2 DIABETES MELLITUS WITHOUT COMPLICATION, WITH LONG-TERM CURRENT USE OF INSULIN (HCC): ICD-10-CM

## 2025-07-01 RX ORDER — BLOOD SUGAR DIAGNOSTIC
STRIP MISCELLANEOUS
Qty: 200 EACH | Refills: 1 | Status: SHIPPED | OUTPATIENT
Start: 2025-07-01

## 2025-07-01 RX ORDER — NIACIN 500 MG/1
500 TABLET, EXTENDED RELEASE ORAL NIGHTLY
Qty: 90 TABLET | Refills: 0 | Status: SHIPPED | OUTPATIENT
Start: 2025-07-01

## 2025-07-01 NOTE — TELEPHONE ENCOUNTER
Requesting Niacin 500mg  LOV: 6/17/25  RTC: prn  Last Relevant Labs: 4/28/25  Filled: 10/3/24 #90 with 0 refills    No future appointments.    Cholesterol Medication Protocol Lthohp5007/01/2025 10:31 AM   Protocol Details   ALT < 80    ALT resulted within past year    Lipid panel within past 12 months    In person appointment or virtual visit in the past 12 mos or appointment in next 3 mos    Medication is active on med list     Rx sent to pharmacy per protocol

## 2025-07-01 NOTE — TELEPHONE ENCOUNTER
Endocrine Refill protocol for Glucose testing supplies     Protocol Criteria: PASSED Reason: N/A    If below requirement is met, send a 90-day supply with 1 refill per provider protocol.    Verify appointment with Endocrinology completed in the last 6 months or scheduled in the next 3 months.    Last completed office visit:4/28/2025 Niurka Romano DO   Last completed telemed visit: Visit date not found  Next scheduled Follow up: No future appointments.

## 2025-07-02 RX ORDER — NIACIN 500 MG/1
500 TABLET, EXTENDED RELEASE ORAL NIGHTLY
Qty: 90 TABLET | Refills: 0 | Status: SHIPPED | OUTPATIENT
Start: 2025-07-02

## 2025-07-03 LAB
ASR DISCLAIMER: NORMAL
CASE RPRT: NORMAL
CLINICAL INFO: NORMAL
PATH REPORT.FINAL DX SPEC: NORMAL
PATH REPORT.GROSS SPEC: NORMAL

## 2025-07-25 ENCOUNTER — TELEPHONE (OUTPATIENT)
Dept: FAMILY MEDICINE CLINIC | Facility: CLINIC | Age: 80
End: 2025-07-25

## 2025-07-25 DIAGNOSIS — E11.9 TYPE 2 DIABETES MELLITUS WITHOUT COMPLICATION, WITH LONG-TERM CURRENT USE OF INSULIN (HCC): ICD-10-CM

## 2025-07-25 DIAGNOSIS — Z79.4 TYPE 2 DIABETES MELLITUS WITHOUT COMPLICATION, WITH LONG-TERM CURRENT USE OF INSULIN (HCC): ICD-10-CM

## 2025-07-25 RX ORDER — TAMSULOSIN HYDROCHLORIDE 0.4 MG/1
0.4 CAPSULE ORAL 2 TIMES DAILY
Qty: 180 CAPSULE | Refills: 0 | Status: SHIPPED | OUTPATIENT
Start: 2025-07-25

## 2025-07-25 NOTE — TELEPHONE ENCOUNTER
Pharmacy needs to clarify directions on Tamsulosin:    Sig - Route: Take 1 capsule (0.4 mg total) by mouth in the morning and 1 capsule (0.4 mg total) before bedtime. TAKE AT BEDTIME.     Does he take once or twice daily?

## 2025-07-28 NOTE — TELEPHONE ENCOUNTER
Endocrine Refill protocol for metformin    Protocol Criteria:  PASSED  Reason: N/A    If all below requirements are met, send a 90-day supply with 1 refill per provider protocol.     Verify appointment with Endocrinology completed in the last 6 months or scheduled in the next 3 months.  Verify A1C has been completed within the last 6 months and is below 8.5%  Verify last GFR is greater than or equal to 40 in the past 12 months    Last completed office visit:4/28/2025 Niurka Romano DO   Last completed telemed visit: Visit date not found  Next scheduled Follow up:   Future Appointments   Date Time Provider Department Center   8/8/2025 10:00 AM Niurka Romano DO [id number] [place]       Last GFR result:    Lab Results   Component Value Date    EGFRCR 76 06/09/2025     Last A1c result: Last A1C result: 7% done 4/28/2025.    Sent to pharmacy per protocol.

## 2025-08-07 DIAGNOSIS — Z79.4 TYPE 2 DIABETES MELLITUS WITHOUT COMPLICATION, WITH LONG-TERM CURRENT USE OF INSULIN (HCC): ICD-10-CM

## 2025-08-07 DIAGNOSIS — E11.9 TYPE 2 DIABETES MELLITUS WITHOUT COMPLICATION, WITH LONG-TERM CURRENT USE OF INSULIN (HCC): ICD-10-CM

## 2025-08-08 ENCOUNTER — OFFICE VISIT (OUTPATIENT)
Facility: CLINIC | Age: 80
End: 2025-08-08

## 2025-08-08 VITALS
SYSTOLIC BLOOD PRESSURE: 134 MMHG | HEART RATE: 76 BPM | WEIGHT: 197 LBS | HEIGHT: 66 IN | BODY MASS INDEX: 31.66 KG/M2 | DIASTOLIC BLOOD PRESSURE: 62 MMHG | OXYGEN SATURATION: 98 %

## 2025-08-08 DIAGNOSIS — E03.9 HYPOTHYROIDISM, UNSPECIFIED TYPE: ICD-10-CM

## 2025-08-08 DIAGNOSIS — Z79.4 TYPE 2 DIABETES MELLITUS WITH HYPERGLYCEMIA, WITH LONG-TERM CURRENT USE OF INSULIN (HCC): Primary | ICD-10-CM

## 2025-08-08 DIAGNOSIS — E11.65 TYPE 2 DIABETES MELLITUS WITH HYPERGLYCEMIA, WITH LONG-TERM CURRENT USE OF INSULIN (HCC): Primary | ICD-10-CM

## 2025-08-08 LAB — HEMOGLOBIN A1C: 7.7 % (ref 4.3–5.6)

## 2025-08-08 PROCEDURE — 1160F RVW MEDS BY RX/DR IN RCRD: CPT | Performed by: STUDENT IN AN ORGANIZED HEALTH CARE EDUCATION/TRAINING PROGRAM

## 2025-08-08 PROCEDURE — 99214 OFFICE O/P EST MOD 30 MIN: CPT | Performed by: STUDENT IN AN ORGANIZED HEALTH CARE EDUCATION/TRAINING PROGRAM

## 2025-08-08 PROCEDURE — 83036 HEMOGLOBIN GLYCOSYLATED A1C: CPT | Performed by: STUDENT IN AN ORGANIZED HEALTH CARE EDUCATION/TRAINING PROGRAM

## 2025-08-08 PROCEDURE — 1159F MED LIST DOCD IN RCRD: CPT | Performed by: STUDENT IN AN ORGANIZED HEALTH CARE EDUCATION/TRAINING PROGRAM

## 2025-08-08 RX ORDER — INSULIN GLARGINE 100 [IU]/ML
80 INJECTION, SOLUTION SUBCUTANEOUS DAILY
Qty: 120 ML | Refills: 1 | OUTPATIENT
Start: 2025-08-08

## 2025-08-08 RX ORDER — INSULIN GLARGINE 100 [IU]/ML
58 INJECTION, SOLUTION SUBCUTANEOUS NIGHTLY
Qty: 15 ML | Refills: 2 | Status: SHIPPED | OUTPATIENT
Start: 2025-08-08

## 2025-08-11 RX ORDER — LEVOTHYROXINE SODIUM 75 UG/1
75 TABLET ORAL
Qty: 90 TABLET | Refills: 0 | Status: SHIPPED | OUTPATIENT
Start: 2025-08-11

## 2025-08-20 RX ORDER — FUROSEMIDE 20 MG/1
20 TABLET ORAL DAILY
Qty: 90 TABLET | Refills: 0 | Status: SHIPPED | OUTPATIENT
Start: 2025-08-20

## (undated) DIAGNOSIS — M54.50 LOW BACK PAIN, UNSPECIFIED BACK PAIN LATERALITY, UNSPECIFIED CHRONICITY, UNSPECIFIED WHETHER SCIATICA PRESENT: Primary | ICD-10-CM

## (undated) NOTE — LETTER
Patient Name: Tuyet Tesfaye  YOB: 1945          MRN number:  [id number]  Date:  12/11/2018  Referring Physician:  José Miguel Laura          SPINE EVALUATION:    Referring Physician: Dr. Epifanio Payton  Diagnosis: Cervical radiculitis (M54.12)     Date of The patient's signs and symptoms are consistent with a PT diagnosis of low back, neck pain. Currently his back pain >neck pain.   Pain has both a mechanical and structural components, He also has significantly tightness in his hamstring.s  Physical Exam fi Today’s Treatment and Response:  Patient education provided on previous HEP. instructed in open book exercise, x10, 90/90 hamstring stretch babak for dural flossing.     Patient was instructed in and issued a HEP for general stretch, flexibility   Charges: P Patient/Family/Caregiver was advised of these findings, precautions, and treatment options and has agreed to actively participate in planning and for this course of care.     Thank you for your referral. Please co-sign or sign and return this letter via fax

## (undated) NOTE — Clinical Note
Patient having BG's under 100 consistently upon waking, usually in the 70s (past 2 weeks), BG's overall are much lower, patient has adjusted his diet significantly, insulin needs have reduced. Thinking he may need less basal along with the new scale we gave him for meals.   Let me know how you would like to adjust. Thanks

## (undated) NOTE — LETTER
Patient Name: Cruzito Buenrostro  YOB: 1945          MRN number:  [id number]  Date:  10/9/2018  Referring Physician:  Samira Zamarripa          OCCUPATIONAL THERAPY UPPER EXTREMITY EVALUATION    Referring Physician: Dr. Loy Collins  Diagnosis: bilateral L  Volar:WNL (2.83)  Except SF, RF and hypothenar emninace  L: Dorsal   WNL  (2.83)     * Pt reports Bilateral Uln Distribution \"numbness\" increasing as the day progresses  \"tingling\"  Along bilateral Uln distribution FA    EDEMA:  None bilateral hands Thank you for your referral. Please co-sign or sign and return this letter via fax as soon as possible to [phone].  If you have any questions, please contact me at Dept: [phone]    Sincerely,  Electronically signed by therapist: Vicenta Lenz,

## (undated) NOTE — LETTER
21    Patient: Kevin Chavira  : 1945 Visit date: 2021    Dear  Gerhardt Guile, MD    Thank you for referring Kevin Fan to my practice. Please find my assessment and plan below.        Assessment   Skin mass  (primary encounter diagnosis

## (undated) NOTE — LETTER
Date: 9/5/2024    Patient Name: Ida Burgess      To Whom It May Concern:    Mr. Ida Burgess is currently under my medical care for the treatment of Type 2 Diabetes Mellitus. Due to this diagnosis, he will need to travel with the following items as needed:    -Insulin Pen Injectors  -Insulin Vials  -Disposable Pen Needles  -Insulin Syringes/Needles  -Alcohol Swabs  -Sharps Disposal Container  -Diabetic Oral Medication   -Glucometer, Continuous Glucose Meter (Sensor,Transmitter,  Device)   -Glucometer Testing Supplies (Test Strips, Lancets)  -Insulin Pump Supplies     Please allow Mr. Ida Burgess to pass through security and board their flight with all necessary supplies. To note, these supplies should be allowed in both carry on and checked luggage as needed.     If you have any questions, you may contact my office at: [phone].       Sincerely,    Niurka Romano, DO

## (undated) NOTE — LETTER
21    Patient: Mirza Young  : 1945 Visit date: 2021    Dear  Radha Romero MD    Thank you for referring Mirza Young to my practice. Please find my assessment and plan below.     Assessment   Skin mass  (primary encounter diagnosis)

## (undated) NOTE — LETTER
3/10/2025                      Notice of Medical Device  [facility]  [address]  [address]   [place] [address]    To whom it may concern:     The following patient, Ida Burgess ,  lives with Diabetes and will be carrying supplies necessary for their care and health management with them during travel.  This may include injectable medications, safety capped needles, vials of insulin, insulin pump supplies and a continuous glucose monitor and supplies.    Please allow these necessary medications and supplies to be taken onto the aircraft with the patient as it is important for them to be able to access them during travel.    Sincerely,  Niurka Romano DO  NPI: [id number]    Office Phone: [phone]

## (undated) NOTE — Clinical Note
Sent apidra over, will need a PA and likely also an appeal, Novolog is not working adequately, let me know if you want me to help with / complete this PA

## (undated) NOTE — LETTER
Patient Name: Too Jimenez  YOB: 1945          MRN number:  [id number]  Date:  11/27/2018  Referring Physician:  Zakia Marcelino

## (undated) NOTE — Clinical Note
Adjusted his lantus slightly 45-48 u due to elevated FBG, I'm having him take some after meal Bgs as well to see how the Apidra is going, he stated he thinks he's having lows, but hasn't been checking regularly after meals.  Let me know if you'd like anything else for him